# Patient Record
Sex: FEMALE | Race: WHITE | Employment: PART TIME | URBAN - METROPOLITAN AREA
[De-identification: names, ages, dates, MRNs, and addresses within clinical notes are randomized per-mention and may not be internally consistent; named-entity substitution may affect disease eponyms.]

---

## 2017-03-14 ENCOUNTER — OFFICE VISIT (OUTPATIENT)
Dept: ORTHOPEDIC SURGERY | Age: 52
End: 2017-03-14
Payer: COMMERCIAL

## 2017-03-14 VITALS — BODY MASS INDEX: 39.15 KG/M2 | WEIGHT: 235 LBS | HEIGHT: 65 IN

## 2017-03-14 DIAGNOSIS — M54.50 CHRONIC MIDLINE LOW BACK PAIN WITHOUT SCIATICA: Primary | ICD-10-CM

## 2017-03-14 DIAGNOSIS — M51.36 DDD (DEGENERATIVE DISC DISEASE), LUMBAR: ICD-10-CM

## 2017-03-14 DIAGNOSIS — G89.29 CHRONIC MIDLINE LOW BACK PAIN WITHOUT SCIATICA: Primary | ICD-10-CM

## 2017-03-14 PROBLEM — M51.369 DDD (DEGENERATIVE DISC DISEASE), LUMBAR: Status: ACTIVE | Noted: 2017-03-14

## 2017-03-14 PROCEDURE — 99203 OFFICE O/P NEW LOW 30 MIN: CPT | Performed by: ORTHOPAEDIC SURGERY

## 2017-03-14 ASSESSMENT — ENCOUNTER SYMPTOMS: BACK PAIN: 1

## 2017-03-15 RX ORDER — GUAIFENESIN AND PSEUDOEPHEDRINE HCL 1200; 120 MG/1; MG/1
1200 TABLET, EXTENDED RELEASE ORAL 2 TIMES DAILY
COMMUNITY
End: 2019-08-01

## 2017-03-15 RX ORDER — CETIRIZINE HYDROCHLORIDE 10 MG/1
10 TABLET ORAL DAILY
COMMUNITY
End: 2017-12-01 | Stop reason: ALTCHOICE

## 2017-03-15 RX ORDER — ESTRADIOL 0.1 MG/G
2 CREAM VAGINAL
COMMUNITY
End: 2019-08-01

## 2017-03-15 RX ORDER — RIZATRIPTAN BENZOATE 10 MG/1
10 TABLET ORAL
COMMUNITY
End: 2019-08-01

## 2017-03-15 RX ORDER — TOLTERODINE 4 MG/1
4 CAPSULE, EXTENDED RELEASE ORAL DAILY
COMMUNITY
End: 2019-08-01

## 2017-03-15 RX ORDER — BUDESONIDE AND FORMOTEROL FUMARATE DIHYDRATE 160; 4.5 UG/1; UG/1
2 AEROSOL RESPIRATORY (INHALATION) 2 TIMES DAILY
COMMUNITY
End: 2019-10-14

## 2017-03-15 RX ORDER — ALBUTEROL SULFATE 90 UG/1
2 AEROSOL, METERED RESPIRATORY (INHALATION) EVERY 6 HOURS PRN
COMMUNITY

## 2017-03-15 RX ORDER — MONTELUKAST SODIUM 10 MG/1
10 TABLET ORAL NIGHTLY
COMMUNITY
End: 2017-12-01 | Stop reason: ALTCHOICE

## 2017-03-21 ENCOUNTER — OFFICE VISIT (OUTPATIENT)
Dept: ORTHOPEDIC SURGERY | Age: 52
End: 2017-03-21
Payer: COMMERCIAL

## 2017-03-21 DIAGNOSIS — M51.36 DDD (DEGENERATIVE DISC DISEASE), LUMBAR: Primary | ICD-10-CM

## 2017-03-21 DIAGNOSIS — G89.29 CHRONIC MIDLINE LOW BACK PAIN WITHOUT SCIATICA: ICD-10-CM

## 2017-03-21 DIAGNOSIS — M54.50 CHRONIC MIDLINE LOW BACK PAIN WITHOUT SCIATICA: ICD-10-CM

## 2017-03-21 PROCEDURE — 99213 OFFICE O/P EST LOW 20 MIN: CPT | Performed by: ORTHOPAEDIC SURGERY

## 2017-03-23 ENCOUNTER — TELEPHONE (OUTPATIENT)
Dept: ORTHOPEDIC SURGERY | Age: 52
End: 2017-03-23

## 2017-03-29 ENCOUNTER — TELEPHONE (OUTPATIENT)
Dept: ORTHOPEDIC SURGERY | Age: 52
End: 2017-03-29

## 2017-12-01 ENCOUNTER — INITIAL CONSULT (OUTPATIENT)
Dept: ONCOLOGY | Age: 52
End: 2017-12-01
Payer: COMMERCIAL

## 2017-12-01 VITALS
DIASTOLIC BLOOD PRESSURE: 82 MMHG | SYSTOLIC BLOOD PRESSURE: 124 MMHG | TEMPERATURE: 97.8 F | BODY MASS INDEX: 43.16 KG/M2 | HEART RATE: 74 BPM | WEIGHT: 252.8 LBS | RESPIRATION RATE: 16 BRPM | HEIGHT: 64 IN

## 2017-12-01 DIAGNOSIS — D69.1 THROMBOCYTOPATHIA (HCC): Primary | ICD-10-CM

## 2017-12-01 PROCEDURE — 1036F TOBACCO NON-USER: CPT | Performed by: INTERNAL MEDICINE

## 2017-12-01 PROCEDURE — G8417 CALC BMI ABV UP PARAM F/U: HCPCS | Performed by: INTERNAL MEDICINE

## 2017-12-01 PROCEDURE — 3014F SCREEN MAMMO DOC REV: CPT | Performed by: INTERNAL MEDICINE

## 2017-12-01 PROCEDURE — G8484 FLU IMMUNIZE NO ADMIN: HCPCS | Performed by: INTERNAL MEDICINE

## 2017-12-01 PROCEDURE — 3017F COLORECTAL CA SCREEN DOC REV: CPT | Performed by: INTERNAL MEDICINE

## 2017-12-01 PROCEDURE — G8427 DOCREV CUR MEDS BY ELIG CLIN: HCPCS | Performed by: INTERNAL MEDICINE

## 2017-12-01 PROCEDURE — 99201 HC NEW PT, E/M LEVEL 1: CPT

## 2017-12-01 PROCEDURE — 99203 OFFICE O/P NEW LOW 30 MIN: CPT | Performed by: INTERNAL MEDICINE

## 2017-12-01 RX ORDER — ROPINIROLE 0.5 MG/1
0.5 TABLET, FILM COATED ORAL 2 TIMES DAILY
COMMUNITY
Start: 2017-10-26

## 2017-12-01 RX ORDER — METHOCARBAMOL 500 MG/1
500 TABLET, FILM COATED ORAL EVERY 6 HOURS PRN
COMMUNITY
End: 2019-08-01

## 2017-12-01 NOTE — PROGRESS NOTES
CONSULT  NOTE    PCP: Ron Soto DO  Referring Provider: No ref. provider found    IMPRESSION:   1. Reactive thrombocytopenia  2. Common variable immunodeficiency receiving replacement IV gammaglobulin  3. Recurrent urinary tract infection  4. Nonalcoholic steatohepatitis biopsy done at [place] [address] 2-3 years ago  5. Obstructive sleep apnea on home CPAP  6. Obesity  7. Back pain/DJD with spinal stimulator  8. Hypothyroidism  9. Mild asthma      INTERVAL HISTORY:   Recent hospital stay in the [facility] with mild thrombocytopenia just above 100,000. No bleeding or bruising with institution of antibiotic therapy platelet count returned to normal.    In addition she has a diagnosis of nonalcoholic steatohepatitis and biopsy at [place] [address] 2-3 years ago. I do not have report of that biopsy. This condition can also lead to cytopenias however most of her counts have been relatively normal.    She is multiple bony issues with bilateral knee surgery bilateral carpal tunnel bilateral shoulder surgery well as the C5-C6 fusion she also has a spinal cord stimulator. She denies hypertension diabetes heart disease strokes kidney problems. She is a nonsmoker has never smoked she uses alcohol rarely. 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 She has allergies to penicillin Ativan and sulfa drugs as well as clindamycin. She also does not use NSAIDS because of nausea and stomach irritation. Review of laboratory values in the prone letter, electronic health record shows that her count is up and down for the most part it is normal.  The remainder of her CBC including white cell differential hemoglobin are all satisfactory. The likelihood that is that this is a reactive thrombocytopenia and the most recent case due to her urinary tract infection. PLAN:     1.  Since her platelet count recovers I don't think any hematologic workup right now is required  2. Remainder of her CBC is also satisfactory  3. No need for bone marrow exam  4. We will be available but will not schedule any follow-up visits at this point  5. Discussed with patient and answered her questions    Return if symptoms worsen or fail to improve. VISIT DIAGNOSIS:  [facility]     PAST MEDICAL HISTORY:      Diagnosis Date    Acid reflux     Anxiety disorder     Arthritis     Bronchitis     With sinus infections    Depression     Hypothyroidism     Migraine headache     Sleep apnea        PAST SURGICAL HISTORY:      Procedure Laterality Date    APPENDECTOMY  11/99    CARPAL TUNNEL RELEASE Right 12/96    Again with trigger finger 12/98    CHOLECYSTECTOMY  6/95    JOINT REPLACEMENT Right 10/09    Knee    JOINT REPLACEMENT Left 3/13    Knee    KNEE ARTHROSCOPY Right     LIVER BIOPSY  11/13/2015    NECK SURGERY      Repair herniated C5-6    NOSE SURGERY  8/2000    Septoplasty, sinuosotomies, hypertrophy turbinates    SHOULDER SURGERY      Manipulation and arthroscopy 12/10    SPINE SURGERY      TENDON RELEASE Right     thumb    TOTAL KNEE ARTHROPLASTY Right     partial        CURRENT MEDICATIONS:   Current Outpatient Prescriptions   Medication Sig Dispense Refill    rOPINIRole (REQUIP) 0.5 MG tablet Take 0.5 mg by mouth      methocarbamol (ROBAXIN) 500 MG tablet Take 500 mg by mouth every 6 hours as needed      tolterodine (DETROL LA) 4 MG extended release capsule Take 4 mg by mouth daily      budesonide-formoterol (SYMBICORT) 160-4.5 MCG/ACT AERO Inhale 2 puffs into the lungs 2 times daily      albuterol sulfate HFA (PROAIR HFA) 108 (90 BASE) MCG/ACT inhaler Inhale 2 puffs into the lungs every 6 hours as needed for Wheezing      Pseudoephedrine-guaiFENesin (MUCINEX D) 120-1200 MG TB12 Take 1,200 mg by mouth 2 times daily      estradiol (ESTRACE) 0.1 MG/GM vaginal cream Place 2 g vaginally Twice a Week      Immune Globulin,

## 2018-03-27 ENCOUNTER — OFFICE VISIT (OUTPATIENT)
Dept: PODIATRY | Age: 53
End: 2018-03-27
Payer: COMMERCIAL

## 2018-03-27 VITALS — HEIGHT: 65 IN | BODY MASS INDEX: 39.15 KG/M2 | WEIGHT: 235 LBS

## 2018-03-27 DIAGNOSIS — L60.0 INGROWN NAIL OF GREAT TOE OF LEFT FOOT: ICD-10-CM

## 2018-03-27 DIAGNOSIS — M79.605 PAIN OF LEFT LOWER EXTREMITY: ICD-10-CM

## 2018-03-27 DIAGNOSIS — R26.2 DIFFICULTY WALKING: ICD-10-CM

## 2018-03-27 DIAGNOSIS — M20.5X2 HALLUX LIMITUS, LEFT: Primary | ICD-10-CM

## 2018-03-27 DIAGNOSIS — R60.0 EDEMA OF LOWER EXTREMITY: ICD-10-CM

## 2018-03-27 PROBLEM — E03.9 HYPOTHYROIDISM: Status: ACTIVE | Noted: 2017-01-27

## 2018-03-27 PROBLEM — M17.9 OSTEOARTHRITIS OF KNEE: Status: ACTIVE | Noted: 2017-01-27

## 2018-03-27 PROBLEM — M17.11 PRIMARY OSTEOARTHRITIS OF RIGHT KNEE: Status: ACTIVE | Noted: 2017-07-19

## 2018-03-27 PROBLEM — Z98.890 STATUS POST ARTHROSCOPY OF SHOULDER: Status: ACTIVE | Noted: 2018-01-02

## 2018-03-27 PROBLEM — T84.84XA PAIN DUE TO TOTAL RIGHT KNEE REPLACEMENT (HCC): Status: ACTIVE | Noted: 2017-04-13

## 2018-03-27 PROBLEM — E66.01 MORBID OBESITY WITH BMI OF 40.0-44.9, ADULT (HCC): Status: ACTIVE | Noted: 2017-12-04

## 2018-03-27 PROBLEM — Z96.652 HISTORY OF KNEE REPLACEMENT PROCEDURE OF LEFT KNEE: Status: ACTIVE | Noted: 2017-01-10

## 2018-03-27 PROBLEM — T84.84XA PAIN DUE TO KNEE JOINT PROSTHESIS (HCC): Status: ACTIVE | Noted: 2017-04-13

## 2018-03-27 PROBLEM — Z96.659 PAIN DUE TO TOTAL KNEE REPLACEMENT (HCC): Status: ACTIVE | Noted: 2017-05-16

## 2018-03-27 PROBLEM — Z96.659 PAIN DUE TO KNEE JOINT PROSTHESIS (HCC): Status: ACTIVE | Noted: 2017-04-13

## 2018-03-27 PROBLEM — N95.1 MENOPAUSAL SYMPTOM: Status: ACTIVE | Noted: 2017-01-27

## 2018-03-27 PROBLEM — R11.2 PONV (POSTOPERATIVE NAUSEA AND VOMITING): Status: ACTIVE | Noted: 2018-03-27

## 2018-03-27 PROBLEM — G56.00 CARPAL TUNNEL SYNDROME: Status: ACTIVE | Noted: 2017-01-27

## 2018-03-27 PROBLEM — R10.9 FLANK PAIN: Status: ACTIVE | Noted: 2018-02-25

## 2018-03-27 PROBLEM — N92.0 MENORRHAGIA: Status: ACTIVE | Noted: 2017-01-27

## 2018-03-27 PROBLEM — D84.9 IMMUNOLOGIC DEFICIENCY SYNDROME (HCC): Status: ACTIVE | Noted: 2017-01-27

## 2018-03-27 PROBLEM — K76.0 NONALCOHOLIC FATTY LIVER DISEASE: Status: ACTIVE | Noted: 2017-01-27

## 2018-03-27 PROBLEM — J30.9 ATOPIC RHINITIS: Status: ACTIVE | Noted: 2017-01-27

## 2018-03-27 PROBLEM — Z96.651 HISTORY OF KNEE REPLACEMENT PROCEDURE OF RIGHT KNEE: Status: ACTIVE | Noted: 2017-01-10

## 2018-03-27 PROBLEM — E66.01 MORBID OBESITY (HCC): Status: ACTIVE | Noted: 2017-01-27

## 2018-03-27 PROBLEM — E66.9 OBESITY (BMI 30-39.9): Status: ACTIVE | Noted: 2017-01-10

## 2018-03-27 PROBLEM — S80.10XA HEMATOMA OF LEG: Status: ACTIVE | Noted: 2017-08-24

## 2018-03-27 PROBLEM — T88.59XA PROLONGED EMERGENCE FROM GENERAL ANESTHESIA: Status: ACTIVE | Noted: 2018-03-27

## 2018-03-27 PROBLEM — G47.419 NARCOLEPSY: Status: ACTIVE | Noted: 2017-01-27

## 2018-03-27 PROBLEM — K21.9 GASTROESOPHAGEAL REFLUX DISEASE: Status: ACTIVE | Noted: 2017-01-27

## 2018-03-27 PROBLEM — G47.30 SLEEP APNEA: Status: ACTIVE | Noted: 2017-01-27

## 2018-03-27 PROBLEM — F32.A DEPRESSION: Status: ACTIVE | Noted: 2017-05-16

## 2018-03-27 PROBLEM — J32.9 CHRONIC SINUSITIS: Status: ACTIVE | Noted: 2017-01-27

## 2018-03-27 PROBLEM — H81.10 BENIGN PAROXYSMAL POSITIONAL VERTIGO: Status: ACTIVE | Noted: 2017-01-27

## 2018-03-27 PROBLEM — Z16.12 UTI DUE TO EXTENDED-SPECTRUM BETA LACTAMASE (ESBL) PRODUCING ESCHERICHIA COLI: Status: ACTIVE | Noted: 2017-12-18

## 2018-03-27 PROBLEM — B96.29 UTI DUE TO EXTENDED-SPECTRUM BETA LACTAMASE (ESBL) PRODUCING ESCHERICHIA COLI: Status: ACTIVE | Noted: 2017-12-18

## 2018-03-27 PROBLEM — Z87.09 HISTORY OF ASTHMA: Status: ACTIVE | Noted: 2017-01-27

## 2018-03-27 PROBLEM — M94.20 CHONDROMALACIA: Status: ACTIVE | Noted: 2017-01-27

## 2018-03-27 PROBLEM — F41.8 MIXED ANXIETY DEPRESSIVE DISORDER: Status: ACTIVE | Noted: 2017-01-27

## 2018-03-27 PROBLEM — T84.84XA PAIN DUE TO TOTAL KNEE REPLACEMENT (HCC): Status: ACTIVE | Noted: 2017-05-16

## 2018-03-27 PROBLEM — M25.569 KNEE PAIN: Status: ACTIVE | Noted: 2017-04-13

## 2018-03-27 PROBLEM — Z96.651 PAIN DUE TO TOTAL RIGHT KNEE REPLACEMENT (HCC): Status: ACTIVE | Noted: 2017-04-13

## 2018-03-27 PROBLEM — N39.0 UTI DUE TO EXTENDED-SPECTRUM BETA LACTAMASE (ESBL) PRODUCING ESCHERICHIA COLI: Status: ACTIVE | Noted: 2017-12-18

## 2018-03-27 PROBLEM — N39.3 STRESS INCONTINENCE: Status: ACTIVE | Noted: 2017-01-27

## 2018-03-27 PROBLEM — M22.41 CHONDROMALACIA OF RIGHT PATELLOFEMORAL JOINT: Status: ACTIVE | Noted: 2017-02-21

## 2018-03-27 PROBLEM — G25.81 RESTLESS LEG: Status: ACTIVE | Noted: 2017-08-24

## 2018-03-27 PROBLEM — S43.439A SUPERIOR GLENOID LABRUM LESION OF SHOULDER: Status: ACTIVE | Noted: 2017-01-27

## 2018-03-27 PROBLEM — M25.561 RIGHT KNEE PAIN: Status: ACTIVE | Noted: 2017-01-10

## 2018-03-27 PROBLEM — J45.40 MODERATE PERSISTENT ASTHMA: Status: ACTIVE | Noted: 2017-01-27

## 2018-03-27 PROBLEM — D50.9 IRON DEFICIENCY ANEMIA: Status: ACTIVE | Noted: 2017-01-27

## 2018-03-27 PROBLEM — M75.21 BICEPS TENDINITIS ON RIGHT: Status: ACTIVE | Noted: 2018-01-02

## 2018-03-27 PROBLEM — Z98.890 PONV (POSTOPERATIVE NAUSEA AND VOMITING): Status: ACTIVE | Noted: 2018-03-27

## 2018-03-27 PROBLEM — G43.909 MIGRAINE HEADACHE: Status: ACTIVE | Noted: 2017-01-27

## 2018-03-27 PROBLEM — M21.619 BUNION: Status: ACTIVE | Noted: 2017-01-27

## 2018-03-27 PROBLEM — Z98.890 HISTORY OF SUBURETHRAL SLING PROCEDURE: Status: ACTIVE | Noted: 2017-12-18

## 2018-03-27 PROBLEM — M20.40 HAMMER TOE: Status: ACTIVE | Noted: 2017-01-27

## 2018-03-27 PROCEDURE — G8417 CALC BMI ABV UP PARAM F/U: HCPCS | Performed by: PODIATRIST

## 2018-03-27 PROCEDURE — 99203 OFFICE O/P NEW LOW 30 MIN: CPT | Performed by: PODIATRIST

## 2018-03-27 PROCEDURE — 3014F SCREEN MAMMO DOC REV: CPT | Performed by: PODIATRIST

## 2018-03-27 PROCEDURE — 3017F COLORECTAL CA SCREEN DOC REV: CPT | Performed by: PODIATRIST

## 2018-03-27 PROCEDURE — 11750 EXCISION NAIL&NAIL MATRIX: CPT | Performed by: PODIATRIST

## 2018-03-27 PROCEDURE — 73630 X-RAY EXAM OF FOOT: CPT | Performed by: PODIATRIST

## 2018-03-27 PROCEDURE — G8484 FLU IMMUNIZE NO ADMIN: HCPCS | Performed by: PODIATRIST

## 2018-03-27 PROCEDURE — G8427 DOCREV CUR MEDS BY ELIG CLIN: HCPCS | Performed by: PODIATRIST

## 2018-03-27 PROCEDURE — 1036F TOBACCO NON-USER: CPT | Performed by: PODIATRIST

## 2018-03-27 NOTE — PROGRESS NOTES
Maribel Whaley  New Patient History and Physical    Chief Complaint   Patient presents with    Callouses    Ingrown Toenail     bl great toenails    Foot Pain     left foot previous bunion surgery april 2015         HPI: Sahil Arevalo is a 46 y.o. female who presents to the office today complaining of bilateral ingrown toenail of the great toes and excess skin around toes with left great toe being the worst. She admits to left bunion surgery 3 years ago and relates to continued pain ever since. She wears custom orthotics without much relief. Symptoms began 1 year(s) ago. Patient has noticed bump getting bigger and having trouble finding shoes that fit Patient relates pain is Present. Pain is rated 3 out of 10 and is described as intermittent. Treatments prior to today's visit include: none. Currently denies F/C/N/V. Allergies   Allergen Reactions    Synvisc [Hylan G-F 20] Swelling    Amoxicillin     Ativan [Lorazepam]      Makes hyper    Augmentin [Amoxicillin-Pot Clavulanate]     Bactrim [Sulfamethoxazole-Trimethoprim]      Intolerance    Cleocin [Clindamycin Hcl]     Nsaids        Past Medical History:   Diagnosis Date    Acid reflux     Anxiety disorder     Arthritis     Bronchitis     With sinus infections    Depression     Hypothyroidism     Migraine headache     Sleep apnea        Prior to Admission medications    Medication Sig Start Date End Date Taking?  Authorizing Provider   rOPINIRole (REQUIP) 0.5 MG tablet Take 0.5 mg by mouth 10/26/17  Yes Historical Provider, MD   methocarbamol (ROBAXIN) 500 MG tablet Take 500 mg by mouth every 6 hours as needed   Yes Historical Provider, MD   tolterodine (DETROL LA) 4 MG extended release capsule Take 4 mg by mouth daily   Yes Historical Provider, MD   budesonide-formoterol (SYMBICORT) 160-4.5 MCG/ACT AERO Inhale 2 puffs into the lungs 2 times daily   Yes Historical Provider, MD   albuterol sulfate HFA (PROAIR HFA) 108 (90 BASE) SURGERY  8/2000    Septoplasty, sinuosotomies, hypertrophy turbinates    SHOULDER SURGERY      Manipulation and arthroscopy 12/10    SPINE SURGERY      TENDON RELEASE Right     thumb    TOTAL KNEE ARTHROPLASTY Right     partial        History reviewed. No pertinent family history. Social History   Substance Use Topics    Smoking status: Never Smoker    Smokeless tobacco: Never Used    Alcohol use Yes      Comment: Rarely 1-2 a year       Review of Systems    Lower Extremity Physical Examination:     Vitals: There were no vitals filed for this visit. General: AAO x 3 in NAD. Dermatologic Exam:  Skin lesion/ulceration Absent . Skin No rashes or nodules noted. .       Musculoskeletal:     1st MPJ ROM decreased, left. Pain with 1st MPJ ROM, left. Muscle strength 5/5, Bilateral.  Pain present upon palpation of left 1st MPJ. Medial longitudinal arch, Bilateral WNL. Ankle ROM WNL,Bilateral.    Dorsally contracted digits absent digits 1-5 Bilateral.     Vascular: DP and PT pulses palpable 2/4, Bilateral.  CFT <3 seconds, Bilateral.  Hair growth present to the level of the digits, Bilateral.  Edema absent, Bilateral.  Varicosities absent, Bilateral. Erythema absent, Bilateral    Neurological: Sensation intact to light touch to level of digits, Bilateral.  Protective sensation intact 10/10 sites via 5.07/10g [place]-Darryl Monofilament, Bilateral.  negative Tinel's, Bilateral.  negative Valleix sign, Bilateral.      Integument: Warm, dry, supple, Bilateral.  Open lesion absent, Bilateral.  Interdigital maceration absent to web spaces 1-4, Bilateral.  Nails are incurvated with hyperkeratotic tissue along nail borders. Left hallux nail borders are incurvated with evidence of previous nail procedure.   Fissures absent, Bilateral.     Radiographs:  3 views left foot:  Significant joint destruction at left 1st MPJ with signs of minimal cartilage lining along both head of 1st metatarsal and base of proximal regarding date for surgery. Patient will call to schedule surgery.     Electronically signed by Yobani Karimi DPM on 3/27/2018 at 1:16 PM  3/27/2018

## 2018-04-03 ENCOUNTER — OFFICE VISIT (OUTPATIENT)
Dept: PODIATRY | Age: 53
End: 2018-04-03

## 2018-04-03 VITALS — RESPIRATION RATE: 16 BRPM | WEIGHT: 235 LBS | BODY MASS INDEX: 39.15 KG/M2 | HEIGHT: 65 IN

## 2018-04-03 DIAGNOSIS — Z98.890 POST-OPERATIVE STATE: Primary | ICD-10-CM

## 2018-04-03 PROCEDURE — 99024 POSTOP FOLLOW-UP VISIT: CPT | Performed by: PODIATRIST

## 2018-04-24 ENCOUNTER — OFFICE VISIT (OUTPATIENT)
Dept: PODIATRY | Age: 53
End: 2018-04-24

## 2018-04-24 VITALS — BODY MASS INDEX: 39.15 KG/M2 | HEIGHT: 65 IN | WEIGHT: 235 LBS

## 2018-04-24 DIAGNOSIS — Z98.890 POST-OPERATIVE STATE: Primary | ICD-10-CM

## 2018-04-24 PROCEDURE — 99024 POSTOP FOLLOW-UP VISIT: CPT | Performed by: PODIATRIST

## 2018-06-21 ENCOUNTER — OFFICE VISIT (OUTPATIENT)
Dept: PODIATRY | Age: 53
End: 2018-06-21
Payer: COMMERCIAL

## 2018-06-21 VITALS — BODY MASS INDEX: 39.15 KG/M2 | HEIGHT: 65 IN | WEIGHT: 235 LBS

## 2018-06-21 DIAGNOSIS — R26.2 DIFFICULTY WALKING: ICD-10-CM

## 2018-06-21 DIAGNOSIS — M79.604 PAIN OF RIGHT LOWER EXTREMITY: Primary | ICD-10-CM

## 2018-06-21 DIAGNOSIS — G57.91 NEURITIS OF RIGHT FOOT: ICD-10-CM

## 2018-06-21 DIAGNOSIS — M77.41 METATARSALGIA OF RIGHT FOOT: ICD-10-CM

## 2018-06-21 PROCEDURE — G8427 DOCREV CUR MEDS BY ELIG CLIN: HCPCS | Performed by: PODIATRIST

## 2018-06-21 PROCEDURE — 1036F TOBACCO NON-USER: CPT | Performed by: PODIATRIST

## 2018-06-21 PROCEDURE — 3017F COLORECTAL CA SCREEN DOC REV: CPT | Performed by: PODIATRIST

## 2018-06-21 PROCEDURE — G8417 CALC BMI ABV UP PARAM F/U: HCPCS | Performed by: PODIATRIST

## 2018-06-21 PROCEDURE — 99213 OFFICE O/P EST LOW 20 MIN: CPT | Performed by: PODIATRIST

## 2018-06-21 RX ORDER — AZELASTINE HYDROCHLORIDE AND FLUTICASONE PROPIONATE 137; 50 UG/1; UG/1
SPRAY, METERED NASAL
Refills: 3 | COMMUNITY
Start: 2018-04-28 | End: 2019-08-01

## 2018-06-21 RX ORDER — BUSPIRONE HYDROCHLORIDE 15 MG/1
TABLET ORAL
Refills: 2 | COMMUNITY
Start: 2018-06-04

## 2018-06-21 RX ORDER — VORTIOXETINE 20 MG/1
TABLET, FILM COATED ORAL
Refills: 2 | COMMUNITY
Start: 2018-06-05

## 2018-06-21 RX ORDER — SCOPOLAMINE 1 MG/3 DAY
PATCH,TRANSDERMAL 3 DAY TRANSDERMAL
COMMUNITY
Start: 2018-06-09 | End: 2019-08-01

## 2018-06-21 RX ORDER — MIRABEGRON 50 MG/1
TABLET, FILM COATED, EXTENDED RELEASE ORAL
Refills: 1 | COMMUNITY
Start: 2018-03-29 | End: 2018-06-21

## 2018-06-21 RX ORDER — TOLTERODINE 2 MG/1
CAPSULE, EXTENDED RELEASE ORAL
Refills: 0 | COMMUNITY
Start: 2018-04-30 | End: 2018-06-21

## 2018-07-11 ENCOUNTER — OFFICE VISIT (OUTPATIENT)
Dept: PODIATRY | Age: 53
End: 2018-07-11
Payer: COMMERCIAL

## 2018-07-11 VITALS — WEIGHT: 235 LBS | BODY MASS INDEX: 39.15 KG/M2 | HEIGHT: 65 IN

## 2018-07-11 DIAGNOSIS — M72.2 PLANTAR FASCIAL FIBROMATOSIS: Primary | ICD-10-CM

## 2018-07-11 DIAGNOSIS — M79.671 PAIN IN RIGHT FOOT: ICD-10-CM

## 2018-07-11 PROCEDURE — 1036F TOBACCO NON-USER: CPT | Performed by: PODIATRIST

## 2018-07-11 PROCEDURE — G8417 CALC BMI ABV UP PARAM F/U: HCPCS | Performed by: PODIATRIST

## 2018-07-11 PROCEDURE — 3017F COLORECTAL CA SCREEN DOC REV: CPT | Performed by: PODIATRIST

## 2018-07-11 PROCEDURE — 99213 OFFICE O/P EST LOW 20 MIN: CPT | Performed by: PODIATRIST

## 2018-07-11 PROCEDURE — G8427 DOCREV CUR MEDS BY ELIG CLIN: HCPCS | Performed by: PODIATRIST

## 2018-07-11 NOTE — PROGRESS NOTES
LA) 4 MG extended release capsule Take 4 mg by mouth daily   Yes Historical Provider, MD   budesonide-formoterol (SYMBICORT) 160-4.5 MCG/ACT AERO Inhale 2 puffs into the lungs 2 times daily   Yes Historical Provider, MD   albuterol sulfate HFA (PROAIR HFA) 108 (90 BASE) MCG/ACT inhaler Inhale 2 puffs into the lungs every 6 hours as needed for Wheezing   Yes Historical Provider, MD   Pseudoephedrine-guaiFENesin (MUCINEX D) 120-1200 MG TB12 Take 1,200 mg by mouth 2 times daily   Yes Historical Provider, MD   estradiol (ESTRACE) 0.1 MG/GM vaginal cream Place 2 g vaginally Twice a Week   Yes Historical Provider, MD   Immune Globulin, Human, 10 GM/50ML SOLN Inject into the skin   Yes Historical Provider, MD   rizatriptan (MAXALT) 10 MG tablet Take 10 mg by mouth once as needed for Migraine May repeat in 2 hours if needed   Yes Historical Provider, MD   Magnesium 400 MG CAPS Take by mouth   Yes Historical Provider, MD   buPROPion (WELLBUTRIN) 75 MG tablet Take 75 mg by mouth daily 11/16/15  Yes Historical Provider, MD   furosemide (LASIX) 20 MG tablet Take 20 mg by mouth daily as needed 10/8/15  Yes Historical Provider, MD   butalbital-acetaminophen-caffeine (FIORICET, ESGIC) -40 MG per tablet Take 1 tablet by mouth as needed for Headaches  1/6/15  Yes Historical Provider, MD   escitalopram (LEXAPRO) 20 MG tablet Take 20 mg by mouth daily  2/9/15  Yes Historical Provider, MD   lansoprazole (PREVACID) 30 MG capsule Take 30 mg by mouth daily  1/16/15  Yes Historical Provider, MD   levothyroxine (SYNTHROID) 125 MCG tablet Take 125 mcg by mouth daily  2/9/15  Yes Historical Provider, MD   ondansetron (ZOFRAN) 4 MG tablet Take 4 mg by mouth every 8 hours as needed for Nausea  1/28/15  Yes Historical Provider, MD       Review of Systems  Review of Systems - History obtained from chart review and the patient  General ROS: negative for - chills, fatigue, fever, night sweats or weight gain  Constitutional: Negative for chills, diaphoresis, fatigue, fever and unexpected weight change. Musculoskeletal: Positive for arthralgias, gait problem and joint swelling. Neurological ROS: negative for - behavioral changes, confusion, headaches or seizures. Negative for weakness and numbness. Dermatological ROS: negative for - mole changes, rash  Cardiovascular: Negative for leg swelling. Gastrointestinal: Negative for constipation, diarrhea, nausea and vomiting. Lower Extremity Physical Examination:     Vitals: There were no vitals filed for this visit. General: AAO x 3 in NAD. Dermatologic Exam:  Skin lesion/ulceration Absent . Skin No rashes or nodules noted. .       Musculoskeletal:     1st MPJ ROM decreased, Bilateral.  Muscle strength 5/5, Bilateral.  POP of the right and left plantar medial calcaneal tuberosity. Pain increased with Dorsiflexion of the right and left lesser toes. Negative pain on compression of the calcaneus bilaterally  . Medial longitudinal arch, Bilateral WNL. Ankle ROM WNL,Bilateral.    Dorsally contracted digits absent digits 1-5 Bilateral.     Vascular: DP and PT pulses palpable 2/4, Bilateral.  CFT <3 seconds, Bilateral.  Hair growth present to the level of the digits, Bilateral.  Edema absent, Bilateral.  Varicosities absent, Bilateral. Erythema absent, Bilateral    Neurological: Sensation intact to light touch to level of digits, Bilateral.  Protective sensation intact 10/10 sites via 5.07/10g [place]-Darryl Monofilament, Bilateral.  negative Tinel's, Bilateral.  negative Valleix sign, Bilateral.      Integument: Warm, dry, supple, Bilateral.  Open lesion absent, Bilateral.  Interdigital maceration absent to web spaces 1-4, Bilateral.  Nails are normal in length, thickness and color 1-5 bilateral.  Fissures absent, Bilateral.       Asessment: Patient is a 48 y.o. female with:    Diagnosis Orders   1. Plantar fascial fibromatosis     2.  Pain in right foot         Plan: Patient examined and evaluated. Current condition and treatment options discussed in detail. Advised pt to her conditon. Pt to use OTC orthotics. She is ok with trying these as she has not yet. Patient Instructions: Plantar Fasciitis    Plantar Fasciitis is inflammation of the long fibrous band on the bottom of the foot (plantar fascia), especially in the area of its attachment to the heel bone (calcaneus). The plantar fascia is also intimately related to the Achilles tendon and therefore stretching of the calf muscles is also important for treatment. 1. Stretching: Perform 3-4 times daily, 2-3 minutes per side      -Before getting out of bed, place a towel around the ball of your foot and       pull back, holding for 30 seconds. Repeat with other foot.      -Place a tennis ball on the floor. Roll the tennis ball under your foot for      10-15 minutes. Repeat with other foot. -Perform calf stretches: stand facing a wall with your hands on the wall,      place one leg a step behind the other. Keeping your back heel on the      floor, bend your front knee (lean into the wall), holding for 30 seconds. Repeat with other leg. 2. Icing: Perform 2-3 times daily      -Place a 12 oz plastic water bottle in the freezer. Once frozen, remove     and roll the bottle under your foot for 10-15 min. 3. Anti-inflammatory Medication      -Begin daily regimen of anti-inflammatory medication (Ibuprofen,       Naproxen, Naprosyn) as discussed during your visit. Verbal and written instructions given to patient. Contact office with any questions/problems/concerns. No orders of the defined types were placed in this encounter. No orders of the defined types were placed in this encounter. RTC in 4week(s).     7/11/2018      Electronically signed by Abran Kayser, DPM on 7/11/2018 at 2:10 PM  7/11/2018

## 2018-09-05 ENCOUNTER — OFFICE VISIT (OUTPATIENT)
Dept: PODIATRY | Age: 53
End: 2018-09-05
Payer: COMMERCIAL

## 2018-09-05 VITALS — BODY MASS INDEX: 39.15 KG/M2 | HEIGHT: 65 IN | WEIGHT: 235 LBS

## 2018-09-05 DIAGNOSIS — S92.502A CLOSED FRACTURE OF PHALANX OF LEFT SECOND TOE, INITIAL ENCOUNTER: Primary | ICD-10-CM

## 2018-09-05 DIAGNOSIS — M79.672 PAIN IN LEFT FOOT: ICD-10-CM

## 2018-09-05 PROBLEM — M54.17 LUMBOSACRAL RADICULITIS: Status: ACTIVE | Noted: 2017-04-13

## 2018-09-05 PROBLEM — D64.9 ANEMIA: Status: ACTIVE | Noted: 2017-04-13

## 2018-09-05 PROBLEM — F41.9 ANXIETY: Status: ACTIVE | Noted: 2017-04-13

## 2018-09-05 PROBLEM — R10.2 PAIN IN PELVIS: Status: ACTIVE | Noted: 2017-09-20

## 2018-09-05 PROBLEM — M46.1 INFLAMMATION OF SACROILIAC JOINT (HCC): Status: ACTIVE | Noted: 2017-04-13

## 2018-09-05 PROBLEM — D69.6 THROMBOCYTOPENIC DISORDER (HCC): Status: ACTIVE | Noted: 2017-04-13

## 2018-09-05 PROBLEM — D80.1 COMMON VARIABLE AGAMMAGLOBULINEMIA (HCC): Status: ACTIVE | Noted: 2017-09-20

## 2018-09-05 PROBLEM — N39.0 RECURRENT URINARY TRACT INFECTION: Status: ACTIVE | Noted: 2017-08-17

## 2018-09-05 PROBLEM — J40 BRONCHITIS: Status: ACTIVE | Noted: 2017-09-20

## 2018-09-05 PROBLEM — G89.4 CHRONIC PAIN DISORDER: Status: ACTIVE | Noted: 2017-04-13

## 2018-09-05 PROCEDURE — 99213 OFFICE O/P EST LOW 20 MIN: CPT | Performed by: PODIATRIST

## 2018-09-05 PROCEDURE — G8427 DOCREV CUR MEDS BY ELIG CLIN: HCPCS | Performed by: PODIATRIST

## 2018-09-05 PROCEDURE — 1036F TOBACCO NON-USER: CPT | Performed by: PODIATRIST

## 2018-09-05 PROCEDURE — 3017F COLORECTAL CA SCREEN DOC REV: CPT | Performed by: PODIATRIST

## 2018-09-05 PROCEDURE — G8417 CALC BMI ABV UP PARAM F/U: HCPCS | Performed by: PODIATRIST

## 2018-09-05 NOTE — PROGRESS NOTES
(REQUIP) 0.5 MG tablet Take 0.5 mg by mouth 10/26/17  Yes Historical Provider, MD   methocarbamol (ROBAXIN) 500 MG tablet Take 500 mg by mouth every 6 hours as needed   Yes Historical Provider, MD   tolterodine (DETROL LA) 4 MG extended release capsule Take 4 mg by mouth daily   Yes Historical Provider, MD   budesonide-formoterol (SYMBICORT) 160-4.5 MCG/ACT AERO Inhale 2 puffs into the lungs 2 times daily   Yes Historical Provider, MD   albuterol sulfate HFA (PROAIR HFA) 108 (90 BASE) MCG/ACT inhaler Inhale 2 puffs into the lungs every 6 hours as needed for Wheezing   Yes Historical Provider, MD   Pseudoephedrine-guaiFENesin (MUCINEX D) 120-1200 MG TB12 Take 1,200 mg by mouth 2 times daily   Yes Historical Provider, MD   estradiol (ESTRACE) 0.1 MG/GM vaginal cream Place 2 g vaginally Twice a Week   Yes Historical Provider, MD   Immune Globulin, Human, 10 GM/50ML SOLN Inject into the skin   Yes Historical Provider, MD   rizatriptan (MAXALT) 10 MG tablet Take 10 mg by mouth once as needed for Migraine May repeat in 2 hours if needed   Yes Historical Provider, MD   Magnesium 400 MG CAPS Take by mouth   Yes Historical Provider, MD   buPROPion (WELLBUTRIN) 75 MG tablet Take 75 mg by mouth daily 11/16/15  Yes Historical Provider, MD   furosemide (LASIX) 20 MG tablet Take 20 mg by mouth daily as needed 10/8/15  Yes Historical Provider, MD   butalbital-acetaminophen-caffeine (FIORICET, ESGIC) -40 MG per tablet Take 1 tablet by mouth as needed for Headaches  1/6/15  Yes Historical Provider, MD   escitalopram (LEXAPRO) 20 MG tablet Take 20 mg by mouth daily  2/9/15  Yes Historical Provider, MD   lansoprazole (PREVACID) 30 MG capsule Take 30 mg by mouth daily  1/16/15  Yes Historical Provider, MD   levothyroxine (SYNTHROID) 125 MCG tablet Take 125 mcg by mouth daily  2/9/15  Yes Historical Provider, MD   ondansetron (ZOFRAN) 4 MG tablet Take 4 mg by mouth every 8 hours as needed for Nausea  1/28/15  Yes Historical

## 2018-10-03 ENCOUNTER — OFFICE VISIT (OUTPATIENT)
Dept: PODIATRY | Age: 53
End: 2018-10-03
Payer: COMMERCIAL

## 2018-10-03 VITALS — HEIGHT: 65 IN | WEIGHT: 235 LBS | BODY MASS INDEX: 39.15 KG/M2

## 2018-10-03 DIAGNOSIS — M21.619 BUNION: ICD-10-CM

## 2018-10-03 DIAGNOSIS — M79.672 PAIN IN LEFT FOOT: Primary | ICD-10-CM

## 2018-10-03 PROCEDURE — G8484 FLU IMMUNIZE NO ADMIN: HCPCS | Performed by: PODIATRIST

## 2018-10-03 PROCEDURE — G8417 CALC BMI ABV UP PARAM F/U: HCPCS | Performed by: PODIATRIST

## 2018-10-03 PROCEDURE — 3017F COLORECTAL CA SCREEN DOC REV: CPT | Performed by: PODIATRIST

## 2018-10-03 PROCEDURE — 1036F TOBACCO NON-USER: CPT | Performed by: PODIATRIST

## 2018-10-03 PROCEDURE — 99213 OFFICE O/P EST LOW 20 MIN: CPT | Performed by: PODIATRIST

## 2018-10-03 PROCEDURE — G8427 DOCREV CUR MEDS BY ELIG CLIN: HCPCS | Performed by: PODIATRIST

## 2018-10-03 NOTE — PROGRESS NOTES
[address] [facility] [address] [place] [address] Drive  [address] [place] [address]  Dept: [phone]    RETURN PATIENT PROGRESS NOTE  Date of patient's visit: 10/3/2018  Patient's Name:  Jen Canchola YOB: 1965            Patient Care Team:  Sasha Alvarez DO as PCP - General (Family Medicine)  Issa Ziegler DPM as Consulting Physician (Podiatry)       Jen Canchola 48 y.o. female that presents for follow-up of   Chief Complaint   Patient presents with    Toe Pain     2nd left    Toe Injury     2nd left       Symptoms began 5 week(s) ago and are increased . Patient relates pain is Present. Pain is rated 0 out of 10 and is described as none. Treatments prior to today's visit include: none. Currently denies F/C/N/V. Stats she has been using her surgcial shoe and has no pain  Allergies   Allergen Reactions    Nsaids Other (See Comments)     Ulcers    Synvisc [Hylan G-F 20] Swelling and Other (See Comments)     Edema    Alprazolam Other (See Comments)     hyperactivity    Amoxicillin      Other reaction(s): GI Disturbance    Ativan [Lorazepam] Other (See Comments)     \"jittery\"  Makes hyper    Augmentin [Amoxicillin-Pot Clavulanate] Nausea Only    Bactrim [Sulfamethoxazole-Trimethoprim]      Intolerance    Cleocin [Clindamycin Hcl] Photosensitivity    Erythromycin Nausea Only       Past Medical History:   Diagnosis Date    Acid reflux     Anxiety disorder     Arthritis     Bronchitis     With sinus infections    Depression     Hypothyroidism     Migraine headache     Sleep apnea        Prior to Admission medications    Medication Sig Start Date End Date Taking?  Authorizing Provider   TRINTELLIX 20 MG TABS tablet TAKE 1 TABLET BY MOUTH ONE TIME A DAY 6/5/18  Yes Historical Provider, MD   TRANSDERM-SCOP, 1.5 MG, TRANSDERMAL PATCH  6/9/18  Yes Historical Provider, MD   busPIRone (BUSPAR) 15 MG tablet TAKE 1 TABLET BY MOUTH TWO TIMES A DAY 6/4/18  Yes Historical Provider, MD   DYMISTA 137-50 MCG/ACT SUSP INSTILL 1 SPRAY IN EACH NOSTRIL TWICE DAILY 4/28/18  Yes Historical Provider, MD   rOPINIRole (REQUIP) 0.5 MG tablet Take 0.5 mg by mouth 10/26/17  Yes Historical Provider, MD   methocarbamol (ROBAXIN) 500 MG tablet Take 500 mg by mouth every 6 hours as needed   Yes Historical Provider, MD   tolterodine (DETROL LA) 4 MG extended release capsule Take 4 mg by mouth daily   Yes Historical Provider, MD   budesonide-formoterol (SYMBICORT) 160-4.5 MCG/ACT AERO Inhale 2 puffs into the lungs 2 times daily   Yes Historical Provider, MD   albuterol sulfate HFA (PROAIR HFA) 108 (90 BASE) MCG/ACT inhaler Inhale 2 puffs into the lungs every 6 hours as needed for Wheezing   Yes Historical Provider, MD   Pseudoephedrine-guaiFENesin (MUCINEX D) 120-1200 MG TB12 Take 1,200 mg by mouth 2 times daily   Yes Historical Provider, MD   estradiol (ESTRACE) 0.1 MG/GM vaginal cream Place 2 g vaginally Twice a Week   Yes Historical Provider, MD   Immune Globulin, Human, 10 GM/50ML SOLN Inject into the skin   Yes Historical Provider, MD   rizatriptan (MAXALT) 10 MG tablet Take 10 mg by mouth once as needed for Migraine May repeat in 2 hours if needed   Yes Historical Provider, MD   Magnesium 400 MG CAPS Take by mouth   Yes Historical Provider, MD   buPROPion (WELLBUTRIN) 75 MG tablet Take 75 mg by mouth daily 11/16/15  Yes Historical Provider, MD   furosemide (LASIX) 20 MG tablet Take 20 mg by mouth daily as needed 10/8/15  Yes Historical Provider, MD   butalbital-acetaminophen-caffeine (FIORICET, ESGIC) -40 MG per tablet Take 1 tablet by mouth as needed for Headaches  1/6/15  Yes Historical Provider, MD   escitalopram (LEXAPRO) 20 MG tablet Take 20 mg by mouth daily  2/9/15  Yes Historical Provider, MD   lansoprazole (PREVACID) 30 MG capsule Take 30 mg by mouth daily  1/16/15  Yes Historical Provider, MD   levothyroxine (SYNTHROID) 125 MCG tablet Take 125 mcg by mouth daily  2/9/15 spaces 1-4, Bilateral.  Nails are normal in length, thickness and color 1-5 bilateral.  Fissures absent, Bilateral.     Left foot x rays:  Fracture extraarticular to the left 2nd proximal phalanx with minimal displacment. Soft tissue edema noted. Asessment: Patient is a 48 y.o. female with:    Diagnosis Orders   1. Pain in left foot  XR FOOT LEFT (MIN 3 VIEWS)   2. Bunion  XR FOOT LEFT (MIN 3 VIEWS)       Plan: Patient examined and evaluated. Current condition and treatment options discussed in detail. Advised pt to her condtion. Verbal and written instructions given to patient. Contact office with any questions/problems/concerns. X-rays 3 views left foot    No orders of the defined types were placed in this encounter. No orders of the defined types were placed in this encounter.        RTC PRN  10/3/2018      Electronically signed by Kriss Paris DPM on 10/3/2018 at 10:18 AM  10/3/2018

## 2018-12-03 ENCOUNTER — OFFICE VISIT (OUTPATIENT)
Dept: PODIATRY | Age: 53
End: 2018-12-03
Payer: COMMERCIAL

## 2018-12-03 VITALS — HEIGHT: 65 IN | BODY MASS INDEX: 39.99 KG/M2 | WEIGHT: 240 LBS

## 2018-12-03 DIAGNOSIS — M79.672 LEFT FOOT PAIN: Primary | ICD-10-CM

## 2018-12-03 PROCEDURE — 3017F COLORECTAL CA SCREEN DOC REV: CPT | Performed by: PODIATRIST

## 2018-12-03 PROCEDURE — G8427 DOCREV CUR MEDS BY ELIG CLIN: HCPCS | Performed by: PODIATRIST

## 2018-12-03 PROCEDURE — G8417 CALC BMI ABV UP PARAM F/U: HCPCS | Performed by: PODIATRIST

## 2018-12-03 PROCEDURE — 99213 OFFICE O/P EST LOW 20 MIN: CPT | Performed by: PODIATRIST

## 2018-12-03 PROCEDURE — G8484 FLU IMMUNIZE NO ADMIN: HCPCS | Performed by: PODIATRIST

## 2018-12-03 PROCEDURE — 1036F TOBACCO NON-USER: CPT | Performed by: PODIATRIST

## 2018-12-03 NOTE — PROGRESS NOTES
Yes Historical Provider, MD   ondansetron (ZOFRAN) 4 MG tablet Take 4 mg by mouth every 8 hours as needed for Nausea  1/28/15  Yes Historical Provider, MD       Review of Systems    Review of Systems:  History obtained from chart review and the patient  General ROS: negative for - chills, fatigue, fever, night sweats or weight gain  Constitutional: Negative for chills, diaphoresis, fatigue, fever and unexpected weight change. Musculoskeletal: Positive for arthralgias, gait problem and joint swelling. Neurological ROS: negative for - behavioral changes, confusion, headaches or seizures. Negative for weakness and numbness. Dermatological ROS: negative for - mole changes, rash  Cardiovascular: Negative for leg swelling. Gastrointestinal: Negative for constipation, diarrhea, nausea and vomiting. Lower Extremity Physical Examination:     Vitals: There were no vitals filed for this visit. General: AAO x 3 in NAD. Dermatologic Exam:  Skin lesion/ulceration Absent . Skin No rashes or nodules noted. .       Musculoskeletal:     1st MPJ ROM decreased, Bilateral.  Muscle strength 5/5, Bilateral.  Pain present upon palpation of left 2nd toe at the MTP and the PIPJ with edema and ecchymosis RESOLVED. .  Medial longitudinal arch, Bilateral WNL.   Ankle ROM WNL,Bilateral.    Dorsally contracted digits absent digits 1-5 Bilateral.     Vascular: DP and PT pulses palpable 2/4, Bilateral.  CFT <3 seconds, Bilateral.  Hair growth present to the level of the digits, Bilateral.  Edema absent, Bilateral.  Varicosities absent, Bilateral. Erythema absent, Bilateral    Neurological: Sensation intact to light touch to level of digits, Bilateral.  Protective sensation intact 10/10 sites via 5.07/10g [place]-Darryl Monofilament, Bilateral.  negative Tinel's, Bilateral.  negative Valleix sign, Bilateral.      Integument: Warm, dry, supple, Bilateral.  Open lesion absent, Bilateral.  Interdigital maceration absent to web be purchased with cash, check or charge at the . 8. Physical Therapy- will be prescribed as needed. .  Verbal and written instructions given to patient. Contact office with any questions/problems/concerns. No orders of the defined types were placed in this encounter. No orders of the defined types were placed in this encounter. RTC in 6week(s).     12/3/2018      Electronically signed by Mary Jonse DPM on 12/3/2018 at 3:14 PM  12/3/2018

## 2018-12-19 ENCOUNTER — OFFICE VISIT (OUTPATIENT)
Dept: PODIATRY | Age: 53
End: 2018-12-19
Payer: COMMERCIAL

## 2018-12-19 VITALS — WEIGHT: 240 LBS | HEIGHT: 65 IN | BODY MASS INDEX: 39.99 KG/M2

## 2018-12-19 DIAGNOSIS — M79.672 LEFT FOOT PAIN: Primary | ICD-10-CM

## 2018-12-19 DIAGNOSIS — M76.821 POSTERIOR TIBIAL TENDONITIS OF RIGHT LEG: ICD-10-CM

## 2018-12-19 PROBLEM — Z12.31 SCREENING MAMMOGRAM, ENCOUNTER FOR: Status: ACTIVE | Noted: 2018-12-10

## 2018-12-19 PROBLEM — Z78.0 MENOPAUSE PRESENT: Status: ACTIVE | Noted: 2018-12-10

## 2018-12-19 PROCEDURE — 20550 NJX 1 TENDON SHEATH/LIGAMENT: CPT | Performed by: PODIATRIST

## 2018-12-19 PROCEDURE — G8417 CALC BMI ABV UP PARAM F/U: HCPCS | Performed by: PODIATRIST

## 2018-12-19 PROCEDURE — 99213 OFFICE O/P EST LOW 20 MIN: CPT | Performed by: PODIATRIST

## 2018-12-19 PROCEDURE — 3017F COLORECTAL CA SCREEN DOC REV: CPT | Performed by: PODIATRIST

## 2018-12-19 PROCEDURE — G8427 DOCREV CUR MEDS BY ELIG CLIN: HCPCS | Performed by: PODIATRIST

## 2018-12-19 PROCEDURE — G8484 FLU IMMUNIZE NO ADMIN: HCPCS | Performed by: PODIATRIST

## 2018-12-19 PROCEDURE — 1036F TOBACCO NON-USER: CPT | Performed by: PODIATRIST

## 2018-12-19 RX ORDER — BETAMETHASONE SODIUM PHOSPHATE AND BETAMETHASONE ACETATE 3; 3 MG/ML; MG/ML
3 INJECTION, SUSPENSION INTRA-ARTICULAR; INTRALESIONAL; INTRAMUSCULAR; SOFT TISSUE ONCE
Status: SHIPPED | OUTPATIENT
Start: 2018-12-19

## 2018-12-19 NOTE — PROGRESS NOTES
Provider, MD   busPIRone (BUSPAR) 15 MG tablet TAKE 1 TABLET BY MOUTH TWO TIMES A DAY 6/4/18  Yes Historical Provider, MD   DYMISTA 137-50 MCG/ACT SUSP INSTILL 1 SPRAY IN EACH NOSTRIL TWICE DAILY 4/28/18  Yes Historical Provider, MD   rOPINIRole (REQUIP) 0.5 MG tablet Take 0.5 mg by mouth 10/26/17  Yes Historical Provider, MD   methocarbamol (ROBAXIN) 500 MG tablet Take 500 mg by mouth every 6 hours as needed   Yes Historical Provider, MD   tolterodine (DETROL LA) 4 MG extended release capsule Take 4 mg by mouth daily   Yes Historical Provider, MD   budesonide-formoterol (SYMBICORT) 160-4.5 MCG/ACT AERO Inhale 2 puffs into the lungs 2 times daily   Yes Historical Provider, MD   albuterol sulfate HFA (PROAIR HFA) 108 (90 BASE) MCG/ACT inhaler Inhale 2 puffs into the lungs every 6 hours as needed for Wheezing   Yes Historical Provider, MD   Pseudoephedrine-guaiFENesin (MUCINEX D) 120-1200 MG TB12 Take 1,200 mg by mouth 2 times daily   Yes Historical Provider, MD   estradiol (ESTRACE) 0.1 MG/GM vaginal cream Place 2 g vaginally Twice a Week   Yes Historical Provider, MD   Immune Globulin, Human, 10 GM/50ML SOLN Inject into the skin   Yes Historical Provider, MD   rizatriptan (MAXALT) 10 MG tablet Take 10 mg by mouth once as needed for Migraine May repeat in 2 hours if needed   Yes Historical Provider, MD   Magnesium 400 MG CAPS Take by mouth   Yes Historical Provider, MD   buPROPion (WELLBUTRIN) 75 MG tablet Take 75 mg by mouth daily 11/16/15  Yes Historical Provider, MD   furosemide (LASIX) 20 MG tablet Take 20 mg by mouth daily as needed 10/8/15  Yes Historical Provider, MD   butalbital-acetaminophen-caffeine (FIORICET, ESGIC) -40 MG per tablet Take 1 tablet by mouth as needed for Headaches  1/6/15  Yes Historical Provider, MD   escitalopram (LEXAPRO) 20 MG tablet Take 20 mg by mouth daily  2/9/15  Yes Historical Provider, MD   lansoprazole (PREVACID) 30 MG capsule Take 30 mg by mouth daily  1/16/15  Yes

## 2019-01-18 PROBLEM — Z12.31 SCREENING MAMMOGRAM, ENCOUNTER FOR: Status: RESOLVED | Noted: 2018-12-10 | Resolved: 2019-01-18

## 2019-03-25 ENCOUNTER — OFFICE VISIT (OUTPATIENT)
Dept: PODIATRY | Age: 54
End: 2019-03-25
Payer: COMMERCIAL

## 2019-03-25 VITALS — BODY MASS INDEX: 41.48 KG/M2 | HEIGHT: 65 IN | WEIGHT: 249 LBS

## 2019-03-25 DIAGNOSIS — S92.515A CLOSED NONDISPLACED FRACTURE OF PROXIMAL PHALANX OF LESSER TOE OF LEFT FOOT, INITIAL ENCOUNTER: ICD-10-CM

## 2019-03-25 DIAGNOSIS — M79.672 LEFT FOOT PAIN: Primary | ICD-10-CM

## 2019-03-25 PROBLEM — R50.9 FEVER: Status: ACTIVE | Noted: 2019-01-17

## 2019-03-25 PROBLEM — R05.9 COUGH: Status: ACTIVE | Noted: 2019-01-17

## 2019-03-25 PROCEDURE — G8484 FLU IMMUNIZE NO ADMIN: HCPCS | Performed by: PODIATRIST

## 2019-03-25 PROCEDURE — 1036F TOBACCO NON-USER: CPT | Performed by: PODIATRIST

## 2019-03-25 PROCEDURE — G8417 CALC BMI ABV UP PARAM F/U: HCPCS | Performed by: PODIATRIST

## 2019-03-25 PROCEDURE — G8427 DOCREV CUR MEDS BY ELIG CLIN: HCPCS | Performed by: PODIATRIST

## 2019-03-25 PROCEDURE — 3017F COLORECTAL CA SCREEN DOC REV: CPT | Performed by: PODIATRIST

## 2019-03-25 PROCEDURE — 28510 TREATMENT OF TOE FRACTURE: CPT | Performed by: PODIATRIST

## 2019-03-25 PROCEDURE — 99213 OFFICE O/P EST LOW 20 MIN: CPT | Performed by: PODIATRIST

## 2019-03-25 RX ORDER — PANTOPRAZOLE SODIUM 40 MG/1
40 TABLET, DELAYED RELEASE ORAL
COMMUNITY
Start: 2019-02-14

## 2019-03-25 RX ORDER — SUCRALFATE 1 G/1
1 TABLET ORAL
COMMUNITY
Start: 2019-03-06 | End: 2019-08-01

## 2019-03-25 RX ORDER — LORATADINE 10 MG/1
10 TABLET ORAL
COMMUNITY
Start: 2018-10-05 | End: 2019-08-01

## 2019-03-25 RX ORDER — LAMOTRIGINE 100 MG/1
100 TABLET ORAL NIGHTLY
COMMUNITY
End: 2019-10-23 | Stop reason: SDUPTHER

## 2019-03-25 RX ORDER — CETIRIZINE HYDROCHLORIDE 10 MG/1
10 TABLET ORAL
COMMUNITY
Start: 2017-01-31 | End: 2019-08-01

## 2019-03-25 RX ORDER — RANITIDINE 150 MG/1
150 TABLET ORAL
COMMUNITY
Start: 2019-03-21 | End: 2019-08-01

## 2019-03-25 RX ORDER — LANSOPRAZOLE 30 MG/1
30 CAPSULE, DELAYED RELEASE ORAL
COMMUNITY
Start: 2017-01-31 | End: 2019-08-01

## 2019-03-25 RX ORDER — GLUCOSAMINE/CHONDR SU A SOD 750-600 MG
TABLET ORAL
COMMUNITY

## 2019-03-25 RX ORDER — GRANULES FOR ORAL 3 G/1
POWDER ORAL
COMMUNITY
End: 2019-08-01

## 2019-03-25 RX ORDER — LORAZEPAM 0.5 MG/1
TABLET ORAL
COMMUNITY
Start: 2019-02-06

## 2019-03-25 RX ORDER — IBUPROFEN 800 MG/1
TABLET ORAL
COMMUNITY
Start: 2019-01-18 | End: 2019-10-14

## 2019-04-22 ENCOUNTER — OFFICE VISIT (OUTPATIENT)
Dept: PODIATRY | Age: 54
End: 2019-04-22

## 2019-04-22 VITALS — BODY MASS INDEX: 39.99 KG/M2 | HEIGHT: 65 IN | WEIGHT: 240 LBS

## 2019-04-22 DIAGNOSIS — S92.515A CLOSED NONDISPLACED FRACTURE OF PROXIMAL PHALANX OF LESSER TOE OF LEFT FOOT, INITIAL ENCOUNTER: ICD-10-CM

## 2019-04-22 DIAGNOSIS — M79.672 LEFT FOOT PAIN: Primary | ICD-10-CM

## 2019-04-22 PROCEDURE — 99024 POSTOP FOLLOW-UP VISIT: CPT | Performed by: PODIATRIST

## 2019-04-22 NOTE — PROGRESS NOTES
[address] [facility] [address] [facility]  [place] [address]  Dept: [phone]    RETURN PATIENT PROGRESS NOTE  Date of patient's visit: 4/22/2019  Patient's Name:  Dhaval Nguyen YOB: 1965            Patient Care Team:  Eric Serrano DO as PCP - General (Family Medicine)  Guerita Mena DPM as Consulting Physician (Podiatry)       Dhaval Nguyen 48 y.o. female that presents for follow-up of   Chief Complaint   Patient presents with    Toe Pain     2nd toe left foot    Foot Pain     Pt's primary care physician is Eric Serrano DO last seen march 27 2019  Symptoms began 4 week(s) ago and are decreased . Patient relates pain is Present. Pain is rated 3 out of 10 and is described as constant. Treatments prior to today's visit include: previous podiatry treatment/catherine splint. Currently denies F/C/N/V. Allergies   Allergen Reactions    Hylan G-F 20 Swelling and Other (See Comments)     Edema  Simvisc    Nsaids Other (See Comments)     Ulcers  Other reaction(s): GI Upset  Gastritis and ulcers  Ulcers      Alprazolam Other (See Comments)     hyperactivity  Other reaction(s): Intolerance  hyperactivity    Amoxicillin      Other reaction(s): GI Disturbance    Ativan [Lorazepam] Other (See Comments)     \"jittery\"  Makes hyper    Augmentin [Amoxicillin-Pot Clavulanate] Nausea Only    Bactrim [Sulfamethoxazole-Trimethoprim]      Intolerance    Cleocin [Clindamycin Hcl] Photosensitivity    Erythromycin Nausea Only       Past Medical History:   Diagnosis Date    Acid reflux     Anxiety disorder     Arthritis     Bronchitis     With sinus infections    Depression     Hypothyroidism     Migraine headache     Sleep apnea        Prior to Admission medications    Medication Sig Start Date End Date Taking?  Authorizing Provider   lansoprazole (PREVACID) 30 MG delayed release capsule Take 30 mg by mouth 1/31/17  Yes Historical Provider, MD sucralfate (CARAFATE) 1 GM tablet Take 1 g by mouth 3/6/19  Yes Historical Provider, MD   ranitidine (ZANTAC) 150 MG tablet Take 150 mg by mouth 3/21/19  Yes Historical Provider, MD   lamoTRIgine (LAMICTAL) 100 MG tablet Take 100 mg by mouth   Yes Historical Provider, MD   pantoprazole (PROTONIX) 40 MG tablet Take 40 mg by mouth 2/14/19  Yes Historical Provider, MD   Lutein-Zeaxanthin 25-5 MG CAPS lutein-zeaxanthin 25 mg-5 mg capsule   Take 1 capsule every day by oral route. Yes Historical Provider, MD   LORazepam (ATIVAN) 0.5 MG tablet lorazepam 0.5 mg tablet   Take 1 tablet twice a day by oral route as needed for 30 days.  2/6/19  Yes Historical Provider, MD   loratadine (CLARITIN) 10 MG tablet Take 10 mg by mouth 10/5/18  Yes Historical Provider, MD   ibuprofen (ADVIL;MOTRIN) 800 MG tablet ibuprofen 800 mg tablet 1/18/19  Yes Historical Provider, MD   fosfomycin tromethamine (MONUROL) 3 g PACK Monurol 3 gram oral packet   Yes Historical Provider, MD   fluticasone (VERAMYST) 27.5 MCG/SPRAY nasal spray 2 sprays by NOT APPLICABLE route 3/22/27  Yes Historical Provider, MD   cetirizine (ZYRTEC ALLERGY) 10 MG tablet Take 10 mg by mouth 1/31/17  Yes Historical Provider, MD   TRINTELLIX 20 MG TABS tablet TAKE 1 TABLET BY MOUTH ONE TIME A DAY 6/5/18  Yes Historical Provider, MD   busPIRone (BUSPAR) 15 MG tablet TAKE 1 TABLET BY MOUTH TWO TIMES A DAY 6/4/18  Yes Historical Provider, MD   DYMISTA 137-50 MCG/ACT SUSP INSTILL 1 SPRAY IN EACH NOSTRIL TWICE DAILY 4/28/18  Yes Historical Provider, MD   rOPINIRole (REQUIP) 0.5 MG tablet Take 0.5 mg by mouth 10/26/17  Yes Historical Provider, MD   methocarbamol (ROBAXIN) 500 MG tablet Take 500 mg by mouth every 6 hours as needed   Yes Historical Provider, MD   budesonide-formoterol (SYMBICORT) 160-4.5 MCG/ACT AERO Inhale 2 puffs into the lungs 2 times daily   Yes Historical Provider, MD   albuterol sulfate HFA (PROAIR HFA) 108 (90 BASE) MCG/ACT inhaler Inhale 2 puffs into the lungs every 6 hours as needed for Wheezing   Yes Historical Provider, MD   Pseudoephedrine-guaiFENesin (MUCINEX D) 120-1200 MG TB12 Take 1,200 mg by mouth 2 times daily   Yes Historical Provider, MD   estradiol (ESTRACE) 0.1 MG/GM vaginal cream Place 2 g vaginally Twice a Week   Yes Historical Provider, MD   Immune Globulin, Human, 10 GM/50ML SOLN Inject into the skin   Yes Historical Provider, MD   rizatriptan (MAXALT) 10 MG tablet Take 10 mg by mouth once as needed for Migraine May repeat in 2 hours if needed   Yes Historical Provider, MD   Magnesium 400 MG CAPS Take by mouth   Yes Historical Provider, MD   buPROPion (WELLBUTRIN) 75 MG tablet Take 75 mg by mouth daily 11/16/15  Yes Historical Provider, MD   furosemide (LASIX) 20 MG tablet Take 20 mg by mouth daily as needed 10/8/15  Yes Historical Provider, MD   butalbital-acetaminophen-caffeine (FIORICET, ESGIC) -40 MG per tablet Take 1 tablet by mouth as needed for Headaches  1/6/15  Yes Historical Provider, MD   escitalopram (LEXAPRO) 20 MG tablet Take 20 mg by mouth daily  2/9/15  Yes Historical Provider, MD   levothyroxine (SYNTHROID) 125 MCG tablet Take 125 mcg by mouth daily  2/9/15  Yes Historical Provider, MD   ondansetron (ZOFRAN) 4 MG tablet Take 4 mg by mouth every 8 hours as needed for Nausea  1/28/15  Yes Historical Provider, MD   TRANSDERM-SCOP, 1.5 MG, TRANSDERMAL PATCH  6/9/18   Historical Provider, MD   tolterodine (DETROL LA) 4 MG extended release capsule Take 4 mg by mouth daily    Historical Provider, MD       Review of Systems    Review of Systems:  History obtained from chart review and the patient  General ROS: negative for - chills, fatigue, fever, night sweats or weight gain  Constitutional: Negative for chills, diaphoresis, fatigue, fever and unexpected weight change. Musculoskeletal: Positive for arthralgias, gait problem and joint swelling. Neurological ROS: negative for - behavioral changes, confusion, headaches or seizures. Negative for weakness and numbness. Dermatological ROS: negative for - mole changes, rash  Cardiovascular: Negative for leg swelling. Gastrointestinal: Negative for constipation, diarrhea, nausea and vomiting. Lower Extremity Physical Examination:     Vitals: There were no vitals filed for this visit. General: AAO x 3 in NAD. Dermatologic Exam:  Skin lesion/ulceration Absent . Skin No rashes or nodules noted. .       Musculoskeletal:     1st MPJ ROM decreased, Bilateral.  Muscle strength 5/5, Bilateral.  Pain present upon palpation of left 2nd PIPJ with erythema. NO EDEMA AND NO ecchymosis. Medial longitudinal arch, Bilateral WNL. Ankle ROM WNL,Bilateral.    Dorsally contracted digits absent digits 1-5 Bilateral.     Vascular: DP and PT pulses palpable 2/4, Bilateral.  CFT <3 seconds, Bilateral.  Hair growth present to the level of the digits, Bilateral.  Edema absent, Bilateral.  Varicosities absent, Bilateral. Erythema absent, Bilateral    Neurological: Sensation intact to light touch to level of digits, Bilateral.  Protective sensation intact 10/10 sites via 5.07/10g Butler-Darryl Monofilament, Bilateral.  negative Tinel's, Bilateral.  negative Valleix sign, Bilateral.      Integument: Warm, dry, supple, Bilateral.  Open lesion absent, Bilateral.  Interdigital maceration absent to web spaces 1-4, Bilateral.  Nails are normal in length, thickness and color 1-5 bilateral.  Fissures absent, Bilateral.       X rays left 3 views show:  Fracture of the proxomal phalanx of the 2nd toe healed. Hammer toe contracture of the 2nd and 3rd DIPJ       Asessment: Patient is a 48 y.o. female with:    Diagnosis Orders   1. Left foot pain     2. Closed nondisplaced fracture of proximal phalanx of lesser toe of left foot, initial encounter         Plan: Patient examined and evaluated. Current condition and treatment options discussed in detail. Advised pt to her x ray 3 views left foot findings.   She has minimal pain and can stop catherine taping the toe. Verbal and written instructions given to patient. Contact office with any questions/problems/concerns. Pt has no restrictions. No orders of the defined types were placed in this encounter. No orders of the defined types were placed in this encounter.        [facility]  4/22/2019      Electronically signed by Viji Sadler DPM on 4/22/2019 at 10:16 AM  4/22/2019

## 2019-04-24 PROBLEM — R05.9 COUGH: Status: RESOLVED | Noted: 2019-01-17 | Resolved: 2019-04-24

## 2019-06-05 ENCOUNTER — OFFICE VISIT (OUTPATIENT)
Dept: PODIATRY | Age: 54
End: 2019-06-05
Payer: COMMERCIAL

## 2019-06-05 VITALS — WEIGHT: 246 LBS | BODY MASS INDEX: 40.98 KG/M2 | HEIGHT: 65 IN

## 2019-06-05 DIAGNOSIS — M25.572 ACUTE LEFT ANKLE PAIN: ICD-10-CM

## 2019-06-05 DIAGNOSIS — M76.72 PERONEAL TENDONITIS, LEFT: ICD-10-CM

## 2019-06-05 PROBLEM — M19.011 ARTHRITIS OF RIGHT ACROMIOCLAVICULAR JOINT: Status: ACTIVE | Noted: 2019-05-20

## 2019-06-05 PROCEDURE — G8427 DOCREV CUR MEDS BY ELIG CLIN: HCPCS | Performed by: PODIATRIST

## 2019-06-05 PROCEDURE — 3017F COLORECTAL CA SCREEN DOC REV: CPT | Performed by: PODIATRIST

## 2019-06-05 PROCEDURE — 1036F TOBACCO NON-USER: CPT | Performed by: PODIATRIST

## 2019-06-05 PROCEDURE — G8417 CALC BMI ABV UP PARAM F/U: HCPCS | Performed by: PODIATRIST

## 2019-06-05 PROCEDURE — 99213 OFFICE O/P EST LOW 20 MIN: CPT | Performed by: PODIATRIST

## 2019-06-05 NOTE — PROGRESS NOTES
[address] [facility] [address] [facility]  [place] [address]  Dept: [phone]    RETURN PATIENT PROGRESS NOTE  Date of patient's visit: 6/5/2019  Patient's Name:  Erwin Ross YOB: 1965            Patient Care Team:  Analilia Lopez DO as PCP - General (Family Medicine)  Viji Galvezchcarrillo, DPM as Consulting Physician (Podiatry)       Erwin Ross 48 y.o. female that presents for follow-up of   Chief Complaint   Patient presents with    Ankle Pain     left ankle     Pt's primary care physician is Analilia Lopez DO last seen 5/17/2019  Symptoms began 2 month(s) ago and are increased . Patient relates pain is Present. Pain is rated 5 out of 10 and is described as intermittent. Treatments prior to today's visit include: ankle brace. Currently denies F/C/N/V. Patient states 2 months ago she slipped on her kitchen floor. She states 2 days later her ankle began to hurt. She started wearing the ankle brace. Allergies   Allergen Reactions    Hylan G-F 20 Swelling and Other (See Comments)     Edema  Simvisc    Nsaids Other (See Comments)     Ulcers  Other reaction(s): GI Upset  Gastritis and ulcers  Ulcers      Alprazolam Other (See Comments)     hyperactivity  Other reaction(s): Intolerance  hyperactivity    Amoxicillin      Other reaction(s): GI Disturbance    Ativan [Lorazepam] Other (See Comments)     \"jittery\"  Makes hyper    Augmentin [Amoxicillin-Pot Clavulanate] Nausea Only    Bactrim [Sulfamethoxazole-Trimethoprim]      Intolerance    Cleocin [Clindamycin Hcl] Photosensitivity    Erythromycin Nausea Only       Past Medical History:   Diagnosis Date    Acid reflux     Anxiety disorder     Arthritis     Bronchitis     With sinus infections    Depression     Hypothyroidism     Migraine headache     Sleep apnea        Prior to Admission medications    Medication Sig Start Date End Date Taking?  Authorizing Provider   lansoprazole (PREVACID) 30 MG delayed release capsule Take 30 mg by mouth 1/31/17  Yes Historical Provider, MD   sucralfate (CARAFATE) 1 GM tablet Take 1 g by mouth 3/6/19  Yes Historical Provider, MD   ranitidine (ZANTAC) 150 MG tablet Take 150 mg by mouth 3/21/19  Yes Historical Provider, MD   lamoTRIgine (LAMICTAL) 100 MG tablet Take 100 mg by mouth   Yes Historical Provider, MD   pantoprazole (PROTONIX) 40 MG tablet Take 40 mg by mouth 2/14/19  Yes Historical Provider, MD   Lutein-Zeaxanthin 25-5 MG CAPS lutein-zeaxanthin 25 mg-5 mg capsule   Take 1 capsule every day by oral route. Yes Historical Provider, MD   LORazepam (ATIVAN) 0.5 MG tablet lorazepam 0.5 mg tablet   Take 1 tablet twice a day by oral route as needed for 30 days.  2/6/19  Yes Historical Provider, MD   loratadine (CLARITIN) 10 MG tablet Take 10 mg by mouth 10/5/18  Yes Historical Provider, MD   ibuprofen (ADVIL;MOTRIN) 800 MG tablet ibuprofen 800 mg tablet 1/18/19  Yes Historical Provider, MD   fosfomycin tromethamine (MONUROL) 3 g PACK Monurol 3 gram oral packet   Yes Historical Provider, MD   fluticasone (VERAMYST) 27.5 MCG/SPRAY nasal spray 2 sprays by NOT APPLICABLE route 6/39/28  Yes Historical Provider, MD   cetirizine (ZYRTEC ALLERGY) 10 MG tablet Take 10 mg by mouth 1/31/17  Yes Historical Provider, MD   TRINTELLIX 20 MG TABS tablet TAKE 1 TABLET BY MOUTH ONE TIME A DAY 6/5/18  Yes Historical Provider, MD   TRANSDERM-SCOP, 1.5 MG, TRANSDERMAL PATCH  6/9/18  Yes Historical Provider, MD   busPIRone (BUSPAR) 15 MG tablet TAKE 1 TABLET BY MOUTH TWO TIMES A DAY 6/4/18  Yes Historical Provider, MD   DYMISTA 137-50 MCG/ACT SUSP INSTILL 1 SPRAY IN EACH NOSTRIL TWICE DAILY 4/28/18  Yes Historical Provider, MD   rOPINIRole (REQUIP) 0.5 MG tablet Take 0.5 mg by mouth 10/26/17  Yes Historical Provider, MD   methocarbamol (ROBAXIN) 500 MG tablet Take 500 mg by mouth every 6 hours as needed   Yes Historical Provider, MD   tolterodine (DETROL LA) 4 MG extended release capsule Take 4 mg by mouth daily   Yes Historical Provider, MD   budesonide-formoterol (SYMBICORT) 160-4.5 MCG/ACT AERO Inhale 2 puffs into the lungs 2 times daily   Yes Historical Provider, MD   albuterol sulfate HFA (PROAIR HFA) 108 (90 BASE) MCG/ACT inhaler Inhale 2 puffs into the lungs every 6 hours as needed for Wheezing   Yes Historical Provider, MD   Pseudoephedrine-guaiFENesin (MUCINEX D) 120-1200 MG TB12 Take 1,200 mg by mouth 2 times daily   Yes Historical Provider, MD   estradiol (ESTRACE) 0.1 MG/GM vaginal cream Place 2 g vaginally Twice a Week   Yes Historical Provider, MD   Immune Globulin, Human, 10 GM/50ML SOLN Inject into the skin   Yes Historical Provider, MD   rizatriptan (MAXALT) 10 MG tablet Take 10 mg by mouth once as needed for Migraine May repeat in 2 hours if needed   Yes Historical Provider, MD   Magnesium 400 MG CAPS Take by mouth   Yes Historical Provider, MD   buPROPion (WELLBUTRIN) 75 MG tablet Take 75 mg by mouth daily 11/16/15  Yes Historical Provider, MD   furosemide (LASIX) 20 MG tablet Take 20 mg by mouth daily as needed 10/8/15  Yes Historical Provider, MD   butalbital-acetaminophen-caffeine (FIORICET, ESGIC) -40 MG per tablet Take 1 tablet by mouth as needed for Headaches  1/6/15  Yes Historical Provider, MD   escitalopram (LEXAPRO) 20 MG tablet Take 20 mg by mouth daily  2/9/15  Yes Historical Provider, MD   levothyroxine (SYNTHROID) 125 MCG tablet Take 125 mcg by mouth daily  2/9/15  Yes Historical Provider, MD   ondansetron (ZOFRAN) 4 MG tablet Take 4 mg by mouth every 8 hours as needed for Nausea  1/28/15  Yes Historical Provider, MD       Review of Systems    Review of Systems:  History obtained from chart review and the patient  General ROS: negative for - chills, fatigue, fever, night sweats or weight gain  Constitutional: Negative for chills, diaphoresis, fatigue, fever and unexpected weight change.   Musculoskeletal: Positive for arthralgias, gait problem and joint swelling. Neurological ROS: negative for - behavioral changes, confusion, headaches or seizures. Negative for weakness and numbness. Dermatological ROS: negative for - mole changes, rash  Cardiovascular: Negative for leg swelling. Gastrointestinal: Negative for constipation, diarrhea, nausea and vomiting. Lower Extremity Physical Examination:     Vitals: There were no vitals filed for this visit. General: AAO x 3 in NAD. Dermatologic Exam:  Skin lesion/ulceration Absent . Skin No rashes or nodules noted. .       Musculoskeletal:     1st MPJ ROM decreased, Bilateral.  Muscle strength 5/5, Bilateral.  Pain present upon palpation of left ankle at the ATFL and CFL. Negative anterior drawer sign. Pain to the peroneal tendons and pain with eversion. Minimal ecchymosis and edema present. .  Medial longitudinal arch, Bilateral WNL. Ankle ROM WNL,Bilateral.    Dorsally contracted digits absent digits 1-5 Bilateral.     Vascular: DP and PT pulses palpable 2/4, Bilateral.  CFT <3 seconds, Bilateral.  Hair growth present to the level of the digits, Bilateral.  Edema absent, Bilateral.  Varicosities absent, Bilateral. Erythema absent, Bilateral    Neurological: Sensation intact to light touch to level of digits, Bilateral.  Protective sensation intact 10/10 sites via 5.07/10g Agar-Darryl Monofilament, Bilateral.  negative Tinel's, Bilateral.  negative Valleix sign, Bilateral.      Integument: Warm, dry, supple, Bilateral.  Open lesion absent, Bilateral.  Interdigital maceration absent to web spaces 1-4, Bilateral.  Nails are normal in length, thickness and color 1-5 bilateral.  Fissures absent, Bilateral.       Asessment: Patient is a 48 y.o. female with:    Diagnosis Orders   1. Grade 3 ankle sprain, left, initial encounter     2. Peroneal tendonitis, left     3. Acute left ankle pain           Plan: Patient examined and evaluated.   Current condition and treatment options discussed in detail. Advised pt to go to her CAM boot that she has. She just had shoulder surgery and is recovering from that. Sleep in the CAM boot to rest the ankle and keep it in anatomical positioning. .  Verbal and written instructions given to patient. Contact office with any questions/problems/concerns. No orders of the defined types were placed in this encounter. No orders of the defined types were placed in this encounter. RTC in 4week(s).     6/5/2019      Electronically signed by Maite Sr DPM on 6/5/2019 at 2:42 PM  6/5/2019

## 2019-06-10 ENCOUNTER — HOSPITAL ENCOUNTER (OUTPATIENT)
Dept: ULTRASOUND IMAGING | Age: 54
Discharge: HOME | End: 2019-06-12
Payer: COMMERCIAL

## 2019-06-10 PROCEDURE — 76882 US LMTD JT/FCL EVL NVASC XTR: CPT

## 2019-06-12 DIAGNOSIS — M72.2 PLANTAR FASCIAL FIBROMATOSIS: Primary | ICD-10-CM

## 2019-06-12 PROCEDURE — L3020 FOOT LONGITUD/METATARSAL SUP: HCPCS | Performed by: PODIATRIST

## 2019-06-12 NOTE — PROGRESS NOTES
Patient is here to get casted for orthotics. We will call her when her orthotics are received in the office.  CLARKE

## 2019-07-08 ENCOUNTER — HOSPITAL ENCOUNTER (OUTPATIENT)
Dept: MRI IMAGING | Age: 54
Discharge: HOME | End: 2019-07-10
Payer: COMMERCIAL

## 2019-07-08 DIAGNOSIS — R22.42 LUMP OF LEFT THIGH: ICD-10-CM

## 2019-07-08 PROCEDURE — 2580000003 HC RX 258: Performed by: SURGERY

## 2019-07-08 PROCEDURE — 73720 MRI LWR EXTREMITY W/O&W/DYE: CPT

## 2019-07-08 PROCEDURE — A9579 GAD-BASE MR CONTRAST NOS,1ML: HCPCS | Performed by: SURGERY

## 2019-07-08 PROCEDURE — 6360000004 HC RX CONTRAST MEDICATION: Performed by: SURGERY

## 2019-07-08 RX ORDER — SODIUM CHLORIDE 0.9 % (FLUSH) 0.9 %
10 SYRINGE (ML) INJECTION PRN
Status: DISCONTINUED | OUTPATIENT
Start: 2019-07-08 | End: 2019-07-11 | Stop reason: HOSPADM

## 2019-07-08 RX ORDER — 0.9 % SODIUM CHLORIDE 0.9 %
50 INTRAVENOUS SOLUTION INTRAVENOUS ONCE
Status: COMPLETED | OUTPATIENT
Start: 2019-07-08 | End: 2019-07-08

## 2019-07-08 RX ADMIN — GADOTERIDOL 20 ML: 279.3 INJECTION, SOLUTION INTRAVENOUS at 21:28

## 2019-07-08 RX ADMIN — Medication 10 ML: at 21:28

## 2019-07-08 RX ADMIN — SODIUM CHLORIDE 50 ML: 9 INJECTION, SOLUTION INTRAVENOUS at 21:28

## 2019-07-15 ENCOUNTER — HOSPITAL ENCOUNTER (OUTPATIENT)
Age: 54
Discharge: HOME | End: 2019-07-15
Payer: COMMERCIAL

## 2019-07-15 LAB
ALBUMIN SERPL-MCNC: 4 G/DL (ref 3.5–5.2)
ALBUMIN/GLOBULIN RATIO: 1.3 (ref 1–2.5)
ALP BLD-CCNC: 95 U/L (ref 35–104)
ALT SERPL-CCNC: 46 U/L (ref 5–33)
AST SERPL-CCNC: 37 U/L
BILIRUB SERPL-MCNC: 0.31 MG/DL (ref 0.3–1.2)
BILIRUBIN DIRECT: 0.11 MG/DL
BILIRUBIN, INDIRECT: 0.2 MG/DL (ref 0–1)
GLOBULIN: ABNORMAL G/DL (ref 1.5–3.8)
TOTAL PROTEIN: 7.2 G/DL (ref 6.4–8.3)

## 2019-07-15 PROCEDURE — 36415 COLL VENOUS BLD VENIPUNCTURE: CPT

## 2019-07-15 PROCEDURE — 80076 HEPATIC FUNCTION PANEL: CPT

## 2019-08-01 ENCOUNTER — OFFICE VISIT (OUTPATIENT)
Dept: INFECTIOUS DISEASES | Age: 54
End: 2019-08-01
Payer: COMMERCIAL

## 2019-08-01 VITALS
DIASTOLIC BLOOD PRESSURE: 85 MMHG | HEART RATE: 71 BPM | HEIGHT: 65 IN | TEMPERATURE: 97.6 F | RESPIRATION RATE: 12 BRPM | WEIGHT: 246.8 LBS | SYSTOLIC BLOOD PRESSURE: 130 MMHG | BODY MASS INDEX: 41.12 KG/M2

## 2019-08-01 DIAGNOSIS — N39.0 RECURRENT URINARY TRACT INFECTION: Primary | ICD-10-CM

## 2019-08-01 DIAGNOSIS — Z86.19 HISTORY OF EXTENDED-SPECTRUM BETA-LACTAMASE PRODUCING ESCHERICHIA COLI INFECTION: ICD-10-CM

## 2019-08-01 PROBLEM — R06.09 DOE (DYSPNEA ON EXERTION): Status: ACTIVE | Noted: 2019-06-07

## 2019-08-01 PROBLEM — R07.9 CHEST PAIN: Status: ACTIVE | Noted: 2019-07-15

## 2019-08-01 PROBLEM — I44.7 LBBB (LEFT BUNDLE BRANCH BLOCK): Status: ACTIVE | Noted: 2019-07-16

## 2019-08-01 PROCEDURE — 99203 OFFICE O/P NEW LOW 30 MIN: CPT | Performed by: INTERNAL MEDICINE

## 2019-08-01 PROCEDURE — 3017F COLORECTAL CA SCREEN DOC REV: CPT | Performed by: INTERNAL MEDICINE

## 2019-08-01 PROCEDURE — G8417 CALC BMI ABV UP PARAM F/U: HCPCS | Performed by: INTERNAL MEDICINE

## 2019-08-01 PROCEDURE — 1036F TOBACCO NON-USER: CPT | Performed by: INTERNAL MEDICINE

## 2019-08-01 PROCEDURE — G8427 DOCREV CUR MEDS BY ELIG CLIN: HCPCS | Performed by: INTERNAL MEDICINE

## 2019-08-01 ASSESSMENT — ENCOUNTER SYMPTOMS
NAUSEA: 1
VOMITING: 1
RESPIRATORY NEGATIVE: 1

## 2019-08-01 NOTE — PROGRESS NOTES
[facility]   Office Consult Note  Today's Date and Time: 8/1/2019, 10:27 AM    Impression:     1. Recurrent urinary tract infection    2. History of extended-spectrum beta-lactamase producing Escherichia coli infection         Recommendations   · The patient is currently assymptomatic and does not need any work-up. · I did discuss with taking d-mannose which is a supplement and at times will help people who are colonized with the E. Coli. · Given that fosfomycin typically works for her urinary tract infections we we will plan on using this with future infections. · I did tell her that typically I will order a UA urine culture though we can empirically start her on the antimicrobial therapy. I have ordered the following medications/ labs:  No orders of the defined types were placed in this encounter. No orders of the defined types were placed in this encounter. Chief complaint/reason for consultation:     Chief Complaint   Patient presents with    New Patient     Recurrent UTI         History of Present Illness:   Henrietta Kocher is a 47y.o.-year-old female who is seen at there request of Mark Noyola DO . Sana Hollins is seen in the office for the first time and was previously seen by Dr. Sami Keenan but she tells me that she was frustrated with the office as she was not getting the communication that she liked. Sana Hollins has a history of C VID and has recurrent sinusitis/pneumonia and she has been on weekly IVIG therapy, hypothyroidism, chronic thrombocytopenia, migraine headaches, asthma, fatty liver, carpal tunnel syndrome, osteoarthritis of the knee, positional vertigo among other medical problems. The patient reports having TBT of the urethra for stress incontinence. She also reports a history of an overactive bladder for which she has had Botox therapy  She tells me that she has a 2-year history of ESBL E. coli urinary tract infection for which she was followed by Dr. Sami Keenan. 1/27/2017    Morbid obesity ([address] 1/27/2017    Morbid obesity with BMI of 40.0-44.9, adult ([address] 12/4/2017    Narcolepsy 8/72/1764    Nonalcoholic fatty liver disease 1/27/2017    Obesity (BMI 30-39.9) 1/10/2017    Pain due to knee joint prosthesis ([address] 4/13/2017    Pain due to total knee replacement ([address] 5/16/2017    Pain due to total right knee replacement ([address] 4/13/2017    Pain in pelvis 9/20/2017    PONV (postoperative nausea and vomiting) 3/27/2018    Primary osteoarthritis of right knee 7/19/2017    Prolonged emergence from general anesthesia 3/27/2018    Recurrent urinary tract infection 8/17/2017    Restless leg 8/24/2017    Right knee pain 1/10/2017    Sleep apnea     Status post arthroscopy of shoulder 1/2/2018    Stress incontinence 1/27/2017    Superior glenoid labrum lesion of shoulder 1/27/2017    Thrombocytopenic disorder ([address] 4/13/2017    Urinary urgency 2/20/2015    UTI due to extended-spectrum beta lactamase (ESBL) producing Escherichia coli 12/18/2017     Past Surgical  History:     Past Surgical History:   Procedure Laterality Date    APPENDECTOMY  11/99    CARPAL TUNNEL RELEASE Right 12/96    Again with trigger finger 12/98    CHOLECYSTECTOMY  6/95    JOINT REPLACEMENT Right 10/09    Knee    JOINT REPLACEMENT Left 3/13    Knee    KNEE ARTHROSCOPY Right     LIVER BIOPSY  11/13/2015    NECK SURGERY      Repair herniated C5-6    NOSE SURGERY  8/2000    Septoplasty, sinuosotomies, hypertrophy turbinates    SHOULDER SURGERY      Manipulation and arthroscopy 12/10    SPINE SURGERY      TENDON RELEASE Right     thumb    TOTAL KNEE ARTHROPLASTY Right     partial      Medications:     Current Outpatient Medications   Medication Sig Dispense Refill    lamoTRIgine (LAMICTAL) 100 MG tablet Take 100 mg by mouth      pantoprazole (PROTONIX) 40 MG tablet Take 40 mg by mouth      Lutein-Zeaxanthin 25-5 MG CAPS lutein-zeaxanthin 25 mg-5 mg capsule   Take 1 capsule every activity:     Days per week: Not on file     Minutes per session: Not on file    Stress: Not on file   Relationships    Social connections:     Talks on phone: Not on file     Gets together: Not on file     Attends [employer] service: Not on file     Active member of club or organization: Not on file     Attends meetings of clubs or organizations: Not on file     Relationship status: Not on file    Intimate partner violence:     Fear of current or ex partner: Not on file     Emotionally abused: Not on file     Physically abused: Not on file     Forced sexual activity: Not on file   Other Topics Concern    Not on file   Social History Narrative    Not on file     Family History:   No family history on file. Allergies:   Hylan g-f 20; Nsaids; Alprazolam; Amoxicillin; Ativan [lorazepam]; Augmentin [amoxicillin-pot clavulanate]; Bactrim [sulfamethoxazole-trimethoprim]; Cleocin [clindamycin hcl]; and Erythromycin     Review of Systems:   Review of Systems   HENT: Negative. Respiratory: Negative. Cardiovascular: Negative. Gastrointestinal: Positive for nausea and vomiting. Genitourinary: Negative. Musculoskeletal: Negative. Neurological: Negative. Physical Examination :   /85 (Site: Left Upper Arm, Position: Sitting, Cuff Size: Small Adult)   Pulse 71   Temp 97.6 °F (36.4 °C) (Oral)   Resp 12   Ht 5' 5\" (1.651 m)   Wt 246 lb 12.8 oz (111.9 kg)   Breastfeeding? No   BMI 41.07 kg/m²     Physical Exam   Constitutional: She is oriented to person, place, and time. HENT:   Head: Normocephalic and atraumatic. Mouth/Throat: Oropharynx is clear and moist.   Eyes: Pupils are equal, round, and reactive to light. No scleral icterus. Neck: Normal range of motion. Neck supple. Cardiovascular: Normal rate and normal heart sounds. No murmur heard. Pulmonary/Chest: Effort normal and breath sounds normal. She has no wheezes. She has no rales. Abdominal: Soft.  Bowel sounds are normal. >59 ml/min/1.73sq.m [address]     GFR MDRD Af Amer >60 >59 ml/min/1.73sq.m [address]    Comprehensive metabolic panel (86/04/3360 3:13 PM EDT)  Only the most recent of 2 results within the time period is included.    Comprehensive metabolic panel (19/81/3908 3:13 PM EDT)   Component Value Ref Range Performed At Pathologist [facility]   Sodium 138 134 - 146 mmol/L [address]     Potassium, Bld 4.0 3.5 - 5.0 mmol/L [facility] 48     Chloride 103 98 - 109 mmol/L [address]     CO2 25 22 - 32 mmol/L [address]     Anion gap 10 4 - 12 mmol/L [address]     BUN 13 5 - 23 mg/dL [address]     Creatinine 1.05 (H)Comment: METHOD TRACEABLE TO IDMS STANDARD 0.40 - 1.00 mg/dL [address]     Glucose 92 65 - 99 mg/dL [address]     Calcium 9.1 8.5 - 10.5 mg/dL [address]     Total Protein 7.4 6.0 - 8.0 g/dL [place] [id number]     Albumin 4.2 3.2 - 5.3 g/dL [facility] 48     Alkaline Phosphatase 93 39 - 130 U/L [address]     AST 42 (H) 0 - 41 U/L [address]     ALT 53 (H) 0 - 31 U/L [address]     Total bilirubin 0.3 0.3 - 1.2 mg/dL [address]     GFR MDRD Non Af Amer 55 (L) >59 ml/min/1.73sq.m [address]     GFR MDRD Af Amer >60 >59 ml/min/1.73sq.m [address]       Urinalysis with reflex culture (07/13/2019 10:03 AM EDT)  Only the most recent of 2 results within the time period is included.    Urinalysis with reflex culture (07/13/2019 10:03 AM EDT)   Component Value Ref Range Performed At Pathologist [id number] [facility]     Turbidity CLEAR CLEAR^CLEAR [facility]     Specific gravity 1.021 1.003 - 1.035 [facility]     Nitrite Negative Nitrofurantoin EDDIE METHOD 128: Resistant     Escherichia Coli PIPERACIL/TAZOBACTAM EDDIE METHOD <=4: Susceptible     Escherichia Coli Tobramycin EDDIE METHOD >=16: Resistant     Escherichia Coli TRIMETH/SULFAMETHOXAZOLE EDDIE METHOD >=16/304: Resistant     Escherichia Coli ESBL Test EDDIE METHOD POSITIVE           Thank you for allowing us to participate in the care of this patient. Pleasecall with questions. Padmini Michele MD  [employer] messaging: [phone]    This note is created with the assistance of a speech recognition program.  While intending to generate a document that actually reflects the content ofthe visit, the document can still have some errors including those of syntax and sound a like substitutions which may escape proof reading. It such instances, actual meaning can be extrapolated by contextual diversion.

## 2019-08-14 ENCOUNTER — HOSPITAL ENCOUNTER (OUTPATIENT)
Age: 54
Discharge: HOME | End: 2019-08-14
Payer: COMMERCIAL

## 2019-08-14 LAB
ALBUMIN SERPL-MCNC: 4 G/DL (ref 3.5–5.2)
ALBUMIN/GLOBULIN RATIO: 1.4 (ref 1–2.5)
ALP BLD-CCNC: 82 U/L (ref 35–104)
ALT SERPL-CCNC: 29 U/L (ref 5–33)
AST SERPL-CCNC: 28 U/L
BILIRUB SERPL-MCNC: 0.28 MG/DL (ref 0.3–1.2)
BILIRUBIN DIRECT: 0.1 MG/DL
BILIRUBIN, INDIRECT: 0.18 MG/DL (ref 0–1)
GLOBULIN: ABNORMAL G/DL (ref 1.5–3.8)
TOTAL PROTEIN: 6.9 G/DL (ref 6.4–8.3)

## 2019-08-14 PROCEDURE — 36415 COLL VENOUS BLD VENIPUNCTURE: CPT

## 2019-08-14 PROCEDURE — 80076 HEPATIC FUNCTION PANEL: CPT

## 2019-09-07 ENCOUNTER — HOSPITAL ENCOUNTER (OUTPATIENT)
Age: 54
Discharge: HOME | End: 2019-09-07
Payer: COMMERCIAL

## 2019-09-07 LAB
ALBUMIN SERPL-MCNC: 4.1 G/DL (ref 3.5–5.2)
ALBUMIN/GLOBULIN RATIO: 1.5 (ref 1–2.5)
ALP BLD-CCNC: 86 U/L (ref 35–104)
ALT SERPL-CCNC: 32 U/L (ref 5–33)
AST SERPL-CCNC: 27 U/L
BILIRUB SERPL-MCNC: 0.27 MG/DL (ref 0.3–1.2)
BILIRUBIN DIRECT: 0.09 MG/DL
BILIRUBIN, INDIRECT: 0.18 MG/DL (ref 0–1)
GLOBULIN: ABNORMAL G/DL (ref 1.5–3.8)
TOTAL PROTEIN: 6.9 G/DL (ref 6.4–8.3)

## 2019-09-07 PROCEDURE — 80076 HEPATIC FUNCTION PANEL: CPT

## 2019-09-07 PROCEDURE — 36415 COLL VENOUS BLD VENIPUNCTURE: CPT

## 2019-09-16 ENCOUNTER — TELEPHONE (OUTPATIENT)
Dept: INFECTIOUS DISEASES | Age: 54
End: 2019-09-16

## 2019-09-16 ENCOUNTER — HOSPITAL ENCOUNTER (OUTPATIENT)
Age: 54
Discharge: HOME | End: 2019-09-16
Payer: COMMERCIAL

## 2019-09-16 DIAGNOSIS — N39.0 RECURRENT URINARY TRACT INFECTION: Primary | ICD-10-CM

## 2019-09-16 DIAGNOSIS — N39.0 RECURRENT URINARY TRACT INFECTION: ICD-10-CM

## 2019-09-16 LAB
-: ABNORMAL
AMORPHOUS: ABNORMAL
BACTERIA: ABNORMAL
BILIRUBIN URINE: NEGATIVE
CASTS UA: ABNORMAL /LPF (ref 0–8)
COLOR: YELLOW
COMMENT UA: ABNORMAL
CRYSTALS, UA: ABNORMAL /HPF
EPITHELIAL CELLS UA: ABNORMAL /HPF (ref 0–5)
GLUCOSE URINE: NEGATIVE
KETONES, URINE: NEGATIVE
LEUKOCYTE ESTERASE, URINE: ABNORMAL
MUCUS: ABNORMAL
NITRITE, URINE: NEGATIVE
OTHER OBSERVATIONS UA: ABNORMAL
PH UA: 6 (ref 5–8)
PROTEIN UA: NEGATIVE
RBC UA: ABNORMAL /HPF (ref 0–4)
RENAL EPITHELIAL, UA: ABNORMAL /HPF
SPECIFIC GRAVITY UA: 1.01 (ref 1–1.03)
TRICHOMONAS: ABNORMAL
TURBIDITY: ABNORMAL
URINE HGB: ABNORMAL
UROBILINOGEN, URINE: NORMAL
WBC UA: ABNORMAL /HPF (ref 0–5)
YEAST: ABNORMAL

## 2019-09-16 PROCEDURE — 87186 SC STD MICRODIL/AGAR DIL: CPT

## 2019-09-16 PROCEDURE — 87086 URINE CULTURE/COLONY COUNT: CPT

## 2019-09-16 PROCEDURE — 87088 URINE BACTERIA CULTURE: CPT

## 2019-09-16 PROCEDURE — 81001 URINALYSIS AUTO W/SCOPE: CPT

## 2019-09-17 DIAGNOSIS — N39.0 RECURRENT URINARY TRACT INFECTION: Primary | ICD-10-CM

## 2019-09-17 DIAGNOSIS — Z86.19 HISTORY OF EXTENDED-SPECTRUM BETA-LACTAMASE PRODUCING ESCHERICHIA COLI INFECTION: ICD-10-CM

## 2019-09-17 RX ORDER — GRANULES FOR ORAL 3 G/1
3 POWDER ORAL ONCE
Qty: 1 EACH | Refills: 0 | Status: SHIPPED | OUTPATIENT
Start: 2019-09-17 | End: 2019-09-17

## 2019-09-18 ENCOUNTER — TELEPHONE (OUTPATIENT)
Dept: INFECTIOUS DISEASES | Age: 54
End: 2019-09-18

## 2019-09-18 LAB
CULTURE: ABNORMAL
Lab: ABNORMAL
SPECIMEN DESCRIPTION: ABNORMAL

## 2019-09-18 NOTE — TELEPHONE ENCOUNTER
Patient called stating she is throwing up and shaking really bad. She had shoulder surgery and the shaking is causing her great pain. She has not started the Fosfomycin because the pharmacy doesn't have it in yet. I advised her to start the medication as we want to monitor her response to it, however if she is feeling really bad she can go to the ER but she is hesitant to do that because she has a $150 deductible. How do you wish to proceed?

## 2019-09-20 ENCOUNTER — TELEPHONE (OUTPATIENT)
Dept: INFECTIOUS DISEASES | Age: 54
End: 2019-09-20

## 2019-09-20 ENCOUNTER — HOSPITAL ENCOUNTER (OUTPATIENT)
Age: 54
Discharge: HOME | End: 2019-09-20
Payer: COMMERCIAL

## 2019-09-20 ENCOUNTER — HOSPITAL ENCOUNTER (OUTPATIENT)
Dept: ONCOLOGY | Age: 54
Discharge: HOME | End: 2019-09-20
Attending: INTERNAL MEDICINE | Admitting: INTERNAL MEDICINE
Payer: COMMERCIAL

## 2019-09-20 VITALS
DIASTOLIC BLOOD PRESSURE: 91 MMHG | SYSTOLIC BLOOD PRESSURE: 140 MMHG | TEMPERATURE: 98.4 F | RESPIRATION RATE: 16 BRPM | HEART RATE: 65 BPM | OXYGEN SATURATION: 97 %

## 2019-09-20 DIAGNOSIS — Z16.12 UTI DUE TO EXTENDED-SPECTRUM BETA LACTAMASE (ESBL) PRODUCING ESCHERICHIA COLI: ICD-10-CM

## 2019-09-20 DIAGNOSIS — Z16.12 UTI DUE TO EXTENDED-SPECTRUM BETA LACTAMASE (ESBL) PRODUCING ESCHERICHIA COLI: Primary | ICD-10-CM

## 2019-09-20 DIAGNOSIS — N39.0 UTI DUE TO EXTENDED-SPECTRUM BETA LACTAMASE (ESBL) PRODUCING ESCHERICHIA COLI: Primary | ICD-10-CM

## 2019-09-20 DIAGNOSIS — B96.29 UTI DUE TO EXTENDED-SPECTRUM BETA LACTAMASE (ESBL) PRODUCING ESCHERICHIA COLI: Primary | ICD-10-CM

## 2019-09-20 DIAGNOSIS — B96.29 UTI DUE TO EXTENDED-SPECTRUM BETA LACTAMASE (ESBL) PRODUCING ESCHERICHIA COLI: ICD-10-CM

## 2019-09-20 DIAGNOSIS — N39.0 UTI DUE TO EXTENDED-SPECTRUM BETA LACTAMASE (ESBL) PRODUCING ESCHERICHIA COLI: ICD-10-CM

## 2019-09-20 LAB
ANION GAP SERPL CALCULATED.3IONS-SCNC: 12 MMOL/L (ref 9–17)
BUN BLDV-MCNC: 10 MG/DL (ref 6–20)
BUN/CREAT BLD: NORMAL (ref 9–20)
C-REACTIVE PROTEIN: 95.6 MG/L (ref 0–5)
CALCIUM SERPL-MCNC: 9.2 MG/DL (ref 8.6–10.4)
CHLORIDE BLD-SCNC: 107 MMOL/L (ref 98–107)
CO2: 25 MMOL/L (ref 20–31)
CREAT SERPL-MCNC: 0.8 MG/DL (ref 0.5–0.9)
GFR AFRICAN AMERICAN: >60 ML/MIN
GFR NON-AFRICAN AMERICAN: >60 ML/MIN
GFR SERPL CREATININE-BSD FRML MDRD: NORMAL ML/MIN/{1.73_M2}
GFR SERPL CREATININE-BSD FRML MDRD: NORMAL ML/MIN/{1.73_M2}
GLUCOSE BLD-MCNC: 97 MG/DL (ref 70–99)
HCT VFR BLD CALC: 39.7 % (ref 36.3–47.1)
HEMOGLOBIN: 12 G/DL (ref 11.9–15.1)
MCH RBC QN AUTO: 28.7 PG (ref 25.2–33.5)
MCHC RBC AUTO-ENTMCNC: 30.2 G/DL (ref 28.4–34.8)
MCV RBC AUTO: 95 FL (ref 82.6–102.9)
NRBC AUTOMATED: 0 PER 100 WBC
PDW BLD-RTO: 14 % (ref 11.8–14.4)
PLATELET # BLD: 129 K/UL (ref 138–453)
PMV BLD AUTO: 11.8 FL (ref 8.1–13.5)
POTASSIUM SERPL-SCNC: 4.6 MMOL/L (ref 3.7–5.3)
RBC # BLD: 4.18 M/UL (ref 3.95–5.11)
SODIUM BLD-SCNC: 144 MMOL/L (ref 135–144)
WBC # BLD: 6.6 K/UL (ref 3.5–11.3)

## 2019-09-20 PROCEDURE — 36563 INSERT TUNNELED CV CATH: CPT

## 2019-09-20 PROCEDURE — 85027 COMPLETE CBC AUTOMATED: CPT

## 2019-09-20 PROCEDURE — C1751 CATH, INF, PER/CENT/MIDLINE: HCPCS

## 2019-09-20 PROCEDURE — 76937 US GUIDE VASCULAR ACCESS: CPT

## 2019-09-20 PROCEDURE — 87040 BLOOD CULTURE FOR BACTERIA: CPT

## 2019-09-20 PROCEDURE — 36415 COLL VENOUS BLD VENIPUNCTURE: CPT

## 2019-09-20 PROCEDURE — 86140 C-REACTIVE PROTEIN: CPT

## 2019-09-20 PROCEDURE — 80048 BASIC METABOLIC PNL TOTAL CA: CPT

## 2019-09-20 NOTE — TELEPHONE ENCOUNTER
Latoya Vanessa MD 2019 1:20 PM Good Afternoon Dr. Vignesh Moscoso, Please call [facility] about orders for Soni Hugo (:1965). her number is [phone].  Gisell Villareal Read 2019 1:22 PM

## 2019-09-20 NOTE — TELEPHONE ENCOUNTER
Patient called and has UTI. Dr. Rafy Gaxiola called her then myself and asked I arrange to get midline placed at [facility]. He will arrange with [facility] for RX.  Called [facility] team and they can do today at 11:30AM.

## 2019-09-23 ENCOUNTER — TELEPHONE (OUTPATIENT)
Dept: INFECTIOUS DISEASES | Facility: CLINIC | Age: 54
End: 2019-09-23

## 2019-09-23 ENCOUNTER — TELEPHONE (OUTPATIENT)
Dept: INFECTIOUS DISEASES | Age: 54
End: 2019-09-23

## 2019-09-23 ENCOUNTER — HOSPITAL ENCOUNTER (OUTPATIENT)
Dept: INTERVENTIONAL RADIOLOGY/VASCULAR | Age: 54
Discharge: HOME | End: 2019-09-25
Payer: COMMERCIAL

## 2019-09-23 DIAGNOSIS — B96.29 UTI DUE TO EXTENDED-SPECTRUM BETA LACTAMASE (ESBL) PRODUCING ESCHERICHIA COLI: ICD-10-CM

## 2019-09-23 DIAGNOSIS — B96.29 UTI DUE TO EXTENDED-SPECTRUM BETA LACTAMASE (ESBL) PRODUCING ESCHERICHIA COLI: Primary | ICD-10-CM

## 2019-09-23 DIAGNOSIS — Z16.12 UTI DUE TO EXTENDED-SPECTRUM BETA LACTAMASE (ESBL) PRODUCING ESCHERICHIA COLI: Primary | ICD-10-CM

## 2019-09-23 DIAGNOSIS — N39.0 UTI DUE TO EXTENDED-SPECTRUM BETA LACTAMASE (ESBL) PRODUCING ESCHERICHIA COLI: ICD-10-CM

## 2019-09-23 DIAGNOSIS — Z16.12 UTI DUE TO EXTENDED-SPECTRUM BETA LACTAMASE (ESBL) PRODUCING ESCHERICHIA COLI: ICD-10-CM

## 2019-09-23 DIAGNOSIS — N39.0 UTI DUE TO EXTENDED-SPECTRUM BETA LACTAMASE (ESBL) PRODUCING ESCHERICHIA COLI: Primary | ICD-10-CM

## 2019-09-23 PROCEDURE — C1769 GUIDE WIRE: HCPCS

## 2019-09-23 PROCEDURE — 36573 INSJ PICC RS&I 5 YR+: CPT | Performed by: RADIOLOGY

## 2019-09-23 NOTE — TELEPHONE ENCOUNTER
Maximino Etienne [phone] called and the nurse called this weekend and got Dr. Limmie Lesch she could not get the midline unclogged. So Dr. Limmie Lesch said pull it can call us Monday. Patient wants to know if she can have a [facility] line put in if OK I will do order. She needs 8 more doses.  Thanks, Alis Read 9/23/2019 9:11 AM 9/23/2019 9:12 AM Thanks ok with me 9/23/2019 9:48 AM Thanks, Read 9/23/2019 9:48

## 2019-09-26 LAB
CULTURE: NORMAL
CULTURE: NORMAL
Lab: NORMAL
Lab: NORMAL
SPECIMEN DESCRIPTION: NORMAL
SPECIMEN DESCRIPTION: NORMAL

## 2019-09-30 ENCOUNTER — OFFICE VISIT (OUTPATIENT)
Dept: INFECTIOUS DISEASES | Age: 54
End: 2019-09-30
Payer: COMMERCIAL

## 2019-09-30 VITALS
SYSTOLIC BLOOD PRESSURE: 137 MMHG | HEART RATE: 64 BPM | HEIGHT: 65 IN | TEMPERATURE: 98.1 F | DIASTOLIC BLOOD PRESSURE: 78 MMHG | RESPIRATION RATE: 16 BRPM | BODY MASS INDEX: 40.48 KG/M2 | WEIGHT: 243 LBS

## 2019-09-30 DIAGNOSIS — Z16.12 UTI DUE TO EXTENDED-SPECTRUM BETA LACTAMASE (ESBL) PRODUCING ESCHERICHIA COLI: Primary | ICD-10-CM

## 2019-09-30 DIAGNOSIS — N39.0 UTI DUE TO EXTENDED-SPECTRUM BETA LACTAMASE (ESBL) PRODUCING ESCHERICHIA COLI: Primary | ICD-10-CM

## 2019-09-30 DIAGNOSIS — B96.29 UTI DUE TO EXTENDED-SPECTRUM BETA LACTAMASE (ESBL) PRODUCING ESCHERICHIA COLI: Primary | ICD-10-CM

## 2019-09-30 PROCEDURE — G8428 CUR MEDS NOT DOCUMENT: HCPCS | Performed by: INTERNAL MEDICINE

## 2019-09-30 PROCEDURE — 99214 OFFICE O/P EST MOD 30 MIN: CPT | Performed by: INTERNAL MEDICINE

## 2019-09-30 PROCEDURE — 1036F TOBACCO NON-USER: CPT | Performed by: INTERNAL MEDICINE

## 2019-09-30 PROCEDURE — G8417 CALC BMI ABV UP PARAM F/U: HCPCS | Performed by: INTERNAL MEDICINE

## 2019-09-30 PROCEDURE — 3017F COLORECTAL CA SCREEN DOC REV: CPT | Performed by: INTERNAL MEDICINE

## 2019-09-30 RX ORDER — GRANULES FOR ORAL 3 G/1
3 POWDER ORAL ONCE
Qty: 1 EACH | Refills: 0 | Status: SHIPPED | OUTPATIENT
Start: 2019-09-30 | End: 2019-11-14 | Stop reason: SDUPTHER

## 2019-09-30 ASSESSMENT — ENCOUNTER SYMPTOMS
GASTROINTESTINAL NEGATIVE: 1
RESPIRATORY NEGATIVE: 1

## 2019-10-04 ENCOUNTER — TELEPHONE (OUTPATIENT)
Dept: INFECTIOUS DISEASES | Age: 54
End: 2019-10-04

## 2019-10-08 ENCOUNTER — HOSPITAL ENCOUNTER (OUTPATIENT)
Age: 54
Discharge: HOME | End: 2019-10-08
Payer: COMMERCIAL

## 2019-10-08 LAB — LIPASE: 77 U/L (ref 13–60)

## 2019-10-08 PROCEDURE — 36415 COLL VENOUS BLD VENIPUNCTURE: CPT

## 2019-10-08 PROCEDURE — 83690 ASSAY OF LIPASE: CPT

## 2019-10-14 ENCOUNTER — HOSPITAL ENCOUNTER (OUTPATIENT)
Dept: PREADMISSION TESTING | Age: 54
Discharge: HOME | End: 2019-10-18
Payer: COMMERCIAL

## 2019-10-14 VITALS
SYSTOLIC BLOOD PRESSURE: 124 MMHG | DIASTOLIC BLOOD PRESSURE: 67 MMHG | HEIGHT: 65 IN | RESPIRATION RATE: 16 BRPM | HEART RATE: 66 BPM | OXYGEN SATURATION: 98 % | BODY MASS INDEX: 41.25 KG/M2 | WEIGHT: 247.58 LBS

## 2019-10-14 LAB
ABSOLUTE EOS #: 0.13 K/UL (ref 0–0.44)
ABSOLUTE IMMATURE GRANULOCYTE: 0.02 K/UL (ref 0–0.3)
ABSOLUTE LYMPH #: 1.21 K/UL (ref 1.1–3.7)
ABSOLUTE MONO #: 0.37 K/UL (ref 0.1–1.2)
ALBUMIN SERPL-MCNC: 4 G/DL (ref 3.5–5.2)
ALBUMIN/GLOBULIN RATIO: ABNORMAL (ref 1–2.5)
ALP BLD-CCNC: 92 U/L (ref 35–104)
ALT SERPL-CCNC: 33 U/L (ref 5–33)
ANION GAP SERPL CALCULATED.3IONS-SCNC: 13 MMOL/L (ref 9–17)
AST SERPL-CCNC: 27 U/L
BASOPHILS # BLD: 1 % (ref 0–2)
BASOPHILS ABSOLUTE: 0.04 K/UL (ref 0–0.2)
BILIRUB SERPL-MCNC: 0.25 MG/DL (ref 0.3–1.2)
BILIRUBIN DIRECT: 0.08 MG/DL
BILIRUBIN, INDIRECT: 0.17 MG/DL (ref 0–1)
BUN BLDV-MCNC: 18 MG/DL (ref 6–20)
CHLORIDE BLD-SCNC: 105 MMOL/L (ref 98–107)
CO2: 25 MMOL/L (ref 20–31)
CREAT SERPL-MCNC: 0.91 MG/DL (ref 0.5–0.9)
DIFFERENTIAL TYPE: ABNORMAL
EOSINOPHILS RELATIVE PERCENT: 3 % (ref 1–4)
GFR AFRICAN AMERICAN: >60 ML/MIN
GFR NON-AFRICAN AMERICAN: >60 ML/MIN
GFR SERPL CREATININE-BSD FRML MDRD: ABNORMAL ML/MIN/{1.73_M2}
GFR SERPL CREATININE-BSD FRML MDRD: ABNORMAL ML/MIN/{1.73_M2}
GLOBULIN: ABNORMAL G/DL (ref 1.5–3.8)
HCT VFR BLD CALC: 38.6 % (ref 36.3–47.1)
HEMOGLOBIN: 12.3 G/DL (ref 11.9–15.1)
IMMATURE GRANULOCYTES: 1 %
LYMPHOCYTES # BLD: 30 % (ref 24–43)
MCH RBC QN AUTO: 29.6 PG (ref 25.2–33.5)
MCHC RBC AUTO-ENTMCNC: 31.9 G/DL (ref 28.4–34.8)
MCV RBC AUTO: 92.8 FL (ref 82.6–102.9)
MONOCYTES # BLD: 9 % (ref 3–12)
NRBC AUTOMATED: 0 PER 100 WBC
PDW BLD-RTO: 13.6 % (ref 11.8–14.4)
PLATELET # BLD: 115 K/UL (ref 138–453)
PLATELET ESTIMATE: ABNORMAL
PMV BLD AUTO: 11.6 FL (ref 8.1–13.5)
POTASSIUM SERPL-SCNC: 4.5 MMOL/L (ref 3.7–5.3)
RBC # BLD: 4.16 M/UL (ref 3.95–5.11)
RBC # BLD: ABNORMAL 10*6/UL
SEG NEUTROPHILS: 56 % (ref 36–65)
SEGMENTED NEUTROPHILS ABSOLUTE COUNT: 2.32 K/UL (ref 1.5–8.1)
SODIUM BLD-SCNC: 143 MMOL/L (ref 135–144)
TOTAL PROTEIN: 6.7 G/DL (ref 6.4–8.3)
WBC # BLD: 4.1 K/UL (ref 3.5–11.3)
WBC # BLD: ABNORMAL 10*3/UL

## 2019-10-14 PROCEDURE — 36415 COLL VENOUS BLD VENIPUNCTURE: CPT

## 2019-10-14 PROCEDURE — 85025 COMPLETE CBC W/AUTO DIFF WBC: CPT

## 2019-10-14 PROCEDURE — 84520 ASSAY OF UREA NITROGEN: CPT

## 2019-10-14 PROCEDURE — 82565 ASSAY OF CREATININE: CPT

## 2019-10-14 PROCEDURE — 80076 HEPATIC FUNCTION PANEL: CPT

## 2019-10-14 PROCEDURE — 80051 ELECTROLYTE PANEL: CPT

## 2019-10-14 RX ORDER — OXYCODONE HYDROCHLORIDE AND ACETAMINOPHEN 5; 325 MG/1; MG/1
1 TABLET ORAL DAILY PRN
Status: ON HOLD | COMMUNITY
End: 2019-10-18 | Stop reason: HOSPADM

## 2019-10-14 RX ORDER — SUCRALFATE 1 G/1
1 TABLET ORAL 3 TIMES DAILY
COMMUNITY

## 2019-10-15 ENCOUNTER — ANESTHESIA EVENT (OUTPATIENT)
Dept: OPERATING ROOM | Age: 54
End: 2019-10-15
Payer: COMMERCIAL

## 2019-10-18 ENCOUNTER — ANESTHESIA (OUTPATIENT)
Dept: OPERATING ROOM | Age: 54
End: 2019-10-18
Payer: COMMERCIAL

## 2019-10-18 ENCOUNTER — HOSPITAL ENCOUNTER (OUTPATIENT)
Age: 54
Setting detail: OUTPATIENT SURGERY
Discharge: HOME | End: 2019-10-18
Attending: PODIATRIST | Admitting: PODIATRIST
Payer: COMMERCIAL

## 2019-10-18 VITALS
DIASTOLIC BLOOD PRESSURE: 55 MMHG | SYSTOLIC BLOOD PRESSURE: 109 MMHG | OXYGEN SATURATION: 100 % | RESPIRATION RATE: 20 BRPM

## 2019-10-18 VITALS
DIASTOLIC BLOOD PRESSURE: 82 MMHG | HEIGHT: 65 IN | BODY MASS INDEX: 41.25 KG/M2 | OXYGEN SATURATION: 97 % | TEMPERATURE: 97.3 F | SYSTOLIC BLOOD PRESSURE: 130 MMHG | RESPIRATION RATE: 22 BRPM | HEART RATE: 64 BPM | WEIGHT: 247.58 LBS

## 2019-10-18 DIAGNOSIS — G89.18 POST-OP PAIN: Primary | ICD-10-CM

## 2019-10-18 PROBLEM — M20.42 HAMMERTOE OF SECOND TOE OF LEFT FOOT: Status: ACTIVE | Noted: 2017-01-27

## 2019-10-18 PROCEDURE — 7100000010 HC PHASE II RECOVERY - FIRST 15 MIN: Performed by: PODIATRIST

## 2019-10-18 PROCEDURE — 28285 REPAIR OF HAMMERTOE: CPT | Performed by: PODIATRIST

## 2019-10-18 PROCEDURE — 6360000002 HC RX W HCPCS: Performed by: NURSE ANESTHETIST, CERTIFIED REGISTERED

## 2019-10-18 PROCEDURE — 7100000011 HC PHASE II RECOVERY - ADDTL 15 MIN: Performed by: PODIATRIST

## 2019-10-18 PROCEDURE — 3700000000 HC ANESTHESIA ATTENDED CARE: Performed by: PODIATRIST

## 2019-10-18 PROCEDURE — 2709999900 HC NON-CHARGEABLE SUPPLY: Performed by: PODIATRIST

## 2019-10-18 PROCEDURE — 6360000002 HC RX W HCPCS: Performed by: ANESTHESIOLOGY

## 2019-10-18 PROCEDURE — 2500000003 HC RX 250 WO HCPCS: Performed by: NURSE ANESTHETIST, CERTIFIED REGISTERED

## 2019-10-18 PROCEDURE — 3600000012 HC SURGERY LEVEL 2 ADDTL 15MIN: Performed by: PODIATRIST

## 2019-10-18 PROCEDURE — 3700000001 HC ADD 15 MINUTES (ANESTHESIA): Performed by: PODIATRIST

## 2019-10-18 PROCEDURE — C1713 ANCHOR/SCREW BN/BN,TIS/BN: HCPCS | Performed by: PODIATRIST

## 2019-10-18 PROCEDURE — 2500000003 HC RX 250 WO HCPCS: Performed by: STUDENT IN AN ORGANIZED HEALTH CARE EDUCATION/TRAINING PROGRAM

## 2019-10-18 PROCEDURE — 2580000003 HC RX 258: Performed by: ANESTHESIOLOGY

## 2019-10-18 PROCEDURE — 3600000002 HC SURGERY LEVEL 2 BASE: Performed by: PODIATRIST

## 2019-10-18 PROCEDURE — 2500000003 HC RX 250 WO HCPCS: Performed by: PODIATRIST

## 2019-10-18 DEVICE — WIRE FIX L152MM DIA16MM S STL 2 DMND PNT K: Type: IMPLANTABLE DEVICE | Site: SECOND TOE | Status: FUNCTIONAL

## 2019-10-18 RX ORDER — HYDRALAZINE HYDROCHLORIDE 20 MG/ML
5 INJECTION INTRAMUSCULAR; INTRAVENOUS EVERY 10 MIN PRN
Status: DISCONTINUED | OUTPATIENT
Start: 2019-10-18 | End: 2019-10-18 | Stop reason: HOSPADM

## 2019-10-18 RX ORDER — PROPOFOL 10 MG/ML
INJECTION, EMULSION INTRAVENOUS PRN
Status: DISCONTINUED | OUTPATIENT
Start: 2019-10-18 | End: 2019-10-18 | Stop reason: SDUPTHER

## 2019-10-18 RX ORDER — HYDROMORPHONE HCL 110MG/55ML
0.5 PATIENT CONTROLLED ANALGESIA SYRINGE INTRAVENOUS EVERY 5 MIN PRN
Status: DISCONTINUED | OUTPATIENT
Start: 2019-10-18 | End: 2019-10-18 | Stop reason: HOSPADM

## 2019-10-18 RX ORDER — MIDAZOLAM HYDROCHLORIDE 1 MG/ML
INJECTION INTRAMUSCULAR; INTRAVENOUS PRN
Status: DISCONTINUED | OUTPATIENT
Start: 2019-10-18 | End: 2019-10-18 | Stop reason: SDUPTHER

## 2019-10-18 RX ORDER — HYDROCODONE BITARTRATE AND ACETAMINOPHEN 5; 325 MG/1; MG/1
1 TABLET ORAL EVERY 6 HOURS PRN
Qty: 28 TABLET | Refills: 0 | Status: SHIPPED | OUTPATIENT
Start: 2019-10-18 | End: 2019-10-25

## 2019-10-18 RX ORDER — SODIUM CHLORIDE, SODIUM LACTATE, POTASSIUM CHLORIDE, CALCIUM CHLORIDE 600; 310; 30; 20 MG/100ML; MG/100ML; MG/100ML; MG/100ML
INJECTION, SOLUTION INTRAVENOUS CONTINUOUS
Status: DISCONTINUED | OUTPATIENT
Start: 2019-10-18 | End: 2019-10-18 | Stop reason: HOSPADM

## 2019-10-18 RX ORDER — PROPOFOL 10 MG/ML
INJECTION, EMULSION INTRAVENOUS CONTINUOUS PRN
Status: DISCONTINUED | OUTPATIENT
Start: 2019-10-18 | End: 2019-10-18 | Stop reason: SDUPTHER

## 2019-10-18 RX ORDER — OXYCODONE HYDROCHLORIDE AND ACETAMINOPHEN 5; 325 MG/1; MG/1
1 TABLET ORAL PRN
Status: DISCONTINUED | OUTPATIENT
Start: 2019-10-18 | End: 2019-10-18 | Stop reason: HOSPADM

## 2019-10-18 RX ORDER — FENTANYL CITRATE 50 UG/ML
INJECTION, SOLUTION INTRAMUSCULAR; INTRAVENOUS PRN
Status: DISCONTINUED | OUTPATIENT
Start: 2019-10-18 | End: 2019-10-18 | Stop reason: SDUPTHER

## 2019-10-18 RX ORDER — OXYCODONE HYDROCHLORIDE AND ACETAMINOPHEN 5; 325 MG/1; MG/1
2 TABLET ORAL PRN
Status: DISCONTINUED | OUTPATIENT
Start: 2019-10-18 | End: 2019-10-18 | Stop reason: HOSPADM

## 2019-10-18 RX ORDER — ONDANSETRON 2 MG/ML
4 INJECTION INTRAMUSCULAR; INTRAVENOUS
Status: DISCONTINUED | OUTPATIENT
Start: 2019-10-18 | End: 2019-10-18 | Stop reason: HOSPADM

## 2019-10-18 RX ORDER — KETAMINE HCL IN NACL, ISO-OSM 100MG/10ML
SYRINGE (ML) INJECTION PRN
Status: DISCONTINUED | OUTPATIENT
Start: 2019-10-18 | End: 2019-10-18 | Stop reason: SDUPTHER

## 2019-10-18 RX ORDER — FENTANYL CITRATE 50 UG/ML
25 INJECTION, SOLUTION INTRAMUSCULAR; INTRAVENOUS EVERY 5 MIN PRN
Status: DISCONTINUED | OUTPATIENT
Start: 2019-10-18 | End: 2019-10-18 | Stop reason: HOSPADM

## 2019-10-18 RX ORDER — CLINDAMYCIN PHOSPHATE 900 MG/50ML
900 INJECTION INTRAVENOUS ONCE
Status: COMPLETED | OUTPATIENT
Start: 2019-10-18 | End: 2019-10-18

## 2019-10-18 RX ORDER — PHENYLEPHRINE HCL IN 0.9% NACL 1 MG/10 ML
SYRINGE (ML) INTRAVENOUS PRN
Status: DISCONTINUED | OUTPATIENT
Start: 2019-10-18 | End: 2019-10-18 | Stop reason: SDUPTHER

## 2019-10-18 RX ORDER — LABETALOL HYDROCHLORIDE 5 MG/ML
5 INJECTION, SOLUTION INTRAVENOUS EVERY 10 MIN PRN
Status: DISCONTINUED | OUTPATIENT
Start: 2019-10-18 | End: 2019-10-18 | Stop reason: HOSPADM

## 2019-10-18 RX ORDER — LIDOCAINE HYDROCHLORIDE 20 MG/ML
INJECTION, SOLUTION EPIDURAL; INFILTRATION; INTRACAUDAL; PERINEURAL PRN
Status: DISCONTINUED | OUTPATIENT
Start: 2019-10-18 | End: 2019-10-18 | Stop reason: SDUPTHER

## 2019-10-18 RX ORDER — PROMETHAZINE HYDROCHLORIDE 25 MG/ML
6.25 INJECTION, SOLUTION INTRAMUSCULAR; INTRAVENOUS
Status: DISCONTINUED | OUTPATIENT
Start: 2019-10-18 | End: 2019-10-18 | Stop reason: HOSPADM

## 2019-10-18 RX ADMIN — FENTANYL CITRATE 25 MCG: 50 INJECTION, SOLUTION INTRAMUSCULAR; INTRAVENOUS at 09:02

## 2019-10-18 RX ADMIN — SODIUM CHLORIDE, POTASSIUM CHLORIDE, SODIUM LACTATE AND CALCIUM CHLORIDE: 600; 310; 30; 20 INJECTION, SOLUTION INTRAVENOUS at 06:49

## 2019-10-18 RX ADMIN — SODIUM CHLORIDE, POTASSIUM CHLORIDE, SODIUM LACTATE AND CALCIUM CHLORIDE: 600; 310; 30; 20 INJECTION, SOLUTION INTRAVENOUS at 07:27

## 2019-10-18 RX ADMIN — Medication 10 MG: at 07:36

## 2019-10-18 RX ADMIN — LIDOCAINE HYDROCHLORIDE 100 MG: 20 INJECTION, SOLUTION EPIDURAL; INFILTRATION; INTRACAUDAL; PERINEURAL at 07:36

## 2019-10-18 RX ADMIN — PROPOFOL 20 MG: 10 INJECTION, EMULSION INTRAVENOUS at 07:52

## 2019-10-18 RX ADMIN — PROPOFOL 30 MG: 10 INJECTION, EMULSION INTRAVENOUS at 07:37

## 2019-10-18 RX ADMIN — Medication 10 MG: at 07:52

## 2019-10-18 RX ADMIN — Medication 100 MCG: at 07:56

## 2019-10-18 RX ADMIN — CLINDAMYCIN PHOSPHATE 900 MG: 900 INJECTION, SOLUTION INTRAVENOUS at 07:48

## 2019-10-18 RX ADMIN — MIDAZOLAM 2 MG: 1 INJECTION INTRAMUSCULAR; INTRAVENOUS at 07:27

## 2019-10-18 RX ADMIN — Medication 25 MCG: at 07:36

## 2019-10-18 RX ADMIN — PROPOFOL 100 MCG/KG/MIN: 10 INJECTION, EMULSION INTRAVENOUS at 07:35

## 2019-10-18 RX ADMIN — Medication 25 MCG: at 07:52

## 2019-10-18 ASSESSMENT — PULMONARY FUNCTION TESTS
PIF_VALUE: 1
PIF_VALUE: 0
PIF_VALUE: 1
PIF_VALUE: 0
PIF_VALUE: 1
PIF_VALUE: 0
PIF_VALUE: 1
PIF_VALUE: 0
PIF_VALUE: 1
PIF_VALUE: 0
PIF_VALUE: 1
PIF_VALUE: 0
PIF_VALUE: 1
PIF_VALUE: 0
PIF_VALUE: 1
PIF_VALUE: 1
PIF_VALUE: 0
PIF_VALUE: 1
PIF_VALUE: 0
PIF_VALUE: 0
PIF_VALUE: 1
PIF_VALUE: 1
PIF_VALUE: 0
PIF_VALUE: 0
PIF_VALUE: 1
PIF_VALUE: 0
PIF_VALUE: 1
PIF_VALUE: 0
PIF_VALUE: 1
PIF_VALUE: 0
PIF_VALUE: 0
PIF_VALUE: 1
PIF_VALUE: 0
PIF_VALUE: 1
PIF_VALUE: 0
PIF_VALUE: 0
PIF_VALUE: 1
PIF_VALUE: 0
PIF_VALUE: 1
PIF_VALUE: 0
PIF_VALUE: 0
PIF_VALUE: 1
PIF_VALUE: 1

## 2019-10-18 ASSESSMENT — PAIN SCALES - GENERAL
PAINLEVEL_OUTOF10: 5
PAINLEVEL_OUTOF10: 0
PAINLEVEL_OUTOF10: 0
PAINLEVEL_OUTOF10: 9

## 2019-10-18 ASSESSMENT — PAIN - FUNCTIONAL ASSESSMENT: PAIN_FUNCTIONAL_ASSESSMENT: 0-10

## 2019-10-18 ASSESSMENT — PAIN DESCRIPTION - PAIN TYPE
TYPE: CHRONIC PAIN
TYPE: CHRONIC PAIN

## 2019-10-18 ASSESSMENT — PAIN DESCRIPTION - LOCATION: LOCATION: SHOULDER

## 2019-10-18 ASSESSMENT — PAIN DESCRIPTION - DESCRIPTORS: DESCRIPTORS: SORE

## 2019-10-23 ENCOUNTER — OFFICE VISIT (OUTPATIENT)
Dept: PODIATRY | Age: 54
End: 2019-10-23

## 2019-10-23 VITALS — WEIGHT: 243 LBS | BODY MASS INDEX: 40.48 KG/M2 | HEIGHT: 65 IN

## 2019-10-23 DIAGNOSIS — Z98.890 POST-OPERATIVE STATE: Primary | ICD-10-CM

## 2019-10-23 PROCEDURE — 99024 POSTOP FOLLOW-UP VISIT: CPT | Performed by: PODIATRIST

## 2019-10-23 RX ORDER — OXYCODONE HYDROCHLORIDE AND ACETAMINOPHEN 5; 325 MG/1; MG/1
1 TABLET ORAL EVERY 6 HOURS PRN
Qty: 28 TABLET | Refills: 0 | Status: SHIPPED | OUTPATIENT
Start: 2019-10-23 | End: 2019-10-30

## 2019-10-23 RX ORDER — OXYCODONE HYDROCHLORIDE AND ACETAMINOPHEN 5; 325 MG/1; MG/1
1 TABLET ORAL
COMMUNITY
Start: 2019-10-14

## 2019-10-23 RX ORDER — PIMECROLIMUS 10 MG/G
CREAM TOPICAL
COMMUNITY

## 2019-10-23 RX ORDER — FLUTICASONE PROPIONATE 50 MCG
SPRAY, SUSPENSION (ML) NASAL
COMMUNITY
Start: 2019-10-16

## 2019-10-23 RX ORDER — BUSPIRONE HYDROCHLORIDE 30 MG/1
TABLET ORAL
COMMUNITY
Start: 2019-09-20 | End: 2019-10-23 | Stop reason: SDUPTHER

## 2019-10-23 RX ORDER — BUDESONIDE AND FORMOTEROL FUMARATE DIHYDRATE 160; 4.5 UG/1; UG/1
2 AEROSOL RESPIRATORY (INHALATION)
COMMUNITY

## 2019-10-23 RX ORDER — NITROGLYCERIN 0.4 MG/1
TABLET SUBLINGUAL
COMMUNITY

## 2019-10-23 RX ORDER — ONDANSETRON 4 MG/1
TABLET, ORALLY DISINTEGRATING ORAL
COMMUNITY
Start: 2019-10-13

## 2019-10-23 RX ORDER — LAMOTRIGINE 150 MG/1
TABLET ORAL
Refills: 2 | COMMUNITY
Start: 2019-09-25 | End: 2021-11-24

## 2019-11-01 ENCOUNTER — TELEPHONE (OUTPATIENT)
Dept: INFECTIOUS DISEASES | Age: 54
End: 2019-11-01

## 2019-11-04 ENCOUNTER — OFFICE VISIT (OUTPATIENT)
Dept: PODIATRY | Age: 54
End: 2019-11-04

## 2019-11-04 VITALS — BODY MASS INDEX: 40.48 KG/M2 | WEIGHT: 243 LBS | HEIGHT: 65 IN

## 2019-11-04 DIAGNOSIS — Z98.890 POST-OPERATIVE STATE: Primary | ICD-10-CM

## 2019-11-04 PROCEDURE — 99024 POSTOP FOLLOW-UP VISIT: CPT | Performed by: PODIATRIST

## 2019-11-04 RX ORDER — AZELASTINE 1 MG/ML
SPRAY, METERED NASAL
Refills: 5 | COMMUNITY
Start: 2019-10-16

## 2019-11-12 ENCOUNTER — TELEPHONE (OUTPATIENT)
Dept: INFECTIOUS DISEASES | Age: 54
End: 2019-11-12

## 2019-11-12 DIAGNOSIS — B96.29 UTI DUE TO EXTENDED-SPECTRUM BETA LACTAMASE (ESBL) PRODUCING ESCHERICHIA COLI: ICD-10-CM

## 2019-11-12 DIAGNOSIS — Z16.12 UTI DUE TO EXTENDED-SPECTRUM BETA LACTAMASE (ESBL) PRODUCING ESCHERICHIA COLI: ICD-10-CM

## 2019-11-12 DIAGNOSIS — N39.0 UTI DUE TO EXTENDED-SPECTRUM BETA LACTAMASE (ESBL) PRODUCING ESCHERICHIA COLI: ICD-10-CM

## 2019-11-14 RX ORDER — GRANULES FOR ORAL 3 G/1
3 POWDER ORAL ONCE
Qty: 1 EACH | Refills: 1 | Status: SHIPPED | OUTPATIENT
Start: 2019-11-14 | End: 2019-11-14

## 2019-11-18 ENCOUNTER — OFFICE VISIT (OUTPATIENT)
Dept: PODIATRY | Age: 54
End: 2019-11-18

## 2019-11-18 VITALS — BODY MASS INDEX: 40.48 KG/M2 | HEIGHT: 65 IN | WEIGHT: 243 LBS

## 2019-11-18 DIAGNOSIS — Z98.890 POST-OPERATIVE STATE: Primary | ICD-10-CM

## 2019-11-18 PROCEDURE — 99024 POSTOP FOLLOW-UP VISIT: CPT | Performed by: PODIATRIST

## 2019-12-16 ENCOUNTER — OFFICE VISIT (OUTPATIENT)
Dept: PODIATRY | Age: 54
End: 2019-12-16

## 2019-12-16 VITALS — WEIGHT: 243 LBS | HEIGHT: 65 IN | BODY MASS INDEX: 40.48 KG/M2

## 2019-12-16 DIAGNOSIS — Z98.890 POST-OPERATIVE STATE: Primary | ICD-10-CM

## 2019-12-16 PROCEDURE — 99024 POSTOP FOLLOW-UP VISIT: CPT | Performed by: PODIATRIST

## 2020-11-25 ENCOUNTER — TELEPHONE (OUTPATIENT)
Dept: PODIATRY | Age: 55
End: 2020-11-25

## 2021-08-23 ENCOUNTER — OFFICE VISIT (OUTPATIENT)
Dept: PODIATRY | Age: 56
End: 2021-08-23
Payer: COMMERCIAL

## 2021-08-23 VITALS — WEIGHT: 243 LBS | BODY MASS INDEX: 40.48 KG/M2 | HEIGHT: 65 IN

## 2021-08-23 DIAGNOSIS — B35.1 DERMATOPHYTOSIS OF NAIL: ICD-10-CM

## 2021-08-23 DIAGNOSIS — M79.672 PAIN IN BOTH FEET: ICD-10-CM

## 2021-08-23 DIAGNOSIS — I73.9 PVD (PERIPHERAL VASCULAR DISEASE) (HCC): ICD-10-CM

## 2021-08-23 DIAGNOSIS — M21.612 BUNION, LEFT: Primary | ICD-10-CM

## 2021-08-23 DIAGNOSIS — M72.2 PLANTAR FASCIAL FIBROMATOSIS: ICD-10-CM

## 2021-08-23 DIAGNOSIS — M79.671 PAIN IN BOTH FEET: ICD-10-CM

## 2021-08-23 PROCEDURE — G8417 CALC BMI ABV UP PARAM F/U: HCPCS | Performed by: PODIATRIST

## 2021-08-23 PROCEDURE — L3020 FOOT LONGITUD/METATARSAL SUP: HCPCS | Performed by: PODIATRIST

## 2021-08-23 PROCEDURE — 3017F COLORECTAL CA SCREEN DOC REV: CPT | Performed by: PODIATRIST

## 2021-08-23 PROCEDURE — G8427 DOCREV CUR MEDS BY ELIG CLIN: HCPCS | Performed by: PODIATRIST

## 2021-08-23 PROCEDURE — 1036F TOBACCO NON-USER: CPT | Performed by: PODIATRIST

## 2021-08-23 PROCEDURE — 99214 OFFICE O/P EST MOD 30 MIN: CPT | Performed by: PODIATRIST

## 2021-08-23 NOTE — PROGRESS NOTES
[address] [facility] [address] [id number] [address]  Dept: [phone]    RETURN PATIENT PROGRESS NOTE  Date of patient's visit: 8/23/2021  Patient's Name:  Rodriguez Charles YOB: 1965            Patient Care Team:  Lindy Aguiar DO as PCP - General (Family Medicine)  Cory Leung DPM as Consulting Physician (Podiatry)  Danny Garcia MD as Consulting Physician (Infectious Diseases)       Rodriguez Charles 64 y.o. female that presents for follow-up of   Chief Complaint   Patient presents with    Foot Pain    Toe Pain     Pt's primary care physician is Lindy Aguiar DO last seen 8/2/2021  Symptoms began 1 year(s) ago and are unchanged . Patient relates pain is Present. Pain is rated 6 out of 10 and is described as moderate. Treatments prior to today's visit include: prevous bunion surgery from 10 years ago. States the bunion is causing the other toes to turn outward . Currently denies F/C/N/V. States she has pain during the first few steps in the morning. Pt states she also is out of fungal nail ointment    Allergies   Allergen Reactions    Adhesive Tape Rash     Irritates skin, tears skin.  Hylan G-F 20 Swelling and Other (See Comments)     Edema  Simvisc    Nsaids Other (See Comments)     Ulcers  Other reaction(s): GI Upset  Gastritis and ulcers  Ulcers      Xanax [Alprazolam] Other (See Comments)     Hyperactivity, jittery Other reaction(s):  Intolerance  hyperactivity    Amoxicillin      Other reaction(s): GI Disturbance    Augmentin [Amoxicillin-Pot Clavulanate] Nausea Only    Bactrim [Sulfamethoxazole-Trimethoprim]      Intolerance    Cleocin [Clindamycin Hcl] Photosensitivity    Erythromycin Nausea Only       Past Medical History:   Diagnosis Date    Acid reflux     Adenomatous polyp of stomach 10/01/2019    Anemia 4/13/2017    Anxiety 4/13/2017    Anxiety disorder     Arthritis     Arthritis of right  Pain in pelvis 9/20/2017    PONV (postoperative nausea and vomiting) 3/27/2018    Primary osteoarthritis of right knee 7/19/2017    Prolonged emergence from general anesthesia 3/27/2018    Recurrent urinary tract infection 8/17/2017    Restless leg 8/24/2017    Right knee pain 1/10/2017    Sleep apnea     Sleep apnea with use of continuous positive airway pressure (CPAP)     Status post arthroscopy of shoulder 1/2/2018    Stress incontinence 01/27/2017    In past no longer    Superior glenoid labrum lesion of shoulder 1/27/2017    Thrombocytopenic disorder ([facility] 4/13/2017    Urinary urgency 02/20/2015    Only with UTI per pt.  UTI due to extended-spectrum beta lactamase (ESBL) producing Escherichia coli 12/18/2017       Prior to Admission medications    Medication Sig Start Date End Date Taking? Authorizing Provider   ciclopirox (PENLAC) 8 % solution Apply topically nightly. Remove once weekly with alcohol or nail polish remover.  8/23/21  Yes Juaquin Bedoya DPM   azelastine (ASTELIN) 0.1 % nasal spray USE 1 SPRAY into each nostril TWO TIMES A DAY 10/16/19  Yes Historical Provider, MD   fluticasone (FLONASE) 50 MCG/ACT nasal spray  10/16/19  Yes Historical Provider, MD   lamoTRIgine (LAMICTAL) 150 MG tablet TAKE 1 TABLET BY MOUTH AT BEDTIME 9/25/19  Yes Historical Provider, MD   nitroGLYCERIN (NITROSTAT) 0.4 MG SL tablet nitroglycerin 0.4 mg sublingual tablet   Yes Historical Provider, MD   ondansetron (ZOFRAN-ODT) 4 MG disintegrating tablet  10/13/19  Yes Historical Provider, MD   oxyCODONE-acetaminophen (PERCOCET) 5-325 MG per tablet Take 1 tablet by mouth. 10/14/19  Yes Historical Provider, MD   pimecrolimus (ELIDEL) 1 % cream pimecrolimus 1 % topical cream   Yes Historical Provider, MD   budesonide-formoterol (SYMBICORT) 160-4.5 MCG/ACT AERO Inhale 2 puffs into the lungs   Yes Historical Provider, MD   XANTHAN GUM PO Take by mouth nightly   Yes Historical Provider, MD   sucralfate (CARAFATE) 1 GM tablet Take 1 g by mouth three times daily   Yes Historical Provider, MD   Immune Globulin, Human, (HIZENTRA) 10 GM/50ML SOLN Inject into the skin once a week   Yes Historical Provider, MD   pantoprazole (PROTONIX) 40 MG tablet Take 40 mg by mouth 2/14/19  Yes Historical Provider, MD   Lutein-Zeaxanthin 25-5 MG CAPS lutein-zeaxanthin 25 mg-5 mg capsule   Take 1 capsule every day by oral route. Yes Historical Provider, MD   LORazepam (ATIVAN) 0.5 MG tablet lorazepam 0.5 mg tablet   Take 1 tablet twice a day by oral route as needed for 30 days. 2/6/19  Yes Historical Provider, MD   TRINTELLIX 20 MG TABS tablet TAKE 1 TABLET BY MOUTH ONE TIME A DAY 6/5/18  Yes Historical Provider, MD   busPIRone (BUSPAR) 15 MG tablet TAKE 1 TABLET BY MOUTH TWO TIMES A DAY 6/4/18  Yes Historical Provider, MD   rOPINIRole (REQUIP) 0.5 MG tablet Take 0.5 mg by mouth 2 times daily  10/26/17  Yes Historical Provider, MD   albuterol sulfate HFA (PROAIR HFA) 108 (90 BASE) MCG/ACT inhaler Inhale 2 puffs into the lungs every 6 hours as needed for Wheezing   Yes Historical Provider, MD   butalbital-acetaminophen-caffeine (FIORICET, ESGIC) -40 MG per tablet Take 1 tablet by mouth as needed for Headaches  1/6/15  Yes Historical Provider, MD   levothyroxine (SYNTHROID) 100 MCG tablet Take 125 mcg by mouth daily  2/9/15  Yes Historical Provider, MD   ondansetron (ZOFRAN) 4 MG tablet Take 4 mg by mouth every 8 hours as needed for Nausea  1/28/15  Yes Historical Provider, MD       Review of Systems    Review of Systems:  History obtained from chart review and the patient  General ROS: negative for - chills, fatigue, fever, night sweats or weight gain  Constitutional: Negative for chills, diaphoresis, fatigue, fever and unexpected weight change. Musculoskeletal: Positive for arthralgias, gait problem and joint swelling. Neurological ROS: negative for - behavioral changes, confusion, headaches or seizures.  Negative for weakness and numbness. Dermatological ROS: negative for - mole changes, rash  Cardiovascular: Negative for leg swelling. Gastrointestinal: Negative for constipation, diarrhea, nausea and vomiting. Lower Extremity Physical Examination:     Vitals: There were no vitals filed for this visit. General: AAO x 3 in NAD. Dermatologic Exam:  Skin lesion/ulceration Absent . Skin No rashes or nodules noted. .       Musculoskeletal:     1st MPJ ROM decreased, Bilateral.  Muscle strength 5/5, Bilateral.         POP to the left bunion to the  Medial eminence and to the dorsal prominence POP of the right and left plantar medial calcaneal tuberosity. Pain increased with Dorsiflexion of the right and left lesser toes. Negative pain on compression of the calcaneus bilaterally Medial longitudinal arch, Bilateral WNL. Ankle ROM WNL,Bilateral.    Dorsally contracted digits absent digits 1-5 Bilateral.     Vascular: DP and PT pulses palpable 2/4, Bilateral.  CFT <3 seconds, Bilateral.  Hair growth present to the level of the digits, Bilateral.  Edema absent, Bilateral.  Varicosities absent, Bilateral. Erythema absent, Bilateral    Neurological: Sensation intact to light touch to level of digits, Bilateral.  Protective sensation intact 10/10 sites via 5.07/10g Lawai-Darryl Monofilament, Bilateral.  negative Tinel's, Bilateral.  negative Valleix sign, Bilateral.      Integument: Warm, dry, supple, Bilateral.  Open lesion absent, Bilateral.  Interdigital maceration absent to web spaces 1-4, Bilateral.  Nails 1-5 left and 1-5 right thickened > 3.0 mm, dystrophic and crumbly, discolored with yellow subungual debris    Fissures absent, Bilateral.       Xrays left foot 3 views:  Slight DJD of the 1st MTPJ with dorsal medial prominence. There is lateral deviation of the hallux but not an increase in IM angle        Asessment: Patient is a 64 y.o. female with:    Diagnosis Orders   1. Bunion, left  XR FOOT LEFT (MIN 3 VIEWS)   2. Dermatophytosis of nail  ciclopirox (PENLAC) 8 % solution   3. PVD (peripheral vascular disease) ([address]     4. Plantar fascial fibromatosis  DE FOOT LONGITUD/METATARSAL SUP   5. Pain in both feet  DE FOOT LONGITUD/METATARSAL SUP       Plan: Patient examined and evaluated. Current condition and treatment options discussed in detail. Advised pt to her conditon and the x ray findings of the left foot. Patient was casted for custom molded orthotics today. I feel that these appliances are medically necessary for my patients well being and in my opinion are both reasonable and necessary to the accepted medical practice in the treatment of the above conditions. Custom molded orthotics prevent the over pronation which is leading to excessive tension of the plantar fascia. Abnormal foot/leg functions demands mechanical, functional protections and control. Without custom molded orthotics, the patient may develop chronic pain in her feet. Later on in life, the patient may develop ankle, shin, knee and hip pain as well. In trial usage of felt pads with Lo-Dye ankle strapping to mimic the effects of the orthotics, the patient responded with reduced symptoms and better foot function. Description of the devices: These devices are Root biomechanical orthotic devices made of a plastic polymer with a leather or Spenco-type top cover. These appliances control biomechanical aberrations of the patients feet and accommodate painful areas. Orthotics are not intended to be worn in lieu of shoes, but are removable devices formed from casts of the patients feet and then sent to an independent laboratory for fabrication. Due to the nature of my patients condition, I feel that this therapy is necessary indefinitely, as this is a form of continuous therapy. This is NOT a convenience item. It is my opinion that these devices will prevent the need for costly hospital surgery, further pain and discomfort.  The total fee has been itemized to include biomechanical examination, casting of the feet and actual fabrication by the outside laboratory. All labs were reviewed and all imagining including the above findings were reviewed prior to the patients arrival and with the patient today      Time was spent educating the patient and their families/caregivers on proper care of the feet and ankles. All the above diagnosis were addressed at todays visit and all questions were answered. I had a long discussion today with the patient about the likely diagnosis and its natural history, physical exam and imaging findings, as well as treatment options. We discussed both surgical and nonsurgical treatments, including risks and benefits. From a nonoperative standpoint, we discussed rest/activity modification, NSAIDs/Acetaminophen/topical anesthetics, orthotics, bracing/immobilization, and physical therapy. Surgically, we discussed modifed forbes bunionectomy with akin osteotomy with staple fixation       I discussed in detail the procedure. He/She was in full agreement and understood risks. The reason for surgery is due to unsuccessful non-operative treatment and/or conservative treatment is not a viable option. It was discussed with the patient that compliance postoperatively is of utmost importance. Any deviation on behalf of the patient will decrease the chances of a successful outcome. Patient acknowledged, understands, and would like to move forward with surgery as discussed. The patient was given a consent outlining the general risk of surgery as well as the specifics to the surgical plan. This was carefully discussed giving all options, indications and contraindications regarding the procedure outlined in the consent. All questions were answered to the patient's satisfaction. The patient signed the consent form confirming complete understanding and acceptance of the risks of stated.  I specifically stated and inquired AM  8/23/2021

## 2021-08-23 NOTE — PROGRESS NOTES
Having issues with toes and foot pain for a year now. Patient is having an issue with fungus as well and toe placement.

## 2021-09-15 ENCOUNTER — TELEPHONE (OUTPATIENT)
Dept: PODIATRY | Age: 56
End: 2021-09-15

## 2021-09-15 ENCOUNTER — NURSE TRIAGE (OUTPATIENT)
Dept: OTHER | Facility: CLINIC | Age: 56
End: 2021-09-15

## 2021-09-15 NOTE — TELEPHONE ENCOUNTER
Received call from Shaun Xavier at [facility] with [employer] Complaint. Brief description of triage: left toe Injury    Triage indicates for patient to see today    Care advice provided, patient verbalizes understanding; denies any other questions or concerns; instructed to call back for any new or worsening symptoms. Writer provided warm transfer to Charisma Rosa at [facility] for appointment scheduling. Attention Provider: Thank you for allowing me to participate in the care of your patient. The patient was connected to triage in response to information provided to the [facility]. Please do not respond through this encounter as the response is not directed to a shared pool. Reason for Disposition   SEVERE pain (e.g., excruciating)    Answer Assessment - Initial Assessment Questions  1. MECHANISM: \"How did the injury happen? \"       Jonny Jacques a computer table accidentally    2. ONSET: \"When did the injury happen? \" (Minutes or hours ago)       9/12/21    3. LOCATION: \"What part of the toe is injured? \" \"Is the nail damaged? \"       Toe, side of foot, fifth toe left foot    4. APPEARANCE of TOE INJURY: \"What does the injury look like? \"       Swollen, the more she's on it the more is swells. 5. SEVERITY: \"Can you use the foot normally? \" \"Can you walk? \"       Walking causes pain, can't walk normally. 6. SIZE: For cuts, bruises, or swelling, ask: \"How large is it? \" (e.g., inches or centimeters;  entire toe)       No bruising, base of toe to side of foot swollen    7. PAIN: \"Is there pain? \" If so, ask: \"How bad is the pain? \"   (e.g., Scale 1-10; or mild, moderate, severe)      8/10 all the time    8. TETANUS: For any breaks in the skin, ask: \"When was the last tetanus booster? \"      N/a    9. DIABETES: \"Do you have a history of diabetes or poor circulation in the feet? \"      Denies    10. OTHER SYMPTOMS: \"Do you have any other symptoms? \"         Denies, (had some numbness last night but she attributes that to

## 2021-09-15 NOTE — TELEPHONE ENCOUNTER
Called patient regarding toe injury.  Appointment scheduled for 9/16/21 at [facility] office with Dr. Tj Santos

## 2021-10-08 ENCOUNTER — TELEPHONE (OUTPATIENT)
Dept: ADMINISTRATIVE | Age: 56
End: 2021-10-08

## 2021-11-24 ENCOUNTER — PROCEDURE VISIT (OUTPATIENT)
Dept: PODIATRY | Age: 56
End: 2021-11-24
Payer: COMMERCIAL

## 2021-11-24 VITALS — HEIGHT: 65 IN | RESPIRATION RATE: 18 BRPM | BODY MASS INDEX: 40.48 KG/M2 | WEIGHT: 243 LBS

## 2021-11-24 DIAGNOSIS — M79.672 LEFT FOOT PAIN: ICD-10-CM

## 2021-11-24 DIAGNOSIS — L03.032 CELLULITIS OF THIRD TOE OF LEFT FOOT: ICD-10-CM

## 2021-11-24 DIAGNOSIS — L60.0 INGROWING NAIL: Primary | ICD-10-CM

## 2021-11-24 PROCEDURE — G8417 CALC BMI ABV UP PARAM F/U: HCPCS | Performed by: PODIATRIST

## 2021-11-24 PROCEDURE — G8427 DOCREV CUR MEDS BY ELIG CLIN: HCPCS | Performed by: PODIATRIST

## 2021-11-24 PROCEDURE — 3017F COLORECTAL CA SCREEN DOC REV: CPT | Performed by: PODIATRIST

## 2021-11-24 PROCEDURE — 99214 OFFICE O/P EST MOD 30 MIN: CPT | Performed by: PODIATRIST

## 2021-11-24 PROCEDURE — 11750 EXCISION NAIL&NAIL MATRIX: CPT | Performed by: PODIATRIST

## 2021-11-24 PROCEDURE — 1036F TOBACCO NON-USER: CPT | Performed by: PODIATRIST

## 2021-11-24 PROCEDURE — G8484 FLU IMMUNIZE NO ADMIN: HCPCS | Performed by: PODIATRIST

## 2021-11-24 RX ORDER — LAMOTRIGINE 200 MG/1
TABLET ORAL
COMMUNITY
Start: 2021-11-06

## 2021-11-24 RX ORDER — LAMOTRIGINE 25 MG/1
TABLET ORAL
COMMUNITY
Start: 2021-09-15

## 2021-11-24 RX ORDER — GABAPENTIN 300 MG/1
300 CAPSULE ORAL
COMMUNITY
Start: 2021-07-21

## 2021-11-24 RX ORDER — FLUTICASONE PROPIONATE 93 UG/1
SPRAY, METERED NASAL
COMMUNITY
Start: 2021-09-08

## 2021-11-24 RX ORDER — BENZONATATE 200 MG/1
200 CAPSULE ORAL 3 TIMES DAILY PRN
COMMUNITY
Start: 2021-11-23

## 2021-11-24 RX ORDER — METHYLPREDNISOLONE 4 MG/1
TABLET ORAL
COMMUNITY
Start: 2021-11-23

## 2021-11-24 RX ORDER — BUPROPION HYDROCHLORIDE 150 MG/1
TABLET ORAL
COMMUNITY
Start: 2021-10-04

## 2021-11-24 NOTE — PROGRESS NOTES
30 [facility] [address] [id number] [address]  Dept: [phone]    RETURN PATIENT PROGRESS NOTE  Date of patient's visit: 11/24/2021  Patient's Name:  Paulette Jefferson YOB: 1965            Patient Care Team:  Mercedez Willis DO as PCP - General (Family Medicine)  Cuate Mchugh DPM as Consulting Physician (Podiatry)  Yuliya Jones MD as Consulting Physician (Infectious Diseases)       Paulette Jefferson 64 y.o. female that presents for follow-up of   Chief Complaint   Patient presents with    Ingrown Toenail     left 3rd toe     Pt's primary care physician is Mercedez Willis DO last seen 11/23/2021  Symptoms began 4 month(s) ago and are decreased . Patient relates pain is Present. Pain is rated 2 out of 10 and is described as moderate. Treatments prior to today's visit include: trying to cut the cornersbut she cannot get back there and it is now red. Currently denies F/C/N/V. Allergies   Allergen Reactions    Adhesive Tape Rash     Irritates skin, tears skin.  Hylan G-F 20 Swelling and Other (See Comments)     Edema  Simvisc    Nsaids Other (See Comments)     Ulcers  Other reaction(s): GI Upset  Gastritis and ulcers  Ulcers      Xanax [Alprazolam] Other (See Comments)     Hyperactivity, jittery Other reaction(s):  Intolerance  hyperactivity    Amoxicillin      Other reaction(s): GI Disturbance    Augmentin [Amoxicillin-Pot Clavulanate] Nausea Only    Bactrim [Sulfamethoxazole-Trimethoprim]      Intolerance    Cleocin [Clindamycin Hcl] Photosensitivity    Erythromycin Nausea Only    Primidone      Other reaction(s): Dizziness    Cyanoacrylate Rash     Surgical glue - red burning itching  Surgical glue - red burning itching         Past Medical History:   Diagnosis Date    Acid reflux     Adenomatous polyp of stomach 10/01/2019    Anemia 4/13/2017    Anxiety 4/13/2017    Anxiety disorder     Arthritis     Arthritis of right acromioclavicular joint 5/20/2019    Atopic rhinitis 1/27/2017    Benign paroxysmal positional vertigo 01/27/2017    3-4 years ago (Written 10/14/2019)    Bronchitis     With sinus infections    Carpal tunnel syndrome 1/27/2017    Chondromalacia 1/27/2017    Chondromalacia of right patellofemoral joint 2/21/2017    Chronic midline low back pain without sciatica 3/14/2017    Chronic pain disorder 4/13/2017    Chronic sinusitis 01/27/2017    In past no longer per pt.  Common variable agammaglobulinemia ([address] 9/20/2017    CVID (common variable immunodeficiency) ([facility]     Pt follows-up with Dr. Janie Goldsmith (Written 10/14/2019).  DDD (degenerative disc disease), lumbar 3/14/2017    Depression     Elevation of level of transaminase or lactic acid dehydrogenase (LDH) 1/27/2017    Fever 1/17/2019    Flank pain 2/25/2018    Gastroesophageal reflux disease 1/27/2017    Hammer toe 1/27/2017    History of asthma 1/27/2017    History of knee replacement procedure of right knee 1/10/2017    History of pneumonia     3-4 years ago (Written 10/14/2019)    History of suburethral sling procedure 12/18/2017    Hx of blood clots 2017    When patient had a PIC line left chest. No longer on blood thinners per pt.  Hypothyroidism     Iron deficiency anemia 01/27/2017    No longer per pt.     Knee pain 4/13/2017    LBBB (left bundle branch block) 05/2019    Lumbosacral radiculitis 4/13/2017    Menopause present 12/10/2018    Menorrhagia 1/27/2017    Migraine headache     Mixed anxiety depressive disorder 1/27/2017    Moderate persistent asthma 1/27/2017    Morbid obesity ([address] 1/27/2017    Morbid obesity with BMI of 40.0-44.9, adult ([address] 12/4/2017    Narcolepsy     Occurred several years (Written 68/50/3862)    Nonalcoholic fatty liver disease 1/27/2017    Obesity (BMI 30-39.9) 1/10/2017    Pain due to knee joint prosthesis ([address] 4/13/2017    Pain due to total right knee replacement ([facility] 4/13/2017    Pain in pelvis 9/20/2017    PONV (postoperative nausea and vomiting) 3/27/2018    Primary osteoarthritis of right knee 7/19/2017    Prolonged emergence from general anesthesia 3/27/2018    Recurrent urinary tract infection 8/17/2017    Restless leg 8/24/2017    Right knee pain 1/10/2017    Sleep apnea     Sleep apnea with use of continuous positive airway pressure (CPAP)     Status post arthroscopy of shoulder 1/2/2018    Stress incontinence 01/27/2017    In past no longer    Superior glenoid labrum lesion of shoulder 1/27/2017    Thrombocytopenic disorder ([facility] 4/13/2017    Urinary urgency 02/20/2015    Only with UTI per pt.  UTI due to extended-spectrum beta lactamase (ESBL) producing Escherichia coli 12/18/2017       Prior to Admission medications    Medication Sig Start Date End Date Taking?  Authorizing Provider   benzonatate (TESSALON) 200 MG capsule Take 200 mg by mouth 3 times daily as needed 11/23/21  Yes Historical Provider, MD   buPROPion (WELLBUTRIN XL) 150 MG extended release tablet  10/4/21  Yes Historical Provider, MD   vitamin D 25 MCG (1000 UT) CAPS Take 1,000 Units by mouth daily   Yes Historical Provider, MD   cyanocobalamin 1000 MCG tablet Take 1,000 mcg by mouth daily   Yes Historical Provider, MD   esketamine (SPRAVATO, 56 MG DOSE,) 28 MG/DEVICE nasal solution Twice a Week 11/22/21  Yes Historical Provider, MD   gabapentin (NEURONTIN) 300 MG capsule 300 mg. 7/21/21  Yes Historical Provider, MD   lamoTRIgine (LAMICTAL) 200 MG tablet TAKE 1 TABLET BY MOUTH EVERY DAY AT BEDTIME 11/6/21  Yes Historical Provider, MD   lamoTRIgine (LAMICTAL) 25 MG tablet TAKE 1 TABLET BY MOUTH IN THE MORNING 9/15/21  Yes Historical Provider, MD   methylPREDNISolone (MEDROL DOSEPACK) 4 MG tablet  11/23/21  Yes Historical Provider, MD Anjum Gutierrez 93 MCG/ACT [place]  9/8/21  Yes Historical Provider, MD   pimecrolimus (ELIDEL) 1 % cream pimecrolimus 1 % topical cream   Yes Historical Provider, MD   XANTHAN GUM PO Take by mouth nightly   Yes Historical Provider, MD   pantoprazole (PROTONIX) 40 MG tablet Take 40 mg by mouth 2/14/19  Yes Historical Provider, MD   LORazepam (ATIVAN) 0.5 MG tablet lorazepam 0.5 mg tablet   Take 1 tablet twice a day by oral route as needed for 30 days. 2/6/19  Yes Historical Provider, MD   TRINTELLIX 20 MG TABS tablet TAKE 1 TABLET BY MOUTH ONE TIME A DAY 6/5/18  Yes Historical Provider, MD   busPIRone (BUSPAR) 15 MG tablet TAKE 1 TABLET BY MOUTH TWO TIMES A DAY 6/4/18  Yes Historical Provider, MD   albuterol sulfate HFA (PROAIR HFA) 108 (90 BASE) MCG/ACT inhaler Inhale 2 puffs into the lungs every 6 hours as needed for Wheezing   Yes Historical Provider, MD   butalbital-acetaminophen-caffeine (FIORICET, ESGIC) -40 MG per tablet Take 1 tablet by mouth as needed for Headaches  1/6/15  Yes Historical Provider, MD   levothyroxine (SYNTHROID) 100 MCG tablet Take 125 mcg by mouth daily  2/9/15  Yes Historical Provider, MD   ondansetron (ZOFRAN) 4 MG tablet Take 4 mg by mouth every 8 hours as needed for Nausea  1/28/15  Yes Historical Provider, MD   ciclopirox (PENLAC) 8 % solution Apply topically nightly. Remove once weekly with alcohol or nail polish remover. Patient not taking: Reported on 11/24/2021 8/23/21   Mark Kimble DPM   azelastine (ASTELIN) 0.1 % nasal spray USE 1 SPRAY into each nostril TWO TIMES A DAY  Patient not taking: Reported on 11/24/2021 10/16/19   Historical Provider, MD   fluticasone [facility]) 50 MCG/ACT nasal spray  10/16/19   Historical Provider, MD   nitroGLYCERIN (NITROSTAT) 0.4 MG SL tablet nitroglycerin 0.4 mg sublingual tablet  Patient not taking: Reported on 11/24/2021    Historical Provider, MD   ondansetron (ZOFRAN-ODT) 4 MG disintegrating tablet  10/13/19   Historical Provider, MD   oxyCODONE-acetaminophen (PERCOCET) 5-325 MG per tablet Take 1 tablet by mouth.   Patient not taking: Reported on 11/24/2021 10/14/19 Historical Provider, MD   budesonide-formoterol (SYMBICORT) 160-4.5 MCG/ACT AERO Inhale 2 puffs into the lungs  Patient not taking: Reported on 11/24/2021    Historical Provider, MD   sucralfate (CARAFATE) 1 GM tablet Take 1 g by mouth three times daily  Patient not taking: Reported on 11/24/2021    Historical Provider, MD   Immune Globulin, Human, (HIZENTRA) 10 GM/50ML SOLN Inject into the skin once a week  Patient not taking: Reported on 11/24/2021    Historical Provider, MD   Lutein-Zeaxanthin 25-5 MG CAPS lutein-zeaxanthin 25 mg-5 mg capsule   Take 1 capsule every day by oral route. Patient not taking: Reported on 11/24/2021    Historical Provider, MD   rOPINIRole (REQUIP) 0.5 MG tablet Take 0.5 mg by mouth 2 times daily   Patient not taking: Reported on 11/24/2021 10/26/17   Historical Provider, MD       Review of Systems    Review of Systems:  History obtained from chart review and the patient  General ROS: negative for - chills, fatigue, fever, night sweats or weight gain  Constitutional: Negative for chills, diaphoresis, fatigue, fever and unexpected weight change. Musculoskeletal: Positive for arthralgias, gait problem and joint swelling. Neurological ROS: negative for - behavioral changes, confusion, headaches or seizures. Negative for weakness and numbness. Dermatological ROS: negative for - mole changes, rash  Cardiovascular: Negative for leg swelling. Gastrointestinal: Negative for constipation, diarrhea, nausea and vomiting. Lower Extremity Physical Examination:     Vitals:   Vitals:    11/24/21 0957   Resp: 18     General: AAO x 3 in NAD. Dermatologic Exam:  Skin lesion/ulceration Absent . Skin No rashes or nodules noted. .       Musculoskeletal:     1st MPJ ROM decreased, Bilateral.  Muscle strength 5/5, Bilateral.  Pain present upon palpation of left 3rd toe with erythema and pain to the medial and lateral edges of the nail bed. No purulence seen .   Medial longitudinal arch, Bilateral WNL. Ankle ROM WNL,Bilateral.    Dorsally contracted digits absent digits 1-5 Bilateral.     Vascular: DP and PT pulses palpable 2/4, Bilateral.  CFT <3 seconds, Bilateral.  Hair growth present to the level of the digits, Bilateral.  Edema absent, Bilateral.  Varicosities absent, Bilateral. Erythema absent, Bilateral    Neurological: Sensation intact to light touch to level of digits, Bilateral.  Protective sensation intact 10/10 sites via 5.07/10g [place]-Darryl Monofilament, Bilateral.  negative Tinel's, Bilateral.  negative Valleix sign, Bilateral.      Integument: Warm, dry, supple, Bilateral.  Open lesion absent, Bilateral.  Interdigital maceration absent to web spaces 1-4, Bilateral.  Nails are normal in length, thickness and color 1-5 bilateral.  Fissures absent, Bilateral.     Asessment: Patient is a 64 y.o. female with:    Diagnosis Orders   1. Ingrowing nail  44331 - [place] REMOVAL OF NAIL BED   2. Left foot pain  55168 - [place] REMOVAL OF NAIL BED   3. Cellulitis of third toe of left foot  33607 - [place] REMOVAL OF NAIL BED         Plan: Patient examined and evaluated. Current condition and treatment options discussed in detail. Advised pt to her ocnditon      Verbal consent obtained for chemical matrixectomy after all questions answered. Local anesthesia obtained via Hallux block to the Left 3rd toe utilizing 5 cc of 1% Lidocaine plain. Next the Left 3rd toe was prepped with Betadine. A digital tourniquet was applied. A freer elevator was used to free the entire nail border. An english anvil was used to remove the offending border in total.  A curette was used to ensure the offending border was free of any remaining nail. Next, three 30 second applications of 29% Phenol were applied to the offending nail border followed by an isopropyl alcohol flush. Triple antibiotic ointment was applied to the nail border followed by a semi-compressive dressing.       The patient was instructed to leave this dressing clean/dry/intact until the following am at which point they will begin warm epsom salt soaks followed by application of antibiotic ointment and Band-Aid BID. Patient instructed to take Tylenol PRN pain. Post-operative chemical matrixectomy instruction sheet dispensed to patient. .  Verbal and written instructions given to patient. Contact office with any questions/problems/concerns. No orders of the defined types were placed in this encounter. No orders of the defined types were placed in this encounter. RTC in 2week(s)      All labs were reviewed and all imagining including the above findings were reviewed PRIOR to the patients arrival and with the patient today. Previous patient encounter was reviewed. Encounters from the patients other medical providers were reviewed and noted. Time was spent educating the patient and their families/caregivers on proper care of the feet and ankles. All the above diagnosis were addressed at todays visit and all questions were answered. A total of 35 minutes was spent with this patients encounter which included charting after the patients visit  .     11/24/2021      Electronically signed by Ruba Peña DPM on 11/24/2021 at 10:03 AM  11/24/2021

## 2021-12-08 ENCOUNTER — OFFICE VISIT (OUTPATIENT)
Dept: PODIATRY | Age: 56
End: 2021-12-08

## 2021-12-08 VITALS — HEIGHT: 65 IN | WEIGHT: 243 LBS | BODY MASS INDEX: 40.48 KG/M2

## 2021-12-08 DIAGNOSIS — Z98.890 POST-OPERATIVE STATE: Primary | ICD-10-CM

## 2021-12-08 PROCEDURE — 99024 POSTOP FOLLOW-UP VISIT: CPT | Performed by: PODIATRIST

## 2021-12-08 NOTE — PROGRESS NOTES
30 [facility] [address] [address]  Dept: [phone]    POST-OP PROGRESS NOTE  Date of patient's visit: 12/8/2021  Patient's Name:  Chris Monreal YOB: 1965            Patient Care Team:  Kel Mendoza DO as PCP - General (Family Medicine)  Kari Rios DPM as Consulting Physician (Podiatry)  Mame Calles MD as Consulting Physician (Infectious Diseases)        Chief Complaint   Patient presents with    Post-Op Check       Pt's primary care physician is Kel Mendoza DO last seen November 23 2021     Subjective: Chris Monreal is a 64 y.o. female who presents to the office today 2week(s)  S/P chemical matrixectomy left third toe for correction of ingrown toenail  Problem List Items Addressed This Visit     None      Visit Diagnoses     Post-operative state    -  Primary      . Patient relates pain is Absent  and IMPROVED. Pain is rated 0 out of 10 and is described as none. Currently denies F/C/N/V. Is patient taking pain medications as prescribed and is controlling pain    Physical Examination:  Dried serous fluid to the affected nail border. .  No purulence. Nail is free from the skin edge. No signs of infection. Radiographs: none      Assessment: Soni Bledsoe is status post chemical matrixectomy left third toe  Normal post operative course. Doing well          ICD-10-CM    1. Post-operative state  Z98.890          Plan:  Patient examined and evaluated. Current condition and treatment options discussed in detail. Advised pt to her condition  Advised pt to soak for one more week. Keep bandaid on during the day and remove at night for one more week. .  Verbal and written instructions given to patient. Orders: No orders of the defined types were placed in this encounter. Contact office with any questions/problems/concerns. RTC in PRN.      Electronically signed by Kari Rios DPM on 12/8/2021 at 9:27 AM  12/8/2021

## 2022-02-28 ENCOUNTER — OFFICE VISIT (OUTPATIENT)
Dept: PODIATRY | Age: 57
End: 2022-02-28
Payer: COMMERCIAL

## 2022-02-28 VITALS — BODY MASS INDEX: 40.48 KG/M2 | WEIGHT: 243 LBS | HEIGHT: 65 IN

## 2022-02-28 DIAGNOSIS — M79.671 RIGHT FOOT PAIN: Primary | ICD-10-CM

## 2022-02-28 PROCEDURE — G8427 DOCREV CUR MEDS BY ELIG CLIN: HCPCS | Performed by: PODIATRIST

## 2022-02-28 PROCEDURE — G8484 FLU IMMUNIZE NO ADMIN: HCPCS | Performed by: PODIATRIST

## 2022-02-28 PROCEDURE — G8417 CALC BMI ABV UP PARAM F/U: HCPCS | Performed by: PODIATRIST

## 2022-02-28 PROCEDURE — 1036F TOBACCO NON-USER: CPT | Performed by: PODIATRIST

## 2022-02-28 PROCEDURE — 3017F COLORECTAL CA SCREEN DOC REV: CPT | Performed by: PODIATRIST

## 2022-02-28 PROCEDURE — 99213 OFFICE O/P EST LOW 20 MIN: CPT | Performed by: PODIATRIST

## 2022-02-28 NOTE — PROGRESS NOTES
[address]  Dept: [phone]    RETURN PATIENT PROGRESS NOTE  Date of patient's visit: 2/28/2022  Patient's Name:  Patience Lezama YOB: 1965            Patient Care Team:  Denver Gilliam DO as PCP - General (Family Medicine)  Indu Ayers DPM as Consulting Physician (Podiatry)  Aneesh Mustafa MD as Consulting Physician (Infectious Diseases)       Patience Lezama 64 y.o. female that presents for follow-up of   Chief Complaint   Patient presents with    Foot Pain     right foot stress fracture     Pt's primary care physician is Denver Gilliam DO last seen January 6 2022  Symptoms began 1 week(s) ago and are increased . Patient relates pain is Present. Pain is rated 4 out of 10 and is described as intermittent. Treatments prior to today's visit include: previous podiatry treatment. She has been walking on her foot a lot and has had some swelling  Currently denies F/C/N/V. Allergies   Allergen Reactions    Adhesive Tape Rash     Irritates skin, tears skin.  Hylan G-F 20 Swelling and Other (See Comments)     Edema  Simvisc    Nsaids Other (See Comments)     Ulcers  Other reaction(s): GI Upset  Gastritis and ulcers  Ulcers      Xanax [Alprazolam] Other (See Comments)     Hyperactivity, jittery Other reaction(s):  Intolerance  hyperactivity    Amoxicillin      Other reaction(s): GI Disturbance    Augmentin [Amoxicillin-Pot Clavulanate] Nausea Only    Bactrim [Sulfamethoxazole-Trimethoprim]      Intolerance    Cleocin [Clindamycin Hcl] Photosensitivity    Erythromycin Nausea Only    Primidone      Other reaction(s): Dizziness    Cyanoacrylate Rash     Surgical glue - red burning itching  Surgical glue - red burning itching         Past Medical History:   Diagnosis Date    Acid reflux     Adenomatous polyp of stomach 10/01/2019    Anemia 4/13/2017  Anxiety 4/13/2017    Anxiety disorder     Arthritis     Arthritis of right acromioclavicular joint 5/20/2019    Atopic rhinitis 1/27/2017    Benign paroxysmal positional vertigo 01/27/2017    3-4 years ago (Written 10/14/2019)    Bronchitis     With sinus infections    Carpal tunnel syndrome 1/27/2017    Chondromalacia 1/27/2017    Chondromalacia of right patellofemoral joint 2/21/2017    Chronic midline low back pain without sciatica 3/14/2017    Chronic pain disorder 4/13/2017    Chronic sinusitis 01/27/2017    In past no longer per pt.  Common variable agammaglobulinemia ([facility] [address] 9/20/2017    CVID (common variable immunodeficiency) ([facility] [address]     Pt follows-up with Dr. Candance Bye (Written 10/14/2019).  DDD (degenerative disc disease), lumbar 3/14/2017    Depression     Elevation of level of transaminase or lactic acid dehydrogenase (LDH) 1/27/2017    Fever 1/17/2019    Flank pain 2/25/2018    Gastroesophageal reflux disease 1/27/2017    Hammer toe 1/27/2017    History of asthma 1/27/2017    History of knee replacement procedure of right knee 1/10/2017    History of pneumonia     3-4 years ago (Written 10/14/2019)    History of suburethral sling procedure 12/18/2017    Hx of blood clots 2017    When patient had a PIC line left chest. No longer on blood thinners per pt.  Hypothyroidism     Iron deficiency anemia 01/27/2017    No longer per pt.     Knee pain 4/13/2017    LBBB (left bundle branch block) 05/2019    Lumbosacral radiculitis 4/13/2017    Menopause present 12/10/2018    Menorrhagia 1/27/2017    Migraine headache     Mixed anxiety depressive disorder 1/27/2017    Moderate persistent asthma 1/27/2017    Morbid obesity ([facility] [address] 1/27/2017    Morbid obesity with BMI of 40.0-44.9, adult ([facility] [address] 12/4/2017    Narcolepsy     Occurred several years (Written 88/03/2988)    Nonalcoholic fatty liver disease 1/27/2017    Obesity (BMI 30-39.9) 1/10/2017    Pain due to knee joint prosthesis ([facility] [address] 4/13/2017    Pain due to total right knee replacement ([facility] [address] 4/13/2017    Pain in pelvis 9/20/2017    PONV (postoperative nausea and vomiting) 3/27/2018    Primary osteoarthritis of right knee 7/19/2017    Prolonged emergence from general anesthesia 3/27/2018    Recurrent urinary tract infection 8/17/2017    Restless leg 8/24/2017    Right knee pain 1/10/2017    Sleep apnea     Sleep apnea with use of continuous positive airway pressure (CPAP)     Status post arthroscopy of shoulder 1/2/2018    Stress incontinence 01/27/2017    In past no longer    Superior glenoid labrum lesion of shoulder 1/27/2017    Thrombocytopenic disorder ([facility] [address] 4/13/2017    Urinary urgency 02/20/2015    Only with UTI per pt.  UTI due to extended-spectrum beta lactamase (ESBL) producing Escherichia coli 12/18/2017       Prior to Admission medications    Medication Sig Start Date End Date Taking?  Authorizing Provider   benzonatate (TESSALON) 200 MG capsule Take 200 mg by mouth 3 times daily as needed 11/23/21   Historical Provider, MD   buPROPion (WELLBUTRIN XL) 150 MG extended release tablet  10/4/21   Historical Provider, MD   vitamin D 25 MCG (1000 UT) CAPS Take 1,000 Units by mouth daily    Historical Provider, MD   cyanocobalamin 1000 MCG tablet Take 1,000 mcg by mouth daily    Historical Provider, MD   esketamine (SPRAVATO, 56 MG DOSE,) 28 MG/DEVICE nasal solution Twice a Week 11/22/21   Historical Provider, MD   gabapentin (NEURONTIN) 300 MG capsule 300 mg. 7/21/21   Historical Provider, MD   lamoTRIgine (LAMICTAL) 200 MG tablet TAKE 1 TABLET BY MOUTH EVERY DAY AT BEDTIME 11/6/21   Historical Provider, MD   lamoTRIgine (LAMICTAL) 25 MG tablet TAKE 1 TABLET BY MOUTH IN THE MORNING 9/15/21   Historical Provider, MD   methylPREDNISolone (MEDROL DOSEPACK) 4 MG tablet  11/23/21   Historical Provider, MD Salvatore Fraga 93 MCG/ACT [place]  9/8/21   Historical Provider, MD   ciclopirox (PENLAC) 8 % solution Apply topically nightly. Remove once weekly with alcohol or nail polish remover. 8/23/21   Linnell Host, DPM   azelastine (ASTELIN) 0.1 % nasal spray USE 1 SPRAY into each nostril TWO TIMES A DAY 10/16/19   Historical Provider, MD   fluticasone [facility]) 50 MCG/ACT nasal spray  10/16/19   Historical Provider, MD   nitroGLYCERIN (NITROSTAT) 0.4 MG SL tablet nitroglycerin 0.4 mg sublingual tablet    Historical Provider, MD   ondansetron (ZOFRAN-ODT) 4 MG disintegrating tablet  10/13/19   Historical Provider, MD   oxyCODONE-acetaminophen (PERCOCET) 5-325 MG per tablet Take 1 tablet by mouth.  10/14/19   Historical Provider, MD   pimecrolimus (ELIDEL) 1 % cream pimecrolimus 1 % topical cream    Historical Provider, MD   budesonide-formoterol (SYMBICORT) 160-4.5 MCG/ACT AERO Inhale 2 puffs into the lungs     Historical Provider, MD   XANTHAN GUM PO Take by mouth nightly    Historical Provider, MD   sucralfate (CARAFATE) 1 GM tablet Take 1 g by mouth three times daily     Historical Provider, MD   Immune Globulin, Human, (HIZENTRA) 10 GM/50ML SOLN Inject into the skin once a week     Historical Provider, MD   pantoprazole (PROTONIX) 40 MG tablet Take 40 mg by mouth 2/14/19   Historical Provider, MD   Lutein-Zeaxanthin 25-5 MG CAPS lutein-zeaxanthin 25 mg-5 mg capsule   Take 1 capsule every day by oral route. Historical Provider, MD   LORazepam (ATIVAN) 0.5 MG tablet lorazepam 0.5 mg tablet   Take 1 tablet twice a day by oral route as needed for 30 days.  2/6/19   Historical Provider, MD   TRINTELLIX 20 MG TABS tablet TAKE 1 TABLET BY MOUTH ONE TIME A DAY 6/5/18   Historical Provider, MD   busPIRone (BUSPAR) 15 MG tablet TAKE 1 TABLET BY MOUTH TWO TIMES A DAY 6/4/18   Historical Provider, MD   rOPINIRole (REQUIP) 0.5 MG tablet Take 0.5 mg by mouth 2 times daily  10/26/17   Historical Provider, MD   albuterol sulfate HFA (PROAIR HFA) 108 (90 BASE) MCG/ACT inhaler Inhale 2 puffs into the lungs every 6 hours as needed for sites via 5.07/10g [place] Monofilament, Bilateral.  negative Tinel's, Bilateral.  negative Valleix sign, Bilateral.      Integument: Warm, dry, supple, Bilateral.  Open lesion absent, Bilateral.  Interdigital maceration absent to web spaces 1-4, Bilateral.  Nails are normal in length, thickness and color 1-5 bilateral.  Fissures absent, Bilateral.     X rays right foot 3 views: no fracture or dislocation seen. No stress fracture      Asessment: Patient is a 64 y.o. female with:    Diagnosis Orders   1. Right foot pain  XR FOOT RIGHT (MIN 3 VIEWS)         Plan: Patient examined and evaluated. Current condition and treatment options discussed in detail. Advised pt to her conditon . Pt to use stiff soled shoes at all times. . No barefoot walking and no just in socks. Verbal and written instructions given to patient. Contact office with any questions/problems/concerns. Orders Placed This Encounter   Procedures    XR FOOT RIGHT (MIN 3 VIEWS)     No orders of the defined types were placed in this encounter. RTC in 6week(s).     2/28/2022      Electronically signed by Brigida Hawkins DPM on 2/28/2022 at 3:16 PM  2/28/2022

## 2022-09-22 ENCOUNTER — HOSPITAL ENCOUNTER (OUTPATIENT)
Dept: PSYCHIATRY | Age: 57
Setting detail: THERAPIES SERIES
Discharge: HOME | End: 2022-09-22
Payer: COMMERCIAL

## 2022-09-22 DIAGNOSIS — F33.2 SEVERE EPISODE OF RECURRENT MAJOR DEPRESSIVE DISORDER, WITHOUT PSYCHOTIC FEATURES (HCC): Primary | ICD-10-CM

## 2022-09-22 PROCEDURE — 90792 PSYCH DIAG EVAL W/MED SRVCS: CPT | Performed by: PSYCHIATRY & NEUROLOGY

## 2022-09-22 PROCEDURE — 90791 PSYCH DIAGNOSTIC EVALUATION: CPT | Performed by: PSYCHIATRY & NEUROLOGY

## 2022-09-22 NOTE — H&P
Psychiatric Assessment  Intervention psychiatry       CHIEF COMPLAINT: Treatment resistant depression    History obtained from:  patient, electronic medical record    HISTORY OF PRESENT ILLNESS:    Jeffy Gardner is a 62 y.o. female with significant history of depression who presents to the office today as a new patient seeking treatment with transcranial magnetic stimulation. Patient reports that she has been dealing with depression since age 23. Mentions that she has bouts of depression however lately and last few years she has been having more intense and longer periods of depression. Reports that for the last 2 and half months she has been dealing with severe depression and mentions she has been feeling down and sad for more days than not. Reports dealing initially with passive suicidal thoughts which are somewhat getting better now. Reports constant feelings of helplessness hopelessness and worthlessness. Reports some trouble falling asleep and staying asleep. Reports good appetite. Reports very poor energy during the time. Mentions that she has no motivation to do anything. Identified stressors as some conflicts with her children and limited support from her . Could not elicit any manic or hypomanic symptoms. Could not elicit any psychotic symptoms today. Patient mentioned that she explored Spravato and of last year and notes that it was significantly helpful however she had to stop after 8 weeks due to insurance reasons. Discussed with her at length about ECT versus TMS versus Spravato. Provided her evidence for all 3 treatments, risks and benefits, contraindications and side effects. Patient chose to go with [address] [place] [address] at this time. Currently she has no contraindications for [address] [place] [address]. She has no history of seizures or any intracranial abnormalities.   She does have a pacemaker and she is willing to speak to a cardiologist to get a clearance before looking into [address] [place] [address] as an option. PSYCHIATRIC HISTORY:      Outpatient treatment: [facility]  Suicide Attempts: Denies any  Inpatient Psychiatric Admission: Denies any    Past Psychiatric Meds:     Celexa Lexapro Effexor Zoloft Wellbutrin    Adverse reactions from psychotropic medications:      Patient reports that she tried all of these medications for      Time with no benefit. Lifetime Psychiatric Review of Systems        Obsessions and Compulsions: no       Shey or Hypomania: no     Hallucinations: no     Panic Attacks:  no      Delusions:  no     Phobias: no        Past Medical History:        Diagnosis Date    Acid reflux     Adenomatous polyp of stomach 10/01/2019    Anemia 4/13/2017    Anxiety 4/13/2017    Anxiety disorder     Arthritis     Arthritis of right acromioclavicular joint 5/20/2019    Atopic rhinitis 1/27/2017    Benign paroxysmal positional vertigo 01/27/2017    3-4 years ago (Written 10/14/2019)    Bronchitis     With sinus infections    Carpal tunnel syndrome 1/27/2017    Chondromalacia 1/27/2017    Chondromalacia of right patellofemoral joint 2/21/2017    Chronic midline low back pain without sciatica 3/14/2017    Chronic pain disorder 4/13/2017    Chronic sinusitis 01/27/2017    In past no longer per pt. Common variable agammaglobulinemia ([facility] [address] 9/20/2017    CVID (common variable immunodeficiency) ([facility] [address]     Pt follows-up with Dr. Ankit Patel (Written 10/14/2019).      DDD (degenerative disc disease), lumbar 3/14/2017    Depression     Elevation of level of transaminase or lactic acid dehydrogenase (LDH) 1/27/2017    Fever 1/17/2019    Flank pain 2/25/2018    Gastroesophageal reflux disease 1/27/2017    Hammer toe 1/27/2017    History of asthma 1/27/2017    History of knee replacement procedure of right knee 1/10/2017    History of pneumonia     3-4 years ago (Written 10/14/2019)    History of suburethral sling procedure 12/18/2017    Hx of blood clots 2017    When patient had a PIC line left chest. No longer on blood thinners per pt. Hypothyroidism     Iron deficiency anemia 01/27/2017    No longer per pt. Knee pain 4/13/2017    LBBB (left bundle branch block) 05/2019    Lumbosacral radiculitis 4/13/2017    Menopause present 12/10/2018    Menorrhagia 1/27/2017    Migraine headache     Mixed anxiety depressive disorder 1/27/2017    Moderate persistent asthma 1/27/2017    Morbid obesity ([address] 1/27/2017    Morbid obesity with BMI of 40.0-44.9, adult ([address] 12/4/2017    Narcolepsy     Occurred several years (Written 98/79/5901)    Nonalcoholic fatty liver disease 1/27/2017    Obesity (BMI 30-39.9) 1/10/2017    Pain due to knee joint prosthesis ([address] 4/13/2017    Pain due to total right knee replacement ([address] 4/13/2017    Pain in pelvis 9/20/2017    PONV (postoperative nausea and vomiting) 3/27/2018    Primary osteoarthritis of right knee 7/19/2017    Prolonged emergence from general anesthesia 3/27/2018    Recurrent urinary tract infection 8/17/2017    Restless leg 8/24/2017    Right knee pain 1/10/2017    Sleep apnea     Sleep apnea with use of continuous positive airway pressure (CPAP)     Status post arthroscopy of shoulder 1/2/2018    Stress incontinence 01/27/2017    In past no longer    Superior glenoid labrum lesion of shoulder 1/27/2017    Thrombocytopenic disorder ([address] 4/13/2017    Urinary urgency 02/20/2015    Only with UTI per pt. UTI due to extended-spectrum beta lactamase (ESBL) producing Escherichia coli 12/18/2017       Past Surgical History:        Procedure Laterality Date    APPENDECTOMY  11/99    CARPAL TUNNEL RELEASE Right 12/96    Again with trigger finger 12/98    CHOLECYSTECTOMY  6/95    HAMMER TOE SURGERY Left 10/18/2019    LEFT 2ND TOE HAMMER REPAIR (Left )    HAMMER TOE SURGERY Left 10/18/2019    LEFT 2ND TOE HAMMER REPAIR performed by Evy Reid DPM at [address] [facility] Right 10/09    Knee partial per pt. JOINT REPLACEMENT Left 3/13    Knee partial per pt. KNEE ARTHROSCOPY Right     LIVER BIOPSY  11/13/2015    NECK SURGERY      Repair herniated C5-6    NOSE SURGERY  8/2000    Septoplasty, sinuosotomies, hypertrophy turbinates    OTHER SURGICAL HISTORY Left     Spinal Cord Stimulator    SHOULDER SURGERY      Manipulation and arthroscopy 12/10    SPINE SURGERY      C5-6 Fusion approx. 20 yrs. ago noted on 10/14/19    TENDON RELEASE Right     thumb    TOTAL KNEE ARTHROPLASTY Right     partial        Current Meds    Current Outpatient Medications:     benzonatate (TESSALON) 200 MG capsule, Take 200 mg by mouth 3 times daily as needed, Disp: , Rfl:     buPROPion (WELLBUTRIN XL) 150 MG extended release tablet, , Disp: , Rfl:     vitamin D 25 MCG (1000 UT) CAPS, Take 1,000 Units by mouth daily, Disp: , Rfl:     cyanocobalamin 1000 MCG tablet, Take 1,000 mcg by mouth daily, Disp: , Rfl:     esketamine (SPRAVATO, 56 MG DOSE,) 28 MG/DEVICE nasal solution, Twice a Week, Disp: , Rfl:     gabapentin (NEURONTIN) 300 MG capsule, 300 mg., Disp: , Rfl:     lamoTRIgine (LAMICTAL) 200 MG tablet, TAKE 1 TABLET BY MOUTH EVERY DAY AT BEDTIME, Disp: , Rfl:     lamoTRIgine (LAMICTAL) 25 MG tablet, TAKE 1 TABLET BY MOUTH IN THE MORNING, Disp: , Rfl:     methylPREDNISolone (MEDROL DOSEPACK) 4 MG tablet, , Disp: , Rfl:     XHANCE 93 MCG/ACT EXHU, , Disp: , Rfl:     ciclopirox (PENLAC) 8 % solution, Apply topically nightly. Remove once weekly with alcohol or nail polish remover. , Disp: 1 Bottle, Rfl: 3    azelastine (ASTELIN) 0.1 % nasal spray, USE 1 SPRAY into each nostril TWO TIMES A DAY, Disp: , Rfl: 5    fluticasone (FLONASE) 50 MCG/ACT nasal spray, , Disp: , Rfl:     nitroGLYCERIN (NITROSTAT) 0.4 MG SL tablet, nitroglycerin 0.4 mg sublingual tablet, Disp: , Rfl:     ondansetron (ZOFRAN-ODT) 4 MG disintegrating tablet, , Disp: , Rfl:     oxyCODONE-acetaminophen (PERCOCET) 5-325 MG per tablet, Take 1 tablet by mouth. , Disp: , Rfl:     pimecrolimus (ELIDEL) 1 % cream, pimecrolimus 1 % significant drug or alcohol abuse. Reports that she currently lives in [place] with her . Reports that she has 2 children. She worked as a medical assistant in the past.  Currently unemployed. Reports her last job was at a school [employer]. Family Medical and Psychiatric History:     History of depression in mother and sister. Denies any substance use or suicides in family. Problem Relation Age of Onset    Atrial Fibrillation Mother     Cancer Father         Skin Cancer    Diabetes Neg Hx          Mental Status Exam  Level of consciousness:  Within normal limits  Appearance: wearing street clothes, seated on couch  Behavior/Motor: no abnormalities noted  Attitude toward examiner:  Cooperative, attentive, good eye contact  Speech:  Spontaneous, normal rate and volume  Mood: Depressed  Affect:  mood congruent  Thought processes:  linear, goal directed and coherent  Thought content:   Denies suicidal ideations   Denies  homicidal ideations               Denies  hallucinations   Denies delusions  Cognition:  In tact  Memory: age appropriate  Insight and judgement: fair  Medication side effects:  denies      DSM-5 DIAGNOSIS:    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 Medical assistant to send out information for prior authorization. We will follow up with her in 1 week. Electronically signed by Dede Alvarez MD MD on 9/22/2022 at 2:55 PM Time Spent 45 minutes  I have discussed with the patient risks, benefits, side effects, and alternatives (and relevant risks, benefits, and side effects related to alternatives or not receiving care), and likelihood of the success. Patient instructed to seek emergency help via ED or calling 911 should symptoms become severe, or if thoughts to harm self or others develop. Patient stated clear understanding and is agreeable to treatment plan.

## 2022-10-17 ENCOUNTER — TELEPHONE (OUTPATIENT)
Dept: PODIATRY | Age: 57
End: 2022-10-17

## 2022-10-17 NOTE — TELEPHONE ENCOUNTER
Pt states she spoke to Ginger this morning who gave her an appointment for Thursday in the [facility] office. She states that is not good for her as she will be on vacation. I could not reach office on back line so I informed pt I would send a message and have office reach back out to her.

## 2022-10-19 ENCOUNTER — OFFICE VISIT (OUTPATIENT)
Dept: PODIATRY | Age: 57
End: 2022-10-19
Payer: COMMERCIAL

## 2022-10-19 VITALS — WEIGHT: 243 LBS | BODY MASS INDEX: 40.48 KG/M2 | HEIGHT: 65 IN

## 2022-10-19 DIAGNOSIS — M25.572 ACUTE LEFT ANKLE PAIN: Primary | ICD-10-CM

## 2022-10-19 PROCEDURE — 99214 OFFICE O/P EST MOD 30 MIN: CPT | Performed by: PODIATRIST

## 2022-10-19 PROCEDURE — 3017F COLORECTAL CA SCREEN DOC REV: CPT | Performed by: PODIATRIST

## 2022-10-19 PROCEDURE — G8427 DOCREV CUR MEDS BY ELIG CLIN: HCPCS | Performed by: PODIATRIST

## 2022-10-19 PROCEDURE — G8417 CALC BMI ABV UP PARAM F/U: HCPCS | Performed by: PODIATRIST

## 2022-10-19 PROCEDURE — G8484 FLU IMMUNIZE NO ADMIN: HCPCS | Performed by: PODIATRIST

## 2022-10-19 PROCEDURE — 1036F TOBACCO NON-USER: CPT | Performed by: PODIATRIST

## 2022-10-19 NOTE — PROGRESS NOTES
[address]  Dept: [phone]    RETURN PATIENT PROGRESS NOTE  Date of patient's visit: 10/19/2022  Patient's Name:  Amos Sinha YOB: 1965            Patient Care Team:  Koffi Currie DO as PCP - General (Family Medicine)  Natalie Dominguez DPM as Consulting Physician (Podiatry)  Betzy You MD as Consulting Physician (Infectious Diseases)       Amos Sinha 62 y.o. female that presents for follow-up of   Chief Complaint   Patient presents with    Foot Pain    Foot Injury     Left foot x 4 days     Pt's primary care physician is Koffi Currie DO last seen august 2 2022  Symptoms began 4 day(s) ago and are increased . Patient relates pain is Present. Pain is rated 4 out of 10 and is described as intermittent. Treatments prior to today's visit include: none. Currently denies F/C/N/V. Patient states she fell while taking a motorcycle class. Allergies   Allergen Reactions    Adhesive Tape Rash     Irritates skin, tears skin. Hylan G-F 20 Swelling and Other (See Comments)     Edema  Simvisc    Nsaids Other (See Comments)     Ulcers  Other reaction(s): GI Upset  Gastritis and ulcers  Ulcers      Xanax [Alprazolam] Other (See Comments)     Hyperactivity, jittery Other reaction(s):  Intolerance  hyperactivity    Amoxicillin      Other reaction(s): GI Disturbance    Augmentin [Amoxicillin-Pot Clavulanate] Nausea Only    Bactrim [Sulfamethoxazole-Trimethoprim]      Intolerance    Cleocin [Clindamycin Hcl] Photosensitivity    Erythromycin Nausea Only    Primidone      Other reaction(s): Dizziness    Cyanoacrylate Rash     Surgical glue - red burning itching  Surgical glue - red burning itching         Past Medical History:   Diagnosis Date    Acid reflux     Adenomatous polyp of stomach 10/01/2019    Anemia 4/13/2017    Anxiety 4/13/2017    Anxiety disorder Arthritis     Arthritis of right acromioclavicular joint 5/20/2019    Atopic rhinitis 1/27/2017    Benign paroxysmal positional vertigo 01/27/2017    3-4 years ago (Written 10/14/2019)    Bronchitis     With sinus infections    Carpal tunnel syndrome 1/27/2017    Chondromalacia 1/27/2017    Chondromalacia of right patellofemoral joint 2/21/2017    Chronic midline low back pain without sciatica 3/14/2017    Chronic pain disorder 4/13/2017    Chronic sinusitis 01/27/2017    In past no longer per pt. Common variable agammaglobulinemia ([address] 9/20/2017    CVID (common variable immunodeficiency) ([facility]     Pt follows-up with Dr. Susan Reed (Written 10/14/2019). DDD (degenerative disc disease), lumbar 3/14/2017    Depression     Elevation of level of transaminase or lactic acid dehydrogenase (LDH) 1/27/2017    Fever 1/17/2019    Flank pain 2/25/2018    Gastroesophageal reflux disease 1/27/2017    Hammer toe 1/27/2017    History of asthma 1/27/2017    History of knee replacement procedure of right knee 1/10/2017    History of pneumonia     3-4 years ago (Written 10/14/2019)    History of suburethral sling procedure 12/18/2017    Hx of blood clots 2017    When patient had a PIC line left chest. No longer on blood thinners per pt. Hypothyroidism     Iron deficiency anemia 01/27/2017    No longer per pt.     Knee pain 4/13/2017    LBBB (left bundle branch block) 05/2019    Lumbosacral radiculitis 4/13/2017    Menopause present 12/10/2018    Menorrhagia 1/27/2017    Migraine headache     Mixed anxiety depressive disorder 1/27/2017    Moderate persistent asthma 1/27/2017    Morbid obesity ([address] 1/27/2017    Morbid obesity with BMI of 40.0-44.9, adult ([address] 12/4/2017    Narcolepsy     Occurred several years (Written 85/83/2235)    Nonalcoholic fatty liver disease 1/27/2017    Obesity (BMI 30-39.9) 1/10/2017    Pain due to knee joint prosthesis ([address] 4/13/2017    Pain due to total right knee replacement ([address] 4/13/2017 Pain in pelvis 9/20/2017    PONV (postoperative nausea and vomiting) 3/27/2018    Primary osteoarthritis of right knee 7/19/2017    Prolonged emergence from general anesthesia 3/27/2018    Recurrent urinary tract infection 8/17/2017    Restless leg 8/24/2017    Right knee pain 1/10/2017    Sleep apnea     Sleep apnea with use of continuous positive airway pressure (CPAP)     Status post arthroscopy of shoulder 1/2/2018    Stress incontinence 01/27/2017    In past no longer    Superior glenoid labrum lesion of shoulder 1/27/2017    Thrombocytopenic disorder ([facility] [address] 4/13/2017    Urinary urgency 02/20/2015    Only with UTI per pt. UTI due to extended-spectrum beta lactamase (ESBL) producing Escherichia coli 12/18/2017       Prior to Admission medications    Medication Sig Start Date End Date Taking? Authorizing Provider   vitamin D 25 MCG (1000 UT) CAPS Take 1,000 Units by mouth daily   Yes Historical Provider, MD   gabapentin (NEURONTIN) 300 MG capsule 300 mg. 7/21/21  Yes Historical Provider, MD   lamoTRIgine (LAMICTAL) 200 MG tablet TAKE 1 TABLET BY MOUTH EVERY DAY AT BEDTIME 11/6/21  Yes Historical Provider, MD   pimecrolimus (ELIDEL) 1 % cream pimecrolimus 1 % topical cream   Yes Historical Provider, MD   pantoprazole (PROTONIX) 40 MG tablet Take 40 mg by mouth 2/14/19  Yes Historical Provider, MD   LORazepam (ATIVAN) 0.5 MG tablet lorazepam 0.5 mg tablet   Take 1 tablet twice a day by oral route as needed for 30 days.  2/6/19  Yes Historical Provider, MD   albuterol sulfate HFA (PROVENTIL;VENTOLIN;PROAIR) 108 (90 Base) MCG/ACT inhaler Inhale 2 puffs into the lungs every 6 hours as needed for Wheezing   Yes Historical Provider, MD   butalbital-acetaminophen-caffeine (FIORICET, ESGIC) -40 MG per tablet Take 1 tablet by mouth as needed for Headaches  1/6/15  Yes Historical Provider, MD   levothyroxine (SYNTHROID) 100 MCG tablet Take 125 mcg by mouth daily  2/9/15  Yes Historical Provider, MD   ondansetron (ZOFRAN) 4 MG tablet Take 4 mg by mouth every 8 hours as needed for Nausea  1/28/15  Yes Historical Provider, MD   benzonatate (TESSALON) 200 MG capsule Take 200 mg by mouth 3 times daily as needed 11/23/21   Historical Provider, MD   buPROPion (WELLBUTRIN XL) 150 MG extended release tablet  10/4/21   Historical Provider, MD   cyanocobalamin 1000 MCG tablet Take 1,000 mcg by mouth daily    Historical Provider, MD   esketamine (SPRAVATO, 56 MG DOSE,) 28 MG/DEVICE nasal solution Twice a Week 11/22/21   Historical Provider, MD   lamoTRIgine (LAMICTAL) 25 MG tablet TAKE 1 TABLET BY MOUTH IN THE MORNING 9/15/21   Historical Provider, MD   methylPREDNISolone (MEDROL DOSEPACK) 4 MG tablet  11/23/21   Historical Provider, MD Jose Sousa 93 MCG/ACT [place]  9/8/21   Historical Provider, MD   ciclopirox (PENLAC) 8 % solution Apply topically nightly. Remove once weekly with alcohol or nail polish remover.  8/23/21   Fahad Amaral DPM   azelastine (ASTELIN) 0.1 % nasal spray USE 1 SPRAY into each nostril TWO TIMES A DAY 10/16/19   Historical Provider, MD   fluticasone Mohit Young) 50 MCG/ACT nasal spray  10/16/19   Historical Provider, MD   nitroGLYCERIN (NITROSTAT) 0.4 MG SL tablet nitroglycerin 0.4 mg sublingual tablet    Historical Provider, MD   ondansetron (ZOFRAN-ODT) 4 MG disintegrating tablet  10/13/19   Historical Provider, MD   oxyCODONE-acetaminophen (PERCOCET) 5-325 MG per tablet Take 1 tablet by mouth.  10/14/19   Historical Provider, MD   budesonide-formoterol (SYMBICORT) 160-4.5 MCG/ACT AERO Inhale 2 puffs into the lungs     Historical Provider, MD   XANTHAN GUM PO Take by mouth nightly    Historical Provider, MD   sucralfate (CARAFATE) 1 GM tablet Take 1 g by mouth three times daily     Historical Provider, MD   Immune Globulin, Human, (HIZENTRA) 10 GM/50ML SOLN Inject into the skin once a week     Historical Provider, MD   Lutein-Zeaxanthin 25-5 MG CAPS lutein-zeaxanthin 25 mg-5 mg capsule   Take 1 capsule every day by oral route. Historical Provider, MD   TRINTELLIX 20 MG TABS tablet TAKE 1 TABLET BY MOUTH ONE TIME A DAY 6/5/18   Historical Provider, MD   busPIRone (BUSPAR) 15 MG tablet TAKE 1 TABLET BY MOUTH TWO TIMES A DAY 6/4/18   Historical Provider, MD   rOPINIRole (REQUIP) 0.5 MG tablet Take 0.5 mg by mouth 2 times daily  10/26/17   Historical Provider, MD       Review of Systems    Review of Systems:  History obtained from chart review and the patient  General ROS: negative for - chills, fatigue, fever, night sweats or weight gain  Constitutional: Negative for chills, diaphoresis, fatigue, fever and unexpected weight change. Musculoskeletal: Positive for arthralgias, gait problem and joint swelling. Neurological ROS: negative for - behavioral changes, confusion, headaches or seizures. Negative for weakness and numbness. Dermatological ROS: negative for - mole changes, rash  Cardiovascular: Negative for leg swelling. Gastrointestinal: Negative for constipation, diarrhea, nausea and vomiting. Lower Extremity Physical Examination:     Vitals: There were no vitals filed for this visit. General: AAO x 3 in NAD. Dermatologic Exam:  Skin lesion/ulceration Absent . Skin No rashes or nodules noted. .       Musculoskeletal:     1st MPJ ROM decreased, Bilateral.  Muscle strength 5/5, Bilateral.  Pain present upon palpation of left ankle at the ATLF and CFL. Pain with peroneal tendons and pain with inversion and eversoin . Medial longitudinal arch, Bilateral WNL.   Ankle ROM WNL,Bilateral.    Dorsally contracted digits absent digits 1-5 Bilateral.     Vascular: DP and PT pulses palpable 2/4, Bilateral.  CFT <3 seconds, Bilateral.  Hair growth present to the level of the digits, Bilateral.  Edema absent, Bilateral.  Varicosities absent, Bilateral. Erythema absent, Bilateral    Neurological: Sensation intact to light touch to level of digits, Bilateral.  Protective sensation intact 10/10 sites via 5.07/10g Hancock-Darryl Monofilament, Bilateral.  negative Tinel's, Bilateral.  negative Valleix sign, Bilateral.      Integument: Warm, dry, supple, Bilateral.  Open lesion absent, Bilateral.  Interdigital maceration absent to web spaces 1-4, Bilateral.  Nails are normal in length, thickness and color 1-5 bilateral.  Fissures absent, Bilateral.     X rays left ankle 3 views:  no fracture or dislocation seen. Edema to the latarsal left ankle   Asessment: Patient is a 62 y.o. female with:    Diagnosis Orders   1. Acute left ankle pain  XR ANKLE LEFT (MIN 3 VIEWS)            Plan: Patient examined and evaluated. Current condition and treatment options discussed in detail. Advised pt to her condition and the x ray findings of the left ankle         A prefabricated tall and pneumatic Ankle-Foot Orthosis/CAM walker was dispensed and applied at this visit. Due to the patient's diagnosis and related symptoms this equipment is medically necessary for treatment. The function of this device is to restrict and limit motion, provide stabilization, immobilization, and compression to the affected area to reduce swelling. The goals and function of this device was explained in detail to the patient. Upon gait analysis, the device appeared to be fitting well and the patient states that the device is comfortable at this time. The patient was shown and told in detail how to properly apply, remove, wear and care for the device. Patient was able to apply the device properly and was able to ambulate without distress. At the time the device was dispensed, it was suitable for the condition and was not substandard. No guarantees were given and precautions were reviewed. Patient was instructed not to drive a car when wearing this device. All questions were answered. Written instructions and warranty information was given along with the list of the thirty (30) Durable Medical Equipment Supplier Guidelines. All questions were answered.  Discussed signs and symptoms of deep venous thrombosis and pulmonary embolus. Call if they occur. We also discussed mechanical methods to prevent deep venous thrombosis and pulmonary embolus  . Verbal and written instructions given to patient. Contact office with any questions/problems/concerns. Orders Placed This Encounter   Procedures    XR ANKLE LEFT (MIN 3 VIEWS)     No orders of the defined types were placed in this encounter. All labs were reviewed and all imagining including the above findings were reviewed PRIOR to the patients arrival and with the patient today. Previous patient encounter was reviewed. Encounters from the patients other medical providers were reviewed and noted. Time was spent educating the patient and their families/caregivers on proper care of the feet and ankles. All the above diagnosis were addressed at todays visit and all questions were answered. A total of 30 minutes was spent with this patients encounter which included charting after the patients visit     RTC in 3week(s).     10/19/2022      Electronically signed by Artemio Forde DPM on 10/19/2022 at 11:46 AM  10/19/2022

## 2022-11-09 ENCOUNTER — OFFICE VISIT (OUTPATIENT)
Dept: PODIATRY | Age: 57
End: 2022-11-09
Payer: COMMERCIAL

## 2022-11-09 ENCOUNTER — TELEPHONE (OUTPATIENT)
Dept: PODIATRY | Age: 57
End: 2022-11-09

## 2022-11-09 VITALS — HEIGHT: 65 IN | WEIGHT: 243 LBS | BODY MASS INDEX: 40.48 KG/M2

## 2022-11-09 DIAGNOSIS — S86.312D PERONEAL TENDON TEAR, LEFT, SUBSEQUENT ENCOUNTER: Primary | ICD-10-CM

## 2022-11-09 PROCEDURE — G8417 CALC BMI ABV UP PARAM F/U: HCPCS | Performed by: PODIATRIST

## 2022-11-09 PROCEDURE — G8484 FLU IMMUNIZE NO ADMIN: HCPCS | Performed by: PODIATRIST

## 2022-11-09 PROCEDURE — 3017F COLORECTAL CA SCREEN DOC REV: CPT | Performed by: PODIATRIST

## 2022-11-09 PROCEDURE — 1036F TOBACCO NON-USER: CPT | Performed by: PODIATRIST

## 2022-11-09 PROCEDURE — G8427 DOCREV CUR MEDS BY ELIG CLIN: HCPCS | Performed by: PODIATRIST

## 2022-11-09 PROCEDURE — 99214 OFFICE O/P EST MOD 30 MIN: CPT | Performed by: PODIATRIST

## 2022-11-09 NOTE — TELEPHONE ENCOUNTER
Patient called and stated pre-service only offered her an mri at [facility]. Patient is not wanting to go there if she does not have to. She would like to be able to go to [facility] if possible. She would like to know if an order can be put in so that she can go outside of a [facility] facility.  CLARKE
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
## 2022-11-23 ENCOUNTER — OFFICE VISIT (OUTPATIENT)
Dept: PODIATRY | Age: 57
End: 2022-11-23

## 2022-11-23 VITALS — WEIGHT: 243 LBS | HEIGHT: 65 IN | BODY MASS INDEX: 40.48 KG/M2

## 2022-11-23 DIAGNOSIS — L60.0 INGROWING NAIL: Primary | ICD-10-CM

## 2022-11-23 DIAGNOSIS — M79.671 RIGHT FOOT PAIN: ICD-10-CM

## 2022-11-23 DIAGNOSIS — M79.672 LEFT FOOT PAIN: ICD-10-CM

## 2022-11-23 DIAGNOSIS — L03.031 CELLULITIS OF GREAT TOE OF RIGHT FOOT: ICD-10-CM

## 2022-11-23 DIAGNOSIS — L03.032 CELLULITIS OF THIRD TOE OF LEFT FOOT: ICD-10-CM

## 2022-11-23 NOTE — PROGRESS NOTES
[address]  Dept: [phone]     INGROWN TOENAIL NOTE  Date of patient's visit: 11/23/2022  Patient's Name:  Soniya Pickett YOB: 1965            Patient Care Team:  Analilia Winslow DO as PCP - General (Family Medicine)  Kamryn Rhodes DPM as Consulting Physician ([address] [place] [address])  Laci Lino MD as Consulting Physician (Infectious Diseases)      Chief Complaint   Patient presents with    Ingrown Toenail     Right great toe  3rd left toe        Soni Bledsoe 62 y.o. female that presents complaining of a painful infected ingrown toenail on the 1st Right toes and 3rd left toe. Conservative therapy has failed. Symptoms began 4 month(s) ago. Patient relates pain is presnt  . Pain is rated 3 out of 10 and is described as none. Treatments prior to today's visit include: previous podiatry treatment. Currently denies F/C/N/V. Allergies   Allergen Reactions    Adhesive Tape Rash     Irritates skin, tears skin. Hylan G-F 20 Swelling and Other (See Comments)     Edema  Simvisc    Nsaids Other (See Comments)     Ulcers  Other reaction(s): GI Upset  Gastritis and ulcers  Ulcers      Xanax [Alprazolam] Other (See Comments)     Hyperactivity, jittery Other reaction(s):  Intolerance  hyperactivity    Amoxicillin      Other reaction(s): GI Disturbance    Augmentin [Amoxicillin-Pot Clavulanate] Nausea Only    Bactrim [Sulfamethoxazole-Trimethoprim]      Intolerance    Cleocin [Clindamycin Hcl] Photosensitivity    Erythromycin Nausea Only    Primidone      Other reaction(s): Dizziness    Cyanoacrylate Rash     Surgical glue - red burning itching  Surgical glue - red burning itching         Past Medical History:   Diagnosis Date    Acid reflux     Adenomatous polyp of stomach 10/01/2019    Anemia 4/13/2017    Anxiety 4/13/2017    Anxiety disorder     Arthritis     Arthritis of right acromioclavicular joint 5/20/2019    Atopic rhinitis 1/27/2017    Benign paroxysmal positional vertigo 01/27/2017    3-4 years ago (Written 10/14/2019)    Bronchitis     With sinus infections    Carpal tunnel syndrome 1/27/2017    Chondromalacia 1/27/2017    Chondromalacia of right patellofemoral joint 2/21/2017    Chronic midline low back pain without sciatica 3/14/2017    Chronic pain disorder 4/13/2017    Chronic sinusitis 01/27/2017    In past no longer per pt. Common variable agammaglobulinemia ([address] 9/20/2017    CVID (common variable immunodeficiency) ([address]     Pt follows-up with Dr. Imani Diaz (Written 10/14/2019). DDD (degenerative disc disease), lumbar 3/14/2017    Depression     Elevation of level of transaminase or lactic acid dehydrogenase (LDH) 1/27/2017    Fever 1/17/2019    Flank pain 2/25/2018    Gastroesophageal reflux disease 1/27/2017    Hammer toe 1/27/2017    History of asthma 1/27/2017    History of knee replacement procedure of right knee 1/10/2017    History of pneumonia     3-4 years ago (Written 10/14/2019)    History of suburethral sling procedure 12/18/2017    Hx of blood clots 2017    When patient had a PIC line left chest. No longer on blood thinners per pt. Hypothyroidism     Iron deficiency anemia 01/27/2017    No longer per pt.     Knee pain 4/13/2017    LBBB (left bundle branch block) 05/2019    Lumbosacral radiculitis 4/13/2017    Menopause present 12/10/2018    Menorrhagia 1/27/2017    Migraine headache     Mixed anxiety depressive disorder 1/27/2017    Moderate persistent asthma 1/27/2017    Morbid obesity ([address] 1/27/2017    Morbid obesity with BMI of 40.0-44.9, adult ([address] 12/4/2017    Narcolepsy     Occurred several years (Written 80/87/5017)    Nonalcoholic fatty liver disease 1/27/2017    Obesity (BMI 30-39.9) 1/10/2017    Pain due to knee joint prosthesis ([address] 4/13/2017    Pain due to total right knee replacement ([address] 4/13/2017    Pain in pelvis 9/20/2017 PONV (postoperative nausea and vomiting) 3/27/2018    Primary osteoarthritis of right knee 7/19/2017    Prolonged emergence from general anesthesia 3/27/2018    Recurrent urinary tract infection 8/17/2017    Restless leg 8/24/2017    Right knee pain 1/10/2017    Sleep apnea     Sleep apnea with use of continuous positive airway pressure (CPAP)     Status post arthroscopy of shoulder 1/2/2018    Stress incontinence 01/27/2017    In past no longer    Superior glenoid labrum lesion of shoulder 1/27/2017    Thrombocytopenic disorder ([facility] [address] 4/13/2017    Urinary urgency 02/20/2015    Only with UTI per pt. UTI due to extended-spectrum beta lactamase (ESBL) producing Escherichia coli 12/18/2017       Prior to Admission medications    Medication Sig Start Date End Date Taking? Authorizing Provider   vitamin D 25 MCG (1000 UT) CAPS Take 1,000 Units by mouth daily   Yes Historical Provider, MD   gabapentin (NEURONTIN) 300 MG capsule 300 mg. 7/21/21  Yes Historical Provider, MD   lamoTRIgine (LAMICTAL) 200 MG tablet TAKE 1 TABLET BY MOUTH EVERY DAY AT BEDTIME 11/6/21  Yes Historical Provider, MD   pimecrolimus (ELIDEL) 1 % cream pimecrolimus 1 % topical cream   Yes Historical Provider, MD   pantoprazole (PROTONIX) 40 MG tablet Take 40 mg by mouth 2/14/19  Yes Historical Provider, MD   LORazepam (ATIVAN) 0.5 MG tablet lorazepam 0.5 mg tablet   Take 1 tablet twice a day by oral route as needed for 30 days.  2/6/19  Yes Historical Provider, MD   albuterol sulfate HFA (PROVENTIL;VENTOLIN;PROAIR) 108 (90 Base) MCG/ACT inhaler Inhale 2 puffs into the lungs every 6 hours as needed for Wheezing   Yes Historical Provider, MD   butalbital-acetaminophen-caffeine (FIORICET, ESGIC) -40 MG per tablet Take 1 tablet by mouth as needed for Headaches  1/6/15  Yes Historical Provider, MD   levothyroxine (SYNTHROID) 100 MCG tablet Take 125 mcg by mouth daily  2/9/15  Yes Historical Provider, MD   ondansetron (ZOFRAN) 4 MG tablet Take 4 mg by mouth every 8 hours as needed for Nausea  1/28/15  Yes Historical Provider, MD   benzonatate (TESSALON) 200 MG capsule Take 200 mg by mouth 3 times daily as needed 11/23/21   Historical Provider, MD   buPROPion (WELLBUTRIN XL) 150 MG extended release tablet  10/4/21   Historical Provider, MD   cyanocobalamin 1000 MCG tablet Take 1,000 mcg by mouth daily    Historical Provider, MD   esketamine (SPRAVATO, 56 MG DOSE,) 28 MG/DEVICE nasal solution Twice a Week 11/22/21   Historical Provider, MD   lamoTRIgine (LAMICTAL) 25 MG tablet TAKE 1 TABLET BY MOUTH IN THE MORNING 9/15/21   Historical Provider, MD   methylPREDNISolone (MEDROL DOSEPACK) 4 MG tablet  11/23/21   Historical Provider, MD Bland Quitter 93 MCG/ACT [place]  9/8/21   Historical Provider, MD   ciclopirox (PENLAC) 8 % solution Apply topically nightly. Remove once weekly with alcohol or nail polish remover. 8/23/21   Natalie Dominguez DPM   azelastine (ASTELIN) 0.1 % nasal spray USE 1 SPRAY into each nostril TWO TIMES A DAY 10/16/19   Historical Provider, MD   fluticasone [facility]) 50 MCG/ACT nasal spray  10/16/19   Historical Provider, MD   nitroGLYCERIN (NITROSTAT) 0.4 MG SL tablet nitroglycerin 0.4 mg sublingual tablet    Historical Provider, MD   ondansetron (ZOFRAN-ODT) 4 MG disintegrating tablet  10/13/19   Historical Provider, MD   oxyCODONE-acetaminophen (PERCOCET) 5-325 MG per tablet Take 1 tablet by mouth.  10/14/19   Historical Provider, MD   budesonide-formoterol (SYMBICORT) 160-4.5 MCG/ACT AERO Inhale 2 puffs into the lungs     Historical Provider, MD   XANTHAN GUM PO Take by mouth nightly    Historical Provider, MD   sucralfate (CARAFATE) 1 GM tablet Take 1 g by mouth three times daily     Historical Provider, MD   Immune Globulin, Human, (HIZENTRA) 10 GM/50ML SOLN Inject into the skin once a week     Historical Provider, MD   Lutein-Zeaxanthin 25-5 MG CAPS lutein-zeaxanthin 25 mg-5 mg capsule   Take 1 capsule every day by oral route.     Historical Provider, MD   TRINTELLIX 20 MG TABS tablet TAKE 1 TABLET BY MOUTH ONE TIME A DAY 6/5/18   Historical Provider, MD   busPIRone (BUSPAR) 15 MG tablet TAKE 1 TABLET BY MOUTH TWO TIMES A DAY 6/4/18   Historical Provider, MD   rOPINIRole (REQUIP) 0.5 MG tablet Take 0.5 mg by mouth 2 times daily  10/26/17   Historical Provider, MD       Past Surgical History:   Procedure Laterality Date    APPENDECTOMY  11/99    CARPAL TUNNEL RELEASE Right 12/96    Again with trigger finger 12/98    CHOLECYSTECTOMY  6/95    HAMMER TOE SURGERY Left 10/18/2019    LEFT 2ND TOE HAMMER REPAIR (Left )    HAMMER TOE SURGERY Left 10/18/2019    LEFT 2ND TOE HAMMER REPAIR performed by Chuy Vitale DPM at [facility] Right 10/09    Knee partial per pt. JOINT REPLACEMENT Left 3/13    Knee partial per pt. KNEE ARTHROSCOPY Right     LIVER BIOPSY  11/13/2015    NECK SURGERY      Repair herniated C5-6    NOSE SURGERY  8/2000    Septoplasty, sinuosotomies, hypertrophy turbinates    OTHER SURGICAL HISTORY Left     Spinal Cord Stimulator    SHOULDER SURGERY      Manipulation and arthroscopy 12/10    SPINE SURGERY      C5-6 Fusion approx. 20 yrs. ago noted on 10/14/19    TENDON RELEASE Right     thumb    TOTAL KNEE ARTHROPLASTY Right     partial        Family History   Problem Relation Age of Onset    Atrial Fibrillation Mother     Cancer Father         Skin Cancer    Diabetes Neg Hx        Social History     Tobacco Use    Smoking status: Never    Smokeless tobacco: Never   Substance Use Topics    Alcohol use: Yes     Comment: Rarely 1-2 a year       Lower Extremity Physical Examination:     Vitals: There were no vitals filed for this visit. General: AAO x 3 in NAD. Integument: Incurvated toenail with localized swelling, pain, erythema, and purulence 1st and 3rd Left and Right  Dermatologic Exam:  Skin lesion/ulceration Absent . Skin No rashes or nodules noted. .       Musculoskeletal:     1st MPJ ROM decreased, Bilateral.  Muscle strength 5/5, Bilateral.  Pain present upon palpation of right 1st toe and left 3rd toe with erythema and edema. .  Medial longitudinal arch, Bilateral WNL. Ankle ROM WNL,Bilateral.    Dorsally contracted digits absent digits 1-5 Bilateral.     Vascular: DP and PT pulses palpable 2/4, Bilateral.  CFT <3 seconds, Bilateral.  Hair growth present to the level of the digits, Bilateral.  Edema absent, Bilateral.  Varicosities absent, Bilateral. Erythema absent, Bilateral    Neurological: Sensation intact to light touch to level of digits, Bilateral.  Protective sensation intact 10/10 sites via 5.07/10g [place] Monofilament, Bilateral.  negative Tinel's, Bilateral.  negative Valleix sign, Bilateral.      Integument: Warm, dry, supple, Bilateral.  Open lesion absent, Bilateral.  Interdigital maceration absent to web spaces 1-4, Bilateral.  Nails are normal in length, thickness and color 1-5 bilateral.  Fissures absent, Bilateral.     Asessment: Patient is a 62 y.o. female with:    Diagnosis Orders   1. Ingrowing nail  29307 - [place] REMOVAL OF NAIL BED      2. Right foot pain  79603 - [place] REMOVAL OF NAIL BED      3. Left foot pain  28286 - [place] REMOVAL OF NAIL BED      4. Cellulitis of great toe of right foot  61579 - [place] REMOVAL OF NAIL BED      5. Cellulitis of third toe of left foot  04521 - [place] REMOVAL OF NAIL BED          Plan: Patient examined and evaluated. Current condition and treatment options discussed in detail. Verbal consent obtained for chemical matrixectomy after all questions answered. Local anesthesia obtained via Hallux block to the Right hallux  and left 3rd toe utilizing 5 cc of 1% Lidocaine plain. Next the Right hallux and left 3rd toe were prepped with Betadine. A digital tourniquet was applied. A freer elevator was used to free the medial nail border.   An english anvil was used to remove the offending border in total.  A curette was used to ensure the offending border was free of any remaining nail. Next, three 30 second applications of 28% Phenol were applied to the offending nail border followed by an isopropyl alcohol flush. Triple antibiotic ointment was applied to the nail border followed by a semi-compressive dressing. The patient was instructed to leave this dressing clean/dry/intact until the following am at which point they will begin warm epsom salt soaks followed by application of antibiotic ointment and Band-Aid BID. Patient instructed to take Tylenol PRN pain. Post-operative chemical matrixectomy instruction sheet dispensed to patient. All labs were reviewed and all imagining including the above findings were reviewed PRIOR to the patients arrival and with the patient today. Previous patient encounter was reviewed. Encounters from the patients other medical providers were reviewed and noted. Time was spent educating the patient and their families/caregivers on proper care of the feet and ankles. All the above diagnosis were addressed at todays visit and all questions were answered. A total of 30 minutes was spent with this patients encounter which included charting after the patients visit    No orders of the defined types were placed in this encounter.      RTC in 2week(s) and also with the MRI .    11/23/2022

## 2022-11-29 ENCOUNTER — HOSPITAL ENCOUNTER (OUTPATIENT)
Dept: PSYCHIATRY | Age: 57
Setting detail: THERAPIES SERIES
Discharge: HOME | End: 2022-11-29
Payer: COMMERCIAL

## 2022-11-29 DIAGNOSIS — F33.2 MDD (MAJOR DEPRESSIVE DISORDER), RECURRENT SEVERE, WITHOUT PSYCHOSIS (HCC): Primary | ICD-10-CM

## 2022-11-29 PROCEDURE — 90868 TCRANIAL MAGN STIM TX DELI: CPT | Performed by: PSYCHIATRY & NEUROLOGY

## 2022-11-29 PROCEDURE — 90867 TCRANIAL MAGN STIM TX PLAN: CPT | Performed by: PSYCHIATRY & NEUROLOGY

## 2022-11-29 NOTE — PROGRESS NOTES
Transcranial Magnetic Stimulation Procedure  2022    Alpheus   1965      Subjective:    PHQ-9 Screenin        Procedure Performed: Transcranial Magnetic Stimulation ([address] [facility]    Procedure Detail: Appropriate protocol was followed to verify the identity of the patient and the correct procedure being performed with the patient's participation and agreement. Informed consent is verified and on file. The optimal treatment parameters as determined during the initial Motor Threshold TMS device are continued. The patient was positioned in the [address] [place], [place] [address] treatment chair for comfort. The Mag[employer] TMS Figure 8 treatment coil was placed at the optimal treatment location as determined during the initial Motor Threshold and Treatment Location Determination Procedure, and the treatment parameters for the patient were confirmed. The patient was offered hearing protection and all jewelry and metal was removed. The Magstim [address] [facility] [place] Treatment was delivered. Patient was monitored during the [address] [facility] [place] treatment. Treatment Number: 1    Dose at which the procedure was performed: 35    Patient's response before and after the procedure: Patient tolerated procedure well. No complaints of discomfort before, during, or after treatment. 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 Will repeat depression screening weekly utilizing PHQ-9 to monitor efficacy of treatment. Physician notified after the procedure.

## 2022-11-30 ENCOUNTER — HOSPITAL ENCOUNTER (OUTPATIENT)
Dept: PSYCHIATRY | Age: 57
Setting detail: THERAPIES SERIES
Discharge: HOME | End: 2022-11-30
Payer: COMMERCIAL

## 2022-11-30 DIAGNOSIS — F33.2 MDD (MAJOR DEPRESSIVE DISORDER), RECURRENT SEVERE, WITHOUT PSYCHOSIS (HCC): Primary | ICD-10-CM

## 2022-11-30 PROCEDURE — 90868 TCRANIAL MAGN STIM TX DELI: CPT | Performed by: PSYCHIATRY & NEUROLOGY

## 2022-11-30 NOTE — PROGRESS NOTES
Transcranial Magnetic Stimulation Procedure  2022    French Speed  1965      Subjective:    PHQ-9 Screenin        Procedure Performed: Transcranial Magnetic Stimulation ([facility])    Procedure Detail: Appropriate protocol was followed to verify the identity of the patient and the correct procedure being performed with the patient's participation and agreement. Informed consent is verified and on file. The optimal treatment parameters as determined during the initial Motor Threshold TMS device are continued. The patient was positioned in the [address] [place] [address] treatment chair for comfort. The MagstPrivacyProtector TMS Figure 8 treatment coil was placed at the optimal treatment location as determined during the initial Motor Threshold and Treatment Location Determination Procedure, and the treatment parameters for the patient were confirmed. The patient was offered hearing protection and all jewelry and metal was removed. The Magstim [address] [facility] Treatment was delivered. Patient was monitored during the [address] [facility] treatment. Treatment Number: 2    Dose at which the procedure was performed: 45    Patient's response before and after the procedure: Patient tolerated procedure well. No complaints of discomfort before, during, or after treatment. 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 Physician notified after the procedure.

## 2022-12-01 ENCOUNTER — HOSPITAL ENCOUNTER (OUTPATIENT)
Dept: PSYCHIATRY | Age: 57
Setting detail: THERAPIES SERIES
Discharge: HOME | End: 2022-12-01
Payer: COMMERCIAL

## 2022-12-01 DIAGNOSIS — F33.2 MDD (MAJOR DEPRESSIVE DISORDER), RECURRENT SEVERE, WITHOUT PSYCHOSIS (HCC): Primary | ICD-10-CM

## 2022-12-01 PROCEDURE — 90868 TCRANIAL MAGN STIM TX DELI: CPT | Performed by: PSYCHIATRY & NEUROLOGY

## 2022-12-01 NOTE — PROGRESS NOTES
Transcranial Magnetic Stimulation Procedure  2022    Amos Sinha  1965      Subjective:    PHQ-9 Screenin        Procedure Performed: Transcranial Magnetic Stimulation ([address] [facility] [place])    Procedure Detail: Appropriate protocol was followed to verify the identity of the patient and the correct procedure being performed with the patient's participation and agreement. Informed consent is verified and on file. The optimal treatment parameters as determined during the initial Motor Threshold TMS device are continued. The patient was [facility] the [facility] comfort. The MagstGreendizer TMS Figure 8 treatment coil was placed at the optimal treatment location as determined during the initial Motor Threshold and Treatment Location Determination Procedure, and the treatment parameters for the patient were confirmed. The patient was offered hearing protection and all jewelry and metal was removed. The Magstim [address] [facility] [place] Treatment was delivered. Patient was monitored during the [address] [facility] [place] treatment. Treatment Number: 3    Dose at which the procedure was performed: 55    Patient's response before and after the procedure: Patient tolerated procedure well. No complaints of discomfort before, during, or after treatment. 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 Physician notified after the procedure.

## 2022-12-01 NOTE — PROGRESS NOTES
Transcranial Magnetic Stimulation Procedure Note       Procedure Performed: Transcranial Magnetic Stimulation (TMS) Mapping and Motor Threshold Determination. Verbal and written consent were obtained from the patient before the procedure. Procedure Detail: Measurements were taken to determine the EEG C3 location for the patient. At the C3 location, single pulse mode was used to determine patients Motor Threshold at 100% (83%) and the optimal treatment location. The Treatment Power Level was determined by calculating 120% of the Motor Threshold-100%. The treatment location was determined by measuring anteriorly 5.5cm (EEG F3 location) from the location of the optimal thumb twitch. The Magstim [address] [place] treatment coil was placed at the optimal treatment location as determined. The Magstim device was placed in repetitive TMS mode, and treatment was delivered. Patient was monitored during [address] [place] treatment. Target Power Level (120% of MT) = 100  Power Level Achieved for this [address] [place] treatment = 35    Subjective:   Patient tolerated the procedure well at power of 35%. Patient understands that this is a suboptimal dose however she is willing to work up the power level during the follow-up treatments. She continues to report feeling down and sad and depressed today. Denies any suicidal or homicidal ideation plan or intent today. She is compliant with the medications. She tolerated the first treatment well and denies any side effects after the treatment.      Objective:   Mental Status Examination: Level of consciousness: Within normal limits   Appearance: Street clothes fair grooming   Behavior/Motor: Psychomotor retardation   Attitude toward examiner: Somewhat withdrawn, cooperative   Speech: Normal rate rhythm and tone   Mood: Depressed   Affect: congruent with mood   Thought processes: Goal directed   Thought content: denies suicidal ideations   denies homicidal ideations   denieshallucinations   denies delusions   Cognition: Oriented to self, location, time, situation   Concentration clinically adequate   Memory: intact   Insight &Judgment: fair     Assessment:   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 Patient is tolerating the procedure well. Will continue to follow up with her regularly during the procedures.      Time spent: 60 mins

## 2022-12-02 NOTE — PROGRESS NOTES
Progress Note                           Transcranial Magnetic Stimulation Procedure      Procedure Performed: Transcranial Magnetic Stimulation (TMS)      Procedure Detail: The optimal treatment parameters as determined during the initial Motor Threshold and Treatment Location Determination Procedure were entered into the [employer] TMS device. The patient was positioned in the [address] [place] [address] treatment chair. The Magstim TMS treatment coil was placed at the optimal treatment location as determined during the initial Motor Threshold and Treatment Location Determination Procedure, and the treatment parameters for the patient were confirmed. The [employer] [address] [facility] Treatment was delivered. Patient was monitored during [address] [facility] treatment  . Subjective:   Patient tolerated procedure well. No issues today, denied any side effects after the procedure. Target Power Level (120% of MT) = 100  Power Level Achieved for this [address] [facility] treatment = 45     Assessment:   MDD recurrent severe without pscyhosis     Plan:   Will continue at the current therapeutic dose  Will continue to follow up with her

## 2022-12-05 ENCOUNTER — HOSPITAL ENCOUNTER (OUTPATIENT)
Dept: PSYCHIATRY | Age: 57
Setting detail: THERAPIES SERIES
Discharge: HOME | End: 2022-12-05
Payer: COMMERCIAL

## 2022-12-05 DIAGNOSIS — F33.2 MDD (MAJOR DEPRESSIVE DISORDER), RECURRENT SEVERE, WITHOUT PSYCHOSIS (HCC): Primary | ICD-10-CM

## 2022-12-05 PROCEDURE — 90868 TCRANIAL MAGN STIM TX DELI: CPT | Performed by: PSYCHIATRY & NEUROLOGY

## 2022-12-05 NOTE — PROGRESS NOTES
Transcranial Magnetic Stimulation Procedure  2022    Stacie Monroy  1965      Subjective:    PHQ-9 Screenin        Procedure Performed: Transcranial Magnetic Stimulation ([address] [facility])    Procedure Detail: Appropriate protocol was followed to verify the identity of the patient and the correct procedure being performed with the patient's participation and agreement. Informed consent is verified and on file. The optimal treatment parameters as determined during the initial Motor Threshold TMS device are continued. The patient was positioned in the [address] [place], [place] [address] treatment chair for comfort. The MagstAxis[employer]oms TMS Figure 8 treatment coil was placed at the optimal treatment location as determined during the initial Motor Threshold and Treatment Location Determination Procedure, and the treatment parameters for the patient were confirmed. The patient was offered hearing protection and all jewelry and metal was removed. The Magstim [address] [facility] Treatment was delivered. Patient was monitored during the [address] [facility] treatment. Treatment Number: 4    Dose at which the procedure was performed: 65    Patient's response before and after the procedure: Patient tolerated procedure well. No complaints of discomfort before, during, or after treatment. 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 Physician notified after the procedure.

## 2022-12-06 ENCOUNTER — HOSPITAL ENCOUNTER (OUTPATIENT)
Dept: PSYCHIATRY | Age: 57
Setting detail: THERAPIES SERIES
Discharge: HOME | End: 2022-12-06
Payer: COMMERCIAL

## 2022-12-06 DIAGNOSIS — F33.2 MDD (MAJOR DEPRESSIVE DISORDER), RECURRENT SEVERE, WITHOUT PSYCHOSIS (HCC): Primary | ICD-10-CM

## 2022-12-06 PROCEDURE — 90868 TCRANIAL MAGN STIM TX DELI: CPT | Performed by: PSYCHIATRY & NEUROLOGY

## 2022-12-06 NOTE — PROGRESS NOTES
Transcranial Magnetic Stimulation Procedure  2022    Soniya Pickett  1965      Subjective:    PHQ-9 Screenin        Procedure Performed: [facility] ([facility]    Procedure Detail: Appropriate protocol was followed to verify the identity of the patient and the correct procedure being performed with the patient's participation and agreement. Informed consent is verified and on file. The optimal treatment parameters as determined during the initial Motor Threshold TMS device are continued. The patient was positioned in the [address] [place] [address] treatment chair for comfort. The MagstBlackBridge TMS Figure 8 treatment coil was placed at the optimal treatment location as determined during the initial Motor Threshold and Treatment Location Determination Procedure, and the treatment parameters for the patient were confirmed. The patient was offered hearing protection and all jewelry and metal was removed. The Magstim [address] [facility] Treatment was delivered. Patient was monitored during the [facility] treatment. Treatment Number: 5    Dose at which the procedure was performed: 75    Patient's response before and after the procedure: Patient tolerated procedure well. No complaints of discomfort before, during, or after treatment. 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 Physician notified after the procedure.

## 2022-12-07 ENCOUNTER — OFFICE VISIT (OUTPATIENT)
Dept: PODIATRY | Age: 57
End: 2022-12-07
Payer: COMMERCIAL

## 2022-12-07 ENCOUNTER — HOSPITAL ENCOUNTER (OUTPATIENT)
Dept: PSYCHIATRY | Age: 57
Setting detail: THERAPIES SERIES
Discharge: HOME | End: 2022-12-07
Payer: COMMERCIAL

## 2022-12-07 VITALS — BODY MASS INDEX: 40.48 KG/M2 | HEIGHT: 65 IN | WEIGHT: 243 LBS

## 2022-12-07 DIAGNOSIS — M25.572 ACUTE LEFT ANKLE PAIN: ICD-10-CM

## 2022-12-07 DIAGNOSIS — S86.312D PERONEAL TENDON TEAR, LEFT, SUBSEQUENT ENCOUNTER: Primary | ICD-10-CM

## 2022-12-07 DIAGNOSIS — F33.2 MDD (MAJOR DEPRESSIVE DISORDER), RECURRENT SEVERE, WITHOUT PSYCHOSIS (HCC): Primary | ICD-10-CM

## 2022-12-07 PROCEDURE — G8484 FLU IMMUNIZE NO ADMIN: HCPCS | Performed by: PODIATRIST

## 2022-12-07 PROCEDURE — G8427 DOCREV CUR MEDS BY ELIG CLIN: HCPCS | Performed by: PODIATRIST

## 2022-12-07 PROCEDURE — 99213 OFFICE O/P EST LOW 20 MIN: CPT | Performed by: PODIATRIST

## 2022-12-07 PROCEDURE — 1036F TOBACCO NON-USER: CPT | Performed by: PODIATRIST

## 2022-12-07 PROCEDURE — G8417 CALC BMI ABV UP PARAM F/U: HCPCS | Performed by: PODIATRIST

## 2022-12-07 PROCEDURE — 3017F COLORECTAL CA SCREEN DOC REV: CPT | Performed by: PODIATRIST

## 2022-12-07 PROCEDURE — 90868 TCRANIAL MAGN STIM TX DELI: CPT | Performed by: PSYCHIATRY & NEUROLOGY

## 2022-12-07 NOTE — PROGRESS NOTES
[address].  Dept: [phone]    POST-OP PROGRESS NOTE  Date of patient's visit: 12/7/2022  Patient's Name:  Elena Hearn YOB: 1965            Patient Care Team:  Rosey Funez DO as PCP - General (Family Medicine)  Winnie Bah DPM as Consulting Physician (Podiatry)  Miguel Javed MD as Consulting Physician (Infectious Diseases)        Chief Complaint   Patient presents with    Post-Op Check     Post op x 2 weeks    Results     Discuss mri results       Pt's primary care physician is Rosey Funez DO last seen August 2 2022     Subjective: Elena Hearn is a 62 y.o. female who presents to the office today 2 week(s)  S/P chemical matrixectomy right great toe and left 3rd toe for correction of ingrown toenail  Problem List Items Addressed This Visit    None  Visit Diagnoses       Post-operative state    -  Primary        . Patient relates pain is Absent  and IMPROVED. Pain is rated 0 out of 10 and is described as none. Currently denies F/C/N/V. Is patient taking pain medications as prescribed and is controlling pain no    Physical Examination:  Incision is coapted, sutures/steri-strips are intact. Minimal bleeding post operatively. Edema present. No erythema. No Pus. Operative correction is satisfactory  No pain to the peroneal tendons     MRI reviewed   no peroneal tendon tear no fracture or acute findings     Radiographs: none      Assessment: Soni Bledsoe is status post chemical matrixectomy   Normal post operative course. Doing well          ICD-10-CM    1. Post-operative state  Z98.890             Plan:  Patient examined and evaluated. Current condition and treatment options discussed in detail. Advised pt to her MRI statesment and reivewed the MRI witht he patient . Verbal and written instructions given to patient.     Advised pt to soak for one more week. Keep bandaid on during the day and remove at night for one more week. Pt to come out of the cam boot and can weightbear as tolerated . Instructions given to the pt for ankle strengthening exercises       Non-Weight Bearing Dorsiflexion     Ankle dorsiflexion is the motion of bending your ankle up towards your shin. Gaining this motion can help you regain the ability to walk normally again. Here's how to get more ankle dorsiflexion:   Moving only your ankle, point your foot back toward your nose (while keeping knees straight). Continue until you feel discomfort or can't tilt it back any further. Hold this position for 15 seconds. Return to neutral position and repeat 5 times. Non-Weight Bearing Plantar Flexion     Plantar flexion is the motion of pointing your ankle down and away from you. Here is how to gain ankle plantarflexion range of motion (ROM):   Moving only your ankle, point your foot forward (while keeping knees straight). Continue until you feel discomfort or can't move it any further. Hold this position for 15 seconds. Return to neutral position. Non-Weight Bearing Inversion     Inversion refers to the motion of pointing your ankle inwards towards the midline of your body. Here is how you gain more ankle inversion:   Moving only your ankle and keeping your toes pointed up, turn your foot inward, so the sole is facing your other leg. Continue until either discomfort is felt or you can no longer turn your foot inward. Hold this position for 15 seconds. Return to neutral position. Non-Weight Bearing Eversion     Eversion is the motion of moving your ankle to the outside or lateral part of your leg. Perform this exercise to gain eversion motion in your ankle:   Moving only your ankle and keeping your toes pointed up, turn your foot outward, away from your other leg. Continue until either discomfort is felt or you can no longer turn your foot outward.   Hold this position for 15 seconds. Return to neutral position. The Alphabet   A great way physical therapists help their patients gain ankle mobility in all directions is to perform the ankle alphabet. This can get your ankle moving in all directions. Here is how to do the exercise:   Sit on a chair with your foot dangling in the air or on a bed with your foot hanging off the edge. Draw the alphabet one letter at a time by moving the injured ankle and using the great toe as your \"pencil. \"  Eversion Isometrics     Strengthening exercises are usually started with isometric contractions--no motion occurs around your ankle joint during the muscle contraction. They may be done early after injury or surgery to start to gently--and safely--add force to the muscles that support your ankle. To do the exericse: While seated, place the outside of the injured foot against a table leg or closed door. Push outward with your foot into the object your foot is against (your ankle joint should not move) causing a contraction of your muscles. Hold this muscle contraction for 15 seconds. Relax for 10 seconds. Inversion Isometrics     This isometric exercise focuses on inversion:   While seated, place the inside of the injured foot against a table leg or closed door. Push inward with your foot into the object your foot is against (your ankle joint should not move) causing a contraction of your muscles. Hold this muscle contraction for 15 seconds. Relax for 10 seconds. Resisted Strengthening Dorsiflexion     Resisted strengthening exercises should be performed with a Theraband providing resistance to your movements. These exercises will also work to strengthen the muscles around your ankle. This will provide added support to the joint. Perform each exercise 10 to 15 times in a row. Never tie a Theraband (or anything else) around your foot, ankle, or leg in a way that would restrict blood flow.   Ankle dorsiflexion with resistance helps to strengthen your anterior tibialis muscle. Here is how you do it:   Moving only your ankle, point your foot back toward your nose (while keeping knees straight). Continue until you feel discomfort or can't tilt it back any further. Hold this position for 2 seconds and slowly release. Return to the neutral position, and then repeat the exercise. Resisted Strengthening Plantar Flexion     Resisted ankle plantar flexion helps to strengthen your calf muscles and Achilles tendon. To do the exercise:   Moving only your ankle, point your foot forward (while keeping knees straight). You may feel tightness in your calf muscle behind your lower leg. Continue until you feel discomfort or can't move it any further. Hold this position for 2 seconds. Return to neutral position. Resisted Strengthening Inversion     This exercise will provide strengthening as well:   Moving only your ankle and keeping your toes pointed up, turn your foot inward, so the sole is facing your other leg. Continue until either discomfort is felt or you can no longer turn your foot inward. Hold this position for 2 seconds. Return to neutral position. Resisted Strengthening Eversion     Now strengthen in the other direction:   Moving only your ankle and keeping your toes pointed up, turn your foot outward, away from your other leg. Continue until either discomfort is felt or you can no longer turn your foot outward. Hold this position for 2 seconds. Return to neutral position. Partial Weight-Bearing Seated Calf Raises     These partial weight-bearing exercises will help put more weight on the injured ankle as well as strengthen the muscles around it. Each one should be performed 10 times in a row:   Sit in a chair with the injured foot on the floor. Lift your heel as far as possible while keeping your toes on the floor. Return heel to the floor.   Partial Weight-Bearing Standing Weight Shift     Sometimes after injury, your doctor will have you limit the amount of weight you can put through your lower extremity. This can help protect it as things are healing. As you heal, your PT may guide you in increasing weight bearing through your injured ankle. Weight shifts are the perfect exercise to do for this. To do the exercise:   Stand upright while holding onto a stable object. Shift some of your weight onto the injured foot. Hold the position for 15 seconds. Relax and put your weight back onto your uninjured foot. Full Weight-Bearing Single Leg Stance     These exercises will help put more weight on the injured foot. You should be sure that your ankle can tolerate the pressure that you are putting upon it. Perform each one 10 times in a row:   Stand on the injured foot while lifting the uninjured foot off the ground. Hold the position for 15 seconds. Relax and put your weight back onto your uninjured foot. Checking in with your PT may be necessary to be sure you are doing the right exercises for your ankle. Full Weight-Bearing Standing Calf Raises     Once you are cleared for full weight bearing, you may do these calf raises:   Stand on the injured foot while lifting the uninjured foot off the ground. Raise up, standing only on the ball of the injured foot and lifting your heel off the ground. Hold the position for 15 seconds. Relax and put your weight back onto your uninjured foot. Full Weight-Bearing Lateral Stepping     Increase the speed of this exercise as your healing progresses:   Place a rolled towel or short object on the ground to the side of your injured foot. Step over the towel with the injured foot and remain on that foot. Then bring the uninjured foot over the object and stand on both feet. Step back over the towel with the uninjured foot and remain on that foot. Then bring the injured foot back over the towel and stand on both feet.   Full Weight-Bearing Lateral Jump     This exercise starts to incorporate plyometrics into your rehab routine, which can help you get back to running and sports. Increase the speed of this exercise as your healing progresses:   Place a rolled towel or short object on the ground to the side of your injured foot. Hop over the towel and land on the injured foot. Then hop back over the towel and land on the uninjured foot. Single Leg Stance on a Towel     Injury to ankles can often result in decreased balance ability. 2? Towards the end of rehabilitation, performing balance activities is an important way to prevent future injury. Perform this exercise 10 times in a row: Fold a towel into a small rectangle and place on the ground. Stand with the injured foot on the towel. Lift the uninjured leg off the ground standing only on the towel with the injured leg. Hold for 15 seconds. (As balance improves, increase stance time on injured leg up to 45 seconds.)  Return your uninjured foot to the floor. You can increase the challenge by standing on more unsteady surfaces like a BOSU or wobble board. Your PT may also have you use a BAPS board while working on balance exercises. All labs were reviewed and all imagining including the above findings were reviewed PRIOR to the patients arrival and with the patient today. Previous patient encounter was reviewed. Encounters from the patients other medical providers were reviewed and noted. Time was spent educating the patient and their families/caregivers on proper care of the feet and ankles. All the above diagnosis were addressed at todays visit and all questions were answered. A total of 20 minutes was spent with this patients encounter which included charting after the patients visit    Orders: No orders of the defined types were placed in this encounter. Contact office with any questions/problems/concerns. RTC in 9week(s).      Electronically signed by Xenia Suero DPM on 12/7/2022 at 9:37 AM  12/7/2022

## 2022-12-07 NOTE — PROGRESS NOTES
Transcranial Magnetic Stimulation Procedure  2022    [facility]  1965      Subjective:    PHQ-9 Screenin        Procedure Performed: Transcranial Magnetic Stimulation ([facility]    Procedure Detail: Appropriate protocol was followed to verify the identity of the patient and the correct procedure being performed with the patient's participation and agreement. Informed consent is verified and on file. The optimal treatment parameters as determined during the initial Motor Threshold TMS device are continued. The patient was positioned in the [address] [place], [place] [address] treatment chair for comfort. The MagstAudience TMS Figure 8 treatment coil was placed at the optimal treatment location as determined during the initial Motor Threshold and Treatment Location Determination Procedure, and the treatment parameters for the patient were confirmed. The patient was offered hearing protection and all jewelry and metal was removed. The Magstim [address] [facility] Treatment was delivered. Patient was monitored during the [address] [facility] treatment. Treatment Number: 6    Dose at which the procedure was performed: 85    Patient's response before and after the procedure: Patient tolerated procedure well. No complaints of discomfort before, during, or after treatment. 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 Will repeat depression screening weekly utilizing PHQ-9 to monitor efficacy of treatment. Physician notified after the procedure.

## 2022-12-08 ENCOUNTER — HOSPITAL ENCOUNTER (OUTPATIENT)
Dept: PSYCHIATRY | Age: 57
Setting detail: THERAPIES SERIES
Discharge: HOME | End: 2022-12-08
Payer: COMMERCIAL

## 2022-12-08 DIAGNOSIS — F33.2 MDD (MAJOR DEPRESSIVE DISORDER), RECURRENT SEVERE, WITHOUT PSYCHOSIS (HCC): Primary | ICD-10-CM

## 2022-12-08 PROCEDURE — 90868 TCRANIAL MAGN STIM TX DELI: CPT | Performed by: PSYCHIATRY & NEUROLOGY

## 2022-12-08 NOTE — PROGRESS NOTES
Transcranial Magnetic Stimulation Procedure  2022    Alpheus [place]  1965      Subjective:    PHQ-9 Screenin        Procedure Performed: [facility]cranial Magnetic [facility]ulation ([facility]    Procedure Detail: Appropriate protocol was followed to verify the identity of the patient and the correct procedure being performed with the patient's participation and agreement. Informed consent is verified and on file. The optimal treatment parameters as determined during the initial Motor Threshold TMS device are continued. The patient was positioned in the [address] [place], [place] [address]d treatment chair for comfort. The Magst[employer] TMS Figure 8 treatment coil was placed at the optimal treatment location as determined during the initial Motor Threshold and Treatment Location Determination Procedure, and the treatment parameters for the patient were confirmed. The patient was offered hearing protection and all jewelry and metal was removed. The Magstim [address] [facility] Treatment was delivered. Patient was monitored during the [address] treatment. Treatment Number: 7    Dose at which the procedure was performed: 95    Patient's response before and after the procedure: Patient tolerated procedure well. No complaints of discomfort before, during, or after treatment. 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 Will repeat depression screening weekly utilizing PHQ-9 to monitor efficacy of treatment. Physician notified after the procedure.

## 2022-12-09 ENCOUNTER — HOSPITAL ENCOUNTER (OUTPATIENT)
Dept: PSYCHIATRY | Age: 57
Setting detail: THERAPIES SERIES
Discharge: HOME | End: 2022-12-09
Payer: COMMERCIAL

## 2022-12-09 DIAGNOSIS — F33.2 MDD (MAJOR DEPRESSIVE DISORDER), RECURRENT SEVERE, WITHOUT PSYCHOSIS (HCC): Primary | ICD-10-CM

## 2022-12-09 PROCEDURE — 90868 TCRANIAL MAGN STIM TX DELI: CPT | Performed by: PSYCHIATRY & NEUROLOGY

## 2022-12-09 NOTE — PROGRESS NOTES
Transcranial Magnetic Stimulation Procedure  2022    Leonidas Carrero  1965      Subjective:    PHQ-9 Screenin        Procedure Performed: Transcranial Magnetic Stimulation ([address] [facility])    Procedure Detail: Appropriate protocol was followed to verify the identity of the patient and the correct procedure being performed with the patient's participation and agreement. Informed consent is verified and on file. The optimal treatment parameters as determined during the initial Motor Threshold TMS device are continued. The patient was positioned in the [address] [place], [place] [address] treatment chair for comfort. The MagstWiseStamp TMS Figure 8 treatment coil was placed at the optimal treatment location as determined during the initial Motor Threshold and Treatment Location Determination Procedure, and the treatment parameters for the patient were confirmed. The patient was offered hearing protection and all jewelry and metal was removed. The Magstim [address] Treatment was delivered. Patient was monitored during the [address] treatment. Treatment Number: 8    Dose at which the procedure was performed: 100    Patient's response before and after the procedure: Patient tolerated procedure well. No complaints of discomfort before, during, or after treatment. 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 Will repeat depression screening weekly utilizing PHQ-9 to monitor efficacy of treatment. Physician notified after the procedure.

## 2022-12-12 ENCOUNTER — HOSPITAL ENCOUNTER (OUTPATIENT)
Dept: PSYCHIATRY | Age: 57
Setting detail: THERAPIES SERIES
Discharge: HOME | End: 2022-12-12
Payer: COMMERCIAL

## 2022-12-12 ENCOUNTER — TELEPHONE (OUTPATIENT)
Dept: PSYCHIATRY | Age: 57
End: 2022-12-12

## 2022-12-12 DIAGNOSIS — F33.2 MDD (MAJOR DEPRESSIVE DISORDER), RECURRENT SEVERE, WITHOUT PSYCHOSIS (HCC): Primary | ICD-10-CM

## 2022-12-12 PROCEDURE — 90868 TCRANIAL MAGN STIM TX DELI: CPT | Performed by: PSYCHIATRY & NEUROLOGY

## 2022-12-12 NOTE — TELEPHONE ENCOUNTER
Writer spoke with member of [employer] who was able to extend the expiration date of [address] [facility] treatment.  36 treatments approved from 11/17/2022-02/14/2023 Authorization number [id number]

## 2022-12-12 NOTE — PROGRESS NOTES
Transcranial Magnetic Stimulation Procedure  2022    Amos Sinha  1965      Subjective:    PHQ-9 Screenin        Procedure Performed: Transcranial Magnetic Stimulation ([address] [facility])    Procedure Detail: Appropriate protocol was followed to verify the identity of the patient and the correct procedure being performed with the patient's participation and agreement. Informed consent is verified and on file. The optimal treatment parameters as determined during the initial Motor Threshold TMS device are continued. The patient was positioned in the [facility] for comfort. The MagstMojiva TMS Figure 8 treatment coil was placed at the optimal treatment location as determined during the initial Motor Threshold and Treatment Location Determination Procedure, and the treatment parameters for the patient were confirmed. The patient was offered hearing protection and all jewelry and metal was removed. The Magstim [address] [facility] Treatment was delivered. Patient was monitored during the [address] [facility] treatment. Treatment Number: 9    Dose at which the procedure was performed: 100    Patient's response before and after the procedure: Patient tolerated procedure well. No complaints of discomfort before, during, or after treatment. 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 Will repeat depression screening weekly utilizing PHQ-9 to monitor efficacy of treatment. Physician notified after the procedure.

## 2022-12-13 ENCOUNTER — HOSPITAL ENCOUNTER (OUTPATIENT)
Dept: PSYCHIATRY | Age: 57
Setting detail: THERAPIES SERIES
Discharge: HOME | End: 2022-12-13
Payer: COMMERCIAL

## 2022-12-13 DIAGNOSIS — F33.2 MDD (MAJOR DEPRESSIVE DISORDER), RECURRENT SEVERE, WITHOUT PSYCHOSIS (HCC): Primary | ICD-10-CM

## 2022-12-13 PROCEDURE — 90868 TCRANIAL MAGN STIM TX DELI: CPT | Performed by: PSYCHIATRY & NEUROLOGY

## 2022-12-13 NOTE — PROGRESS NOTES
Transcranial Magnetic Stimulation Procedure  2022    Zhang Erp  1965      Subjective:    PHQ-9 Screenin        Procedure Performed: Transcranial Magnetic Stimulation ([address])    Procedure Detail: Appropriate protocol was followed to verify the identity of the patient and the correct procedure being performed with the patient's participation and agreement. Informed consent is verified and on file. The optimal treatment parameters as determined during the initial Motor Threshold TMS device are continued. The patient was positioned in the [address] [facility] treatment chair for comfort. The MagstMakInnovations TMS Figure 8 treatment coil was placed at the optimal treatment location as determined during the initial Motor Threshold and Treatment Location Determination Procedure, and the treatment parameters for the patient were confirmed. The patient was offered hearing protection and all jewelry and metal was removed. The Magstim [facility] Treatment was delivered. Patient was monitored during the [facility] treatment. Treatment Number: 10    Dose at which the procedure was performed: 100    Patient's response before and after the procedure: Patient tolerated procedure well. No complaints of discomfort before, during, or after treatment. 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 Will repeat depression screening weekly utilizing PHQ-9 to monitor efficacy of treatment. Physician notified after the procedure.

## 2022-12-14 ENCOUNTER — HOSPITAL ENCOUNTER (OUTPATIENT)
Dept: PSYCHIATRY | Age: 57
Setting detail: THERAPIES SERIES
Discharge: HOME | End: 2022-12-14
Payer: COMMERCIAL

## 2022-12-14 DIAGNOSIS — F33.2 MDD (MAJOR DEPRESSIVE DISORDER), RECURRENT SEVERE, WITHOUT PSYCHOSIS (HCC): Primary | ICD-10-CM

## 2022-12-14 PROCEDURE — 90868 TCRANIAL MAGN STIM TX DELI: CPT | Performed by: PSYCHIATRY & NEUROLOGY

## 2022-12-14 NOTE — PROGRESS NOTES
Transcranial Magnetic Stimulation Procedure  2022    Zhang Erp  1965      Subjective:    PHQ-9 Screenin        Procedure Performed: Transcranial Magnetic Stimulation ([address] [facility])    Procedure Detail: Appropriate protocol was followed to verify the identity of the patient and the correct procedure being performed with the patient's participation and agreement. Informed consent is verified and on file. The optimal treatment parameters as determined during the initial Motor Threshold TMS device are continued. The patient was positioned in the [address] treatment chair for comfort. The MagstGameHuddle TMS Figure 8 treatment coil was placed at the optimal treatment location as determined during the initial Motor Threshold and Treatment Location Determination Procedure, and the treatment parameters for the patient were confirmed. The patient was offered hearing protection and all jewelry and metal was removed. The Magstim [address] [facility] Treatment was delivered. Patient was monitored during the [address] treatment. Treatment Number: 11    Dose at which the procedure was performed: 100    Patient's response before and after the procedure: Patient tolerated procedure well. No complaints of discomfort before, during, or after treatment. 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 Will repeat depression screening weekly utilizing PHQ-9 to monitor efficacy of treatment. Physician notified after the procedure.

## 2022-12-15 ENCOUNTER — HOSPITAL ENCOUNTER (OUTPATIENT)
Dept: PSYCHIATRY | Age: 57
Setting detail: THERAPIES SERIES
Discharge: HOME | End: 2022-12-15
Payer: COMMERCIAL

## 2022-12-15 DIAGNOSIS — F33.2 MDD (MAJOR DEPRESSIVE DISORDER), RECURRENT SEVERE, WITHOUT PSYCHOSIS (HCC): Primary | ICD-10-CM

## 2022-12-15 PROCEDURE — 90868 TCRANIAL MAGN STIM TX DELI: CPT | Performed by: PSYCHIATRY & NEUROLOGY

## 2022-12-16 ENCOUNTER — HOSPITAL ENCOUNTER (OUTPATIENT)
Dept: PSYCHIATRY | Age: 57
Setting detail: THERAPIES SERIES
Discharge: HOME | End: 2022-12-16
Payer: COMMERCIAL

## 2022-12-16 DIAGNOSIS — F33.2 MDD (MAJOR DEPRESSIVE DISORDER), RECURRENT SEVERE, WITHOUT PSYCHOSIS (HCC): Primary | ICD-10-CM

## 2022-12-16 PROCEDURE — 90868 TCRANIAL MAGN STIM TX DELI: CPT | Performed by: PSYCHIATRY & NEUROLOGY

## 2022-12-16 NOTE — PROGRESS NOTES
Transcranial Magnetic Stimulation Procedure  2022    Michi Barley  1965      Subjective:    PHQ-9 Screenin        Procedure Performed: Transcranial Magnetic Stimulation ([address] [facility] [place])    Procedure Detail: Appropriate protocol was followed to verify the identity of the patient and the correct procedure being performed with the patient's participation and agreement. Informed consent is verified and on file. The optimal treatment parameters as determined during the initial Motor Threshold TMS device are continued. The patient was [facility] the [facility] for comfort. The MagstAileron [employer] TMS Figure 8 treatment coil was placed at the optimal treatment location as determined during the initial Motor Threshold and Treatment Location Determination Procedure, and the treatment parameters for the patient were confirmed. The patient was offered hearing protection and all jewelry and metal was removed. The Magstim [address] [facility] Treatment was delivered. Patient was monitored during the [address] [facility] treatment. Treatment Number: 12    Dose at which the procedure was performed: 100    Patient's response before and after the procedure: Patient tolerated procedure well. No complaints of discomfort before, during, or after treatment. 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 Will repeat depression screening weekly utilizing PHQ-9 to monitor efficacy of treatment. Physician notified after the procedure.

## 2022-12-20 ENCOUNTER — HOSPITAL ENCOUNTER (OUTPATIENT)
Dept: PSYCHIATRY | Age: 57
Setting detail: THERAPIES SERIES
Discharge: HOME | End: 2022-12-20
Payer: COMMERCIAL

## 2022-12-20 DIAGNOSIS — F33.2 MDD (MAJOR DEPRESSIVE DISORDER), RECURRENT SEVERE, WITHOUT PSYCHOSIS (HCC): Primary | ICD-10-CM

## 2022-12-20 DIAGNOSIS — S86.312D PERONEAL TENDON TEAR, LEFT, SUBSEQUENT ENCOUNTER: Primary | ICD-10-CM

## 2022-12-20 PROCEDURE — 90868 TCRANIAL MAGN STIM TX DELI: CPT | Performed by: PSYCHIATRY & NEUROLOGY

## 2022-12-20 NOTE — PROGRESS NOTES
Transcranial Magnetic Stimulation Procedure  2022    Kayla Lies  1965      Subjective:    PHQ-9 Screenin        Procedure Performed: Transcranial Magnetic Stimulation ([address] [facility] [place])    Procedure Detail: Appropriate protocol was followed to verify the identity of the patient and the correct procedure being performed with the patient's participation and agreement. Informed consent is verified and on file. The optimal treatment parameters as determined during the initial Motor Threshold TMS device are continued. The patient was positioned in the [facility]. The MagstWellAWARE Systems TMS Figure 8 treatment coil was placed at the optimal treatment location as determined during the initial Motor Threshold and Treatment Location Determination Procedure, and the treatment parameters for the patient were confirmed. The patient was offered hearing protection and all jewelry and metal was removed. The Magstim [address] [facility] [place] Treatment was delivered. Patient was monitored during the [address] [facility] treatment. Treatment Number: 13    Dose at which the procedure was performed: 100    Patient's response before and after the procedure: Patient tolerated procedure well. No complaints of discomfort before, during, or after treatment. 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 Will repeat depression screening weekly utilizing PHQ-9 to monitor efficacy of treatment. Physician notified after the procedure.

## 2022-12-21 ENCOUNTER — HOSPITAL ENCOUNTER (OUTPATIENT)
Dept: PSYCHIATRY | Age: 57
Setting detail: THERAPIES SERIES
Discharge: HOME | End: 2022-12-21
Payer: COMMERCIAL

## 2022-12-21 DIAGNOSIS — F33.2 MDD (MAJOR DEPRESSIVE DISORDER), RECURRENT SEVERE, WITHOUT PSYCHOSIS (HCC): Primary | ICD-10-CM

## 2022-12-21 PROCEDURE — 90868 TCRANIAL MAGN STIM TX DELI: CPT | Performed by: PSYCHIATRY & NEUROLOGY

## 2022-12-21 NOTE — PROGRESS NOTES
Transcranial Magnetic Stimulation Procedure  2022    Lashae Sotelo  1965      Subjective:    PHQ-9 Screenin        Procedure Performed: Transcranial Magnetic Stimulation ([address] [facility] [place])    Procedure Detail: Appropriate protocol was followed to verify the identity of the patient and the correct procedure being performed with the patient's participation and agreement. Informed consent is verified and on file. The optimal treatment parameters as determined during the initial Motor Threshold TMS device are continued. The patient was positioned in the [address] [place], [place] [address]vard treatment chair for comfort. The Magst[employer] TMS Figure 8 treatment coil was placed at the optimal treatment location as determined during the initial Motor Threshold and Treatment Location Determination Procedure, and the treatment parameters for the patient were confirmed. The patient was offered hearing protection and all jewelry and metal was removed. The Magstim [address] [facility] [place] Treatment was delivered. Patient was monitored during the [address] [facility] treatment. Treatment Number: 14    Dose at which the procedure was performed: 100    Patient's response before and after the procedure: Patient tolerated procedure well. No complaints of discomfort before, during, or after treatment. 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 Will repeat depression screening weekly utilizing PHQ-9 to monitor efficacy of treatment. Physician notified after the procedure.

## 2022-12-22 ENCOUNTER — HOSPITAL ENCOUNTER (OUTPATIENT)
Dept: PSYCHIATRY | Age: 57
Setting detail: THERAPIES SERIES
Discharge: HOME | End: 2022-12-22
Payer: COMMERCIAL

## 2022-12-22 DIAGNOSIS — F33.2 MDD (MAJOR DEPRESSIVE DISORDER), RECURRENT SEVERE, WITHOUT PSYCHOSIS (HCC): Primary | ICD-10-CM

## 2022-12-22 PROCEDURE — 90868 TCRANIAL MAGN STIM TX DELI: CPT | Performed by: PSYCHIATRY & NEUROLOGY

## 2022-12-23 ENCOUNTER — APPOINTMENT (OUTPATIENT)
Dept: PSYCHIATRY | Age: 57
End: 2022-12-23
Payer: COMMERCIAL

## 2022-12-27 ENCOUNTER — HOSPITAL ENCOUNTER (OUTPATIENT)
Dept: PSYCHIATRY | Age: 57
Setting detail: THERAPIES SERIES
Discharge: HOME | End: 2022-12-27
Payer: COMMERCIAL

## 2022-12-27 DIAGNOSIS — F33.2 MDD (MAJOR DEPRESSIVE DISORDER), RECURRENT SEVERE, WITHOUT PSYCHOSIS (HCC): Primary | ICD-10-CM

## 2022-12-27 PROCEDURE — 90868 TCRANIAL MAGN STIM TX DELI: CPT | Performed by: PSYCHIATRY & NEUROLOGY

## 2022-12-27 NOTE — PROGRESS NOTES
Transcranial Magnetic Stimulation Procedure  2022    Angelita Zamora  1965      Subjective:    PHQ-9 Screenin        Procedure Performed: Transcranial Magnetic Stimulation ([address] [facility])    Procedure Detail: Appropriate protocol was followed to verify the identity of the patient and the correct procedure being performed with the patient's participation and agreement. Informed consent is verified and on file. The optimal treatment parameters as determined during the initial Motor Threshold TMS device are continued. The patient was positioned in the [address] [place], [place] [address]vard treatment chair for comfort. The MagstLightningcast TMS Figure 8 treatment coil was placed at the optimal treatment location as determined during the initial Motor Threshold and Treatment Location Determination Procedure, and the treatment parameters for the patient were confirmed. The patient was offered hearing protection and all jewelry and metal was removed. The Magstim [address] [facility] Treatment was delivered. Patient was monitored during the [address] [facility] treatment. Treatment Number: 15    Dose at which the procedure was performed: 100    Patient's response before and after the procedure: Patient tolerated procedure well. No complaints of discomfort before, during, or after treatment. 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 Will repeat depression screening weekly utilizing PHQ-9 to monitor efficacy of treatment. Physician notified after the procedure.

## 2022-12-29 ENCOUNTER — HOSPITAL ENCOUNTER (OUTPATIENT)
Dept: PSYCHIATRY | Age: 57
Setting detail: THERAPIES SERIES
Discharge: HOME | End: 2022-12-29
Payer: COMMERCIAL

## 2022-12-29 PROCEDURE — 90868 TCRANIAL MAGN STIM TX DELI: CPT | Performed by: PSYCHIATRY & NEUROLOGY

## 2022-12-29 NOTE — PROGRESS NOTES
Transcranial Magnetic Stimulation Procedure  2022    Alpheus   1965      Subjective:    PHQ-9 Screenin        Procedure Performed: Transcranial Magnetic Stimulation ([address]    Procedure Detail: Appropriate protocol was followed to verify the identity of the patient and the correct procedure being performed with the patient's participation and agreement. Informed consent is verified and on file. The optimal treatment parameters as determined during the initial Motor Threshold TMS device are continued. The patient was positioned in the [address] [place], [place] [address] treatment chair for comfort. The MagstGenesys Systems TMS Figure 8 treatment coil was placed at the optimal treatment location as determined during the initial Motor Threshold and Treatment Location Determination Procedure, and the treatment parameters for the patient were confirmed. The patient was offered hearing protection and all jewelry and metal was removed. The Magstim [address] [facility] Treatment was delivered. Patient was monitored during the [address] [facility] treatment. Treatment Number: 16    Dose at which the procedure was performed: 100    Patient's response before and after the procedure: Patient tolerated procedure well. No complaints of discomfort before, during, or after treatment. 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 Will repeat depression screening weekly utilizing PHQ-9 to monitor efficacy of treatment. Physician notified after the procedure.

## 2022-12-30 ENCOUNTER — HOSPITAL ENCOUNTER (OUTPATIENT)
Dept: PSYCHIATRY | Age: 57
Setting detail: THERAPIES SERIES
Discharge: HOME | End: 2022-12-30
Payer: COMMERCIAL

## 2022-12-30 PROCEDURE — 90868 TCRANIAL MAGN STIM TX DELI: CPT | Performed by: PSYCHIATRY & NEUROLOGY

## 2022-12-30 NOTE — PROGRESS NOTES
Transcranial Magnetic Stimulation Procedure  2022    Stacie Monroy  1965      Subjective:    PHQ-9 Screenin        Procedure Performed: Transcranial Magnetic Stimulation ([address] [facility] [place])    Procedure Detail: Appropriate protocol was followed to verify the identity of the patient and the correct procedure being performed with the patient's participation and agreement. Informed consent is verified and on file. The optimal treatment parameters as determined during the initial Motor Threshold TMS device are continued. The patient was positioned in the [address] [place], [place] [address]vard treatment chair for comfort. The MagstHappyFactory TMS Figure 8 treatment coil was placed at the optimal treatment location as determined during the initial Motor Threshold and Treatment Location Determination Procedure, and the treatment parameters for the patient were confirmed. The patient was offered hearing protection and all jewelry and metal was removed. The Magstim [address] [facility] [place] Treatment was delivered. Patient was monitored during the [address] [facility] [place] treatment. Treatment Number: 17    Dose at which the procedure was performed: 100      Patient's response before and after the procedure: Patient tolerated procedure well. No complaints of discomfort before, during, or after treatment. 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 Will repeat depression screening weekly utilizing PHQ-9 to monitor efficacy of treatment. Physician notified after the procedure.

## 2022-12-31 NOTE — PROGRESS NOTES
Progress Note                           Transcranial Magnetic Stimulation Procedure      Procedure Performed: Transcranial Magnetic Stimulation (TMS)      Procedure Detail: The optimal treatment parameters as determined during the initial Motor Threshold and Treatment Location Determination Procedure were entered into the MagstFredio TMS device. The patient was positioned in the [address] [place] [address] treatment chair. The Magstim TMS treatment coil was placed at the optimal treatment location as determined during the initial Motor Threshold and Treatment Location Determination Procedure, and the treatment parameters for the patient were confirmed. The Wings Intellectim [address] [facility] Treatment was delivered. Patient was monitored during [address] [facility] treatment  . Subjective:   Patient tolerated procedure well. No issues today, denied any side effects after the procedure. Target Power Level (120% of MT) = 100  Power Level Achieved for this [address] [facility] treatment = 100     Assessment:   MDD recurrent severe without pscyhosis     Plan:   Will continue at the current therapeutic dose  Will continue to follow up with her

## 2022-12-31 NOTE — PROGRESS NOTES
Progress Note                           Transcranial Magnetic Stimulation Procedure      Procedure Performed: Transcranial Magnetic Stimulation (TMS)      Procedure Detail: The optimal treatment parameters as determined during the initial Motor Threshold and Treatment Location Determination Procedure were entered into the MagstFuturelytics TMS device. The patient was positioned in the [address] [place] [address] treatment chair. The Magstim TMS treatment coil was placed at the optimal treatment location as determined during the initial Motor Threshold and Treatment Location Determination Procedure, and the treatment parameters for the patient were confirmed. The AcceloWebim [address] [facility] [place] Treatment was delivered. Patient was monitored during [address] [facility] treatment  . Subjective:   Patient tolerated procedure well. No issues today, denied any side effects after the procedure. Target Power Level (120% of MT) = 100  Power Level Achieved for this [address] [facility] [place] treatment = 100     Assessment:   MDD recurrent severe without pscyhosis     Plan:   Will continue at the current therapeutic dose  Will continue to follow up with her

## 2022-12-31 NOTE — PROGRESS NOTES
Progress Note                           Transcranial Magnetic Stimulation Procedure      Procedure Performed: Transcranial Magnetic Stimulation (TMS)      Procedure Detail: The optimal treatment parameters as determined during the initial Motor Threshold and Treatment Location Determination Procedure were entered into the Magst[employer] TMS device. The patient was positioned in the [address] [place] [address] treatment chair. The Magstim TMS treatment coil was placed at the optimal treatment location as determined during the initial Motor Threshold and Treatment Location Determination Procedure, and the treatment parameters for the patient were confirmed. The Agitarim [address] [facility] Treatment was delivered. Patient was monitored during [address] [facility] treatment  . Subjective:   Patient tolerated procedure well. No issues today, denied any side effects after the procedure. Target Power Level (120% of MT) = 100  Power Level Achieved for this [address] [facility] [place] treatment = 85     Assessment:   MDD recurrent severe without pscyhosis     Plan:   Will continue at the current therapeutic dose  Will continue to follow up with her

## 2022-12-31 NOTE — PROGRESS NOTES
Progress Note                           Transcranial Magnetic Stimulation Procedure      Procedure Performed: Transcranial Magnetic Stimulation (TMS)      Procedure Detail: The optimal treatment parameters as determined during the initial Motor Threshold and Treatment Location Determination Procedure were entered into the MagstBidgely TMS device. The patient was positioned in the [address] [place] [address] treatment chair. The Magstim TMS treatment coil was placed at the optimal treatment location as determined during the initial Motor Threshold and Treatment Location Determination Procedure, and the treatment parameters for the patient were confirmed. The KoolSpanim [address] [facility] Treatment was delivered. Patient was monitored during [address] [facility] treatment  . Subjective:   Patient tolerated procedure well. No issues today, denied any side effects after the procedure. Target Power Level (120% of MT) = 100  Power Level Achieved for this [address] [facility] treatment = 100     Assessment:   MDD recurrent severe without pscyhosis     Plan:   Will continue at the current therapeutic dose  Will continue to follow up with her

## 2022-12-31 NOTE — PROGRESS NOTES
Progress Note                           Transcranial Magnetic Stimulation Procedure      Procedure Performed: Transcranial Magnetic Stimulation (TMS)      Procedure Detail: The optimal treatment parameters as determined during the initial Motor Threshold and Treatment Location Determination Procedure were entered into the MagstAuthix Tecnologies TMS device. The patient was positioned in the [address] [place] [address] treatment chair. The Magstim TMS treatment coil was placed at the optimal treatment location as determined during the initial Motor Threshold and Treatment Location Determination Procedure, and the treatment parameters for the patient were confirmed. The Proximetryim [address] [facility] [place] Treatment was delivered. Patient was monitored during [address] [facility] treatment  . Subjective:   Patient tolerated procedure well. No issues today, denied any side effects after the procedure. Target Power Level (120% of MT) = 100  Power Level Achieved for this [address] [facility] [place] treatment = 100     Assessment:   MDD recurrent severe without pscyhosis     Plan:   Will continue at the current therapeutic dose  Will continue to follow up with her

## 2022-12-31 NOTE — PROGRESS NOTES
Progress Note                           Transcranial Magnetic Stimulation Procedure      Procedure Performed: Transcranial Magnetic Stimulation (TMS)      Procedure Detail: The optimal treatment parameters as determined during the initial Motor Threshold and Treatment Location Determination Procedure were entered into the MagstSilverback Learning Solutions TMS device. The patient was positioned in the [address] [place] [address] treatment chair. The Magstim TMS treatment coil was placed at the optimal treatment location as determined during the initial Motor Threshold and Treatment Location Determination Procedure, and the treatment parameters for the patient were confirmed. The Gyrosim [address] [facility] Treatment was delivered. Patient was monitored during [address] [facility] treatment  . Subjective:   Patient tolerated procedure well. No issues today, denied any side effects after the procedure. Target Power Level (120% of MT) = 100  Power Level Achieved for this [address] [facility] treatment = 100     Assessment:   MDD recurrent severe without pscyhosis     Plan:   Will continue at the current therapeutic dose  Will continue to follow up with her

## 2022-12-31 NOTE — PROGRESS NOTES
Progress Note                           Transcranial Magnetic Stimulation Procedure      Procedure Performed: Transcranial Magnetic Stimulation (TMS)      Procedure Detail: The optimal treatment parameters as determined during the initial Motor Threshold and Treatment Location Determination Procedure were entered into the MagstSET TMS device. The patient was positioned in the [address] [place] [address] treatment chair. The Magstim TMS treatment coil was placed at the optimal treatment location as determined during the initial Motor Threshold and Treatment Location Determination Procedure, and the treatment parameters for the patient were confirmed. The gridCommim [address] [facility] [place] Treatment was delivered. Patient was monitored during [address] [facility] [place] treatment  . Subjective:   Patient tolerated procedure well. No issues today, denied any side effects after the procedure. Target Power Level (120% of MT) = 100  Power Level Achieved for this [address] [facility] [place] treatment = 55     Assessment:   MDD recurrent severe without pscyhosis     Plan:   Will continue at the current therapeutic dose  Will continue to follow up with her

## 2022-12-31 NOTE — PROGRESS NOTES
Progress Note                           Transcranial Magnetic Stimulation Procedure      Procedure Performed: Transcranial Magnetic Stimulation (TMS)      Procedure Detail: The optimal treatment parameters as determined during the initial Motor Threshold and Treatment Location Determination Procedure were entered into the MagstThe [employer] Group TMS device. The patient was positioned in the [address] [place] [address] treatment chair. The Magstim TMS treatment coil was placed at the optimal treatment location as determined during the initial Motor Threshold and Treatment Location Determination Procedure, and the treatment parameters for the patient were confirmed. The Kiddies Smilzim [address] [facility] Treatment was delivered. Patient was monitored during [address] [facility] treatment  . Subjective:   Patient tolerated procedure well. No issues today, denied any side effects after the procedure. Target Power Level (120% of MT) = 100  Power Level Achieved for this [address] [facility] treatment = 100     Assessment:   MDD recurrent severe without pscyhosis     Plan:   Will continue at the current therapeutic dose  Will continue to follow up with her

## 2022-12-31 NOTE — PROGRESS NOTES
Progress Note                           Transcranial Magnetic Stimulation Procedure      Procedure Performed: Transcranial Magnetic Stimulation (TMS)      Procedure Detail: The optimal treatment parameters as determined during the initial Motor Threshold and Treatment Location Determination Procedure were entered into the MagstFonmatch TMS device. The patient was positioned in the [address] [place] [address] treatment chair. The Magstim TMS treatment coil was placed at the optimal treatment location as determined during the initial Motor Threshold and Treatment Location Determination Procedure, and the treatment parameters for the patient were confirmed. The Club Pointim [address] [facility] [place] Treatment was delivered. Patient was monitored during [address] [facility] treatment  . Subjective:   Patient tolerated procedure well. No issues today, denied any side effects after the procedure. Target Power Level (120% of MT) = 100  Power Level Achieved for this [address] [facility] treatment = 100     Assessment:   MDD recurrent severe without pscyhosis     Plan:   Will continue at the current therapeutic dose  Will continue to follow up with her

## 2022-12-31 NOTE — PROGRESS NOTES
Progress Note                           Transcranial Magnetic Stimulation Procedure      Procedure Performed: Transcranial Magnetic Stimulation (TMS)      Procedure Detail: The optimal treatment parameters as determined during the initial Motor Threshold and Treatment Location Determination Procedure were entered into the MagstFamely TMS device. The patient was positioned in the [address] [place] [address] treatment chair. The Magstim TMS treatment coil was placed at the optimal treatment location as determined during the initial Motor Threshold and Treatment Location Determination Procedure, and the treatment parameters for the patient were confirmed. The Next Level Security Systemsim [address] [facility] Treatment was delivered. Patient was monitored during [address] [facility] treatment  . Subjective:   Patient tolerated procedure well. No issues today, denied any side effects after the procedure. Target Power Level (120% of MT) = 100  Power Level Achieved for this [address] [facility] treatment = 100     Assessment:   MDD recurrent severe without pscyhosis     Plan:   Will continue at the current therapeutic dose  Will continue to follow up with her

## 2022-12-31 NOTE — PROGRESS NOTES
Progress Note                           Transcranial Magnetic Stimulation Procedure      Procedure Performed: Transcranial Magnetic Stimulation (TMS)      Procedure Detail: The optimal treatment parameters as determined during the initial Motor Threshold and Treatment Location Determination Procedure were entered into the [employer] TMS device. The patient was positioned in the [address] [place] [address] treatment chair. The Magstim TMS treatment coil was placed at the optimal treatment location as determined during the initial Motor Threshold and Treatment Location Determination Procedure, and the treatment parameters for the patient were confirmed. The [employer] [address] [facility] Treatment was delivered. Patient was monitored during [address] [facility] treatment  . Subjective:   Patient tolerated procedure well. No issues today, denied any side effects after the procedure. Target Power Level (120% of MT) = 100  Power Level Achieved for this [address] [facility] treatment = 95     Assessment:   MDD recurrent severe without pscyhosis     Plan:   Will continue at the current therapeutic dose  Will continue to follow up with her

## 2022-12-31 NOTE — PROGRESS NOTES
Progress Note                           Transcranial Magnetic Stimulation Procedure      Procedure Performed: Transcranial Magnetic Stimulation (TMS)      Procedure Detail: The optimal treatment parameters as determined during the initial Motor Threshold and Treatment Location Determination Procedure were entered into the [employer] TMS device. The patient was positioned in the [address] [place] [address] treatment chair. The Magstim TMS treatment coil was placed at the optimal treatment location as determined during the initial Motor Threshold and Treatment Location Determination Procedure, and the treatment parameters for the patient were confirmed. The [employer] [address] [facility] Treatment was delivered. Patient was monitored during [address] [facility] treatment  . Subjective:   Patient tolerated procedure well. No issues today, denied any side effects after the procedure. Target Power Level (120% of MT) = 100  Power Level Achieved for this [address] [facility] treatment = 100     Assessment:   MDD recurrent severe without pscyhosis     Plan:   Will continue at the current therapeutic dose  Will continue to follow up with her

## 2022-12-31 NOTE — PROGRESS NOTES
Progress Note                           Transcranial Magnetic Stimulation Procedure      Procedure Performed: Transcranial Magnetic Stimulation (TMS)      Procedure Detail: The optimal treatment parameters as determined during the initial Motor Threshold and Treatment Location Determination Procedure were entered into the [employer] TMS device. The patient was positioned in the [address] [place] [address] treatment chair. The Magstim TMS treatment coil was placed at the optimal treatment location as determined during the initial Motor Threshold and Treatment Location Determination Procedure, and the treatment parameters for the patient were confirmed. The [employer] [address] [facility] Treatment was delivered. Patient was monitored during [address] [facility] treatment  . Subjective:   Patient tolerated procedure well. No issues today, denied any side effects after the procedure. Target Power Level (120% of MT) = 100  Power Level Achieved for this [address] [facility] treatment = 65     Assessment:   MDD recurrent severe without pscyhosis     Plan:   Will continue at the current therapeutic dose  Will continue to follow up with her

## 2022-12-31 NOTE — PROGRESS NOTES
Progress Note                           Transcranial Magnetic Stimulation Procedure      Procedure Performed: Transcranial Magnetic Stimulation (TMS)      Procedure Detail: The optimal treatment parameters as determined during the initial Motor Threshold and Treatment Location Determination Procedure were entered into the [employer] TMS device. The patient was positioned in the [address] [place] [address] treatment chair. The Magstim TMS treatment coil was placed at the optimal treatment location as determined during the initial Motor Threshold and Treatment Location Determination Procedure, and the treatment parameters for the patient were confirmed. The [employer] [address] [facility] Treatment was delivered. Patient was monitored during [address] [facility] treatment  . Subjective:   Patient tolerated procedure well. No issues today, denied any side effects after the procedure. Target Power Level (120% of MT) = 100  Power Level Achieved for this [address] [facility] treatment = 100     Assessment:   MDD recurrent severe without pscyhosis     Plan:   Will continue at the current therapeutic dose  Will continue to follow up with her

## 2022-12-31 NOTE — PROGRESS NOTES
Progress Note                           Transcranial Magnetic Stimulation Procedure      Procedure Performed: Transcranial Magnetic Stimulation (TMS)      Procedure Detail: The optimal treatment parameters as determined during the initial Motor Threshold and Treatment Location Determination Procedure were entered into the [employer] TMS device. The patient was positioned in the [address] [place] [address] treatment chair. The Magstim TMS treatment coil was placed at the optimal treatment location as determined during the initial Motor Threshold and Treatment Location Determination Procedure, and the treatment parameters for the patient were confirmed. The [employer] [place] [address] [facility] Treatment was delivered. Patient was monitored during [address] [facility] treatment  . Subjective:   Patient tolerated procedure well. No issues today, denied any side effects after the procedure. Target Power Level (120% of MT) = 100  Power Level Achieved for this [address] [facility] treatment = 75     Assessment:   MDD recurrent severe without pscyhosis     Plan:   Will continue at the current therapeutic dose  Will continue to follow up with her

## 2024-01-19 ENCOUNTER — HOSPITAL ENCOUNTER (INPATIENT)
Age: 59
LOS: 5 days | Discharge: HOME | DRG: 885 | End: 2024-01-24
Attending: EMERGENCY MEDICINE | Admitting: PSYCHIATRY & NEUROLOGY
Payer: COMMERCIAL

## 2024-01-19 DIAGNOSIS — F23 ACUTE PSYCHOSIS (HCC): Primary | ICD-10-CM

## 2024-01-19 PROCEDURE — 99285 EMERGENCY DEPT VISIT HI MDM: CPT

## 2024-01-19 PROCEDURE — 80307 DRUG TEST PRSMV CHEM ANLYZR: CPT

## 2024-01-19 PROCEDURE — 1240000000 HC EMOTIONAL WELLNESS R&B

## 2024-01-19 PROCEDURE — 6370000000 HC RX 637 (ALT 250 FOR IP): Performed by: NURSE PRACTITIONER

## 2024-01-19 PROCEDURE — 81001 URINALYSIS AUTO W/SCOPE: CPT

## 2024-01-19 RX ORDER — AMOXICILLIN AND CLAVULANATE POTASSIUM 875; 125 MG/1; MG/1
1 TABLET, FILM COATED ORAL EVERY 12 HOURS SCHEDULED
Status: DISCONTINUED | OUTPATIENT
Start: 2024-01-19 | End: 2024-01-24 | Stop reason: HOSPADM

## 2024-01-19 RX ORDER — IPRATROPIUM BROMIDE AND ALBUTEROL SULFATE 2.5; .5 MG/3ML; MG/3ML
1 SOLUTION RESPIRATORY (INHALATION)
Status: DISCONTINUED | OUTPATIENT
Start: 2024-01-19 | End: 2024-01-24 | Stop reason: HOSPADM

## 2024-01-19 RX ORDER — POLYETHYLENE GLYCOL 3350 17 G/17G
17 POWDER, FOR SOLUTION ORAL DAILY PRN
Status: DISCONTINUED | OUTPATIENT
Start: 2024-01-19 | End: 2024-01-24 | Stop reason: HOSPADM

## 2024-01-19 RX ORDER — HALOPERIDOL 5 MG/1
5 TABLET ORAL EVERY 6 HOURS PRN
Status: DISCONTINUED | OUTPATIENT
Start: 2024-01-19 | End: 2024-01-24 | Stop reason: HOSPADM

## 2024-01-19 RX ORDER — TRAZODONE HYDROCHLORIDE 50 MG/1
50 TABLET ORAL NIGHTLY PRN
Status: DISCONTINUED | OUTPATIENT
Start: 2024-01-19 | End: 2024-01-24 | Stop reason: HOSPADM

## 2024-01-19 RX ORDER — MAGNESIUM HYDROXIDE/ALUMINUM HYDROXICE/SIMETHICONE 120; 1200; 1200 MG/30ML; MG/30ML; MG/30ML
30 SUSPENSION ORAL EVERY 6 HOURS PRN
Status: DISCONTINUED | OUTPATIENT
Start: 2024-01-19 | End: 2024-01-24 | Stop reason: HOSPADM

## 2024-01-19 RX ORDER — DIPHENHYDRAMINE HYDROCHLORIDE 50 MG/ML
50 INJECTION INTRAMUSCULAR; INTRAVENOUS EVERY 6 HOURS PRN
Status: DISCONTINUED | OUTPATIENT
Start: 2024-01-19 | End: 2024-01-24 | Stop reason: HOSPADM

## 2024-01-19 RX ORDER — HALOPERIDOL 5 MG/ML
5 INJECTION INTRAMUSCULAR EVERY 6 HOURS PRN
Status: DISCONTINUED | OUTPATIENT
Start: 2024-01-19 | End: 2024-01-24 | Stop reason: HOSPADM

## 2024-01-19 RX ORDER — HYDROXYZINE 50 MG/1
50 TABLET, FILM COATED ORAL 3 TIMES DAILY PRN
Status: DISCONTINUED | OUTPATIENT
Start: 2024-01-19 | End: 2024-01-24 | Stop reason: HOSPADM

## 2024-01-19 RX ORDER — ACETAMINOPHEN 325 MG/1
650 TABLET ORAL EVERY 4 HOURS PRN
Status: DISCONTINUED | OUTPATIENT
Start: 2024-01-19 | End: 2024-01-24 | Stop reason: HOSPADM

## 2024-01-19 RX ORDER — ONDANSETRON 4 MG/1
4 TABLET, ORALLY DISINTEGRATING ORAL EVERY 8 HOURS PRN
Status: DISCONTINUED | OUTPATIENT
Start: 2024-01-19 | End: 2024-01-20 | Stop reason: SDUPTHER

## 2024-01-19 RX ADMIN — AMOXICILLIN AND CLAVULANATE POTASSIUM 1 TABLET: 875; 125 TABLET, FILM COATED ORAL at 23:39

## 2024-01-19 ASSESSMENT — PATIENT HEALTH QUESTIONNAIRE - PHQ9
SUM OF ALL RESPONSES TO PHQ QUESTIONS 1-9: 0
1. LITTLE INTEREST OR PLEASURE IN DOING THINGS: 0
SUM OF ALL RESPONSES TO PHQ9 QUESTIONS 1 & 2: 0
SUM OF ALL RESPONSES TO PHQ QUESTIONS 1-9: 0
2. FEELING DOWN, DEPRESSED OR HOPELESS: 0

## 2024-01-19 ASSESSMENT — SLEEP AND FATIGUE QUESTIONNAIRES
DO YOU USE A SLEEP AID: NO
AVERAGE NUMBER OF SLEEP HOURS: 5
SLEEP PATTERN: DIFFICULTY FALLING ASLEEP;DISTURBED/INTERRUPTED SLEEP;RESTLESSNESS
DO YOU HAVE DIFFICULTY SLEEPING: YES

## 2024-01-19 ASSESSMENT — LIFESTYLE VARIABLES
HOW OFTEN DO YOU HAVE A DRINK CONTAINING ALCOHOL: NEVER
HOW OFTEN DO YOU HAVE A DRINK CONTAINING ALCOHOL: NEVER
HOW MANY STANDARD DRINKS CONTAINING ALCOHOL DO YOU HAVE ON A TYPICAL DAY: PATIENT DOES NOT DRINK
HOW MANY STANDARD DRINKS CONTAINING ALCOHOL DO YOU HAVE ON A TYPICAL DAY: PATIENT DOES NOT DRINK

## 2024-01-19 ASSESSMENT — PAIN - FUNCTIONAL ASSESSMENT: PAIN_FUNCTIONAL_ASSESSMENT: NONE - DENIES PAIN

## 2024-01-20 PROCEDURE — 99223 1ST HOSP IP/OBS HIGH 75: CPT | Performed by: PSYCHIATRY & NEUROLOGY

## 2024-01-20 PROCEDURE — APPSS60 APP SPLIT SHARED TIME 46-60 MINUTES

## 2024-01-20 PROCEDURE — 1240000000 HC EMOTIONAL WELLNESS R&B

## 2024-01-20 PROCEDURE — 94761 N-INVAS EAR/PLS OXIMETRY MLT: CPT

## 2024-01-20 PROCEDURE — 6370000000 HC RX 637 (ALT 250 FOR IP): Performed by: NURSE PRACTITIONER

## 2024-01-20 PROCEDURE — 99222 1ST HOSP IP/OBS MODERATE 55: CPT | Performed by: INTERNAL MEDICINE

## 2024-01-20 PROCEDURE — 94640 AIRWAY INHALATION TREATMENT: CPT

## 2024-01-20 PROCEDURE — 6370000000 HC RX 637 (ALT 250 FOR IP): Performed by: PSYCHIATRY & NEUROLOGY

## 2024-01-20 RX ORDER — LAMOTRIGINE 100 MG/1
200 TABLET ORAL NIGHTLY
Status: DISCONTINUED | OUTPATIENT
Start: 2024-01-20 | End: 2024-01-24 | Stop reason: HOSPADM

## 2024-01-20 RX ORDER — LANOLIN ALCOHOL/MO/W.PET/CERES
1000 CREAM (GRAM) TOPICAL DAILY
Status: DISCONTINUED | OUTPATIENT
Start: 2024-01-20 | End: 2024-01-24 | Stop reason: HOSPADM

## 2024-01-20 RX ORDER — SOLRIAMFETOL 150 MG/1
150 TABLET, FILM COATED ORAL DAILY
COMMUNITY

## 2024-01-20 RX ORDER — ONDANSETRON 4 MG/1
4 TABLET, FILM COATED ORAL EVERY 8 HOURS PRN
Status: DISCONTINUED | OUTPATIENT
Start: 2024-01-20 | End: 2024-01-24 | Stop reason: HOSPADM

## 2024-01-20 RX ORDER — LEVOTHYROXINE SODIUM 0.12 MG/1
125 TABLET ORAL DAILY
Status: DISCONTINUED | OUTPATIENT
Start: 2024-01-20 | End: 2024-01-24 | Stop reason: HOSPADM

## 2024-01-20 RX ORDER — PROPRANOLOL HCL 60 MG
60 CAPSULE, EXTENDED RELEASE 24HR ORAL DAILY
COMMUNITY

## 2024-01-20 RX ORDER — BUSPIRONE HYDROCHLORIDE 15 MG/1
15 TABLET ORAL 2 TIMES DAILY
Status: DISCONTINUED | OUTPATIENT
Start: 2024-01-20 | End: 2024-01-24 | Stop reason: HOSPADM

## 2024-01-20 RX ORDER — AZELASTINE 1 MG/ML
1 SPRAY, METERED NASAL 2 TIMES DAILY
Status: DISCONTINUED | OUTPATIENT
Start: 2024-01-20 | End: 2024-01-23 | Stop reason: RX

## 2024-01-20 RX ORDER — VENLAFAXINE 37.5 MG/1
37.5 TABLET ORAL DAILY
Status: ON HOLD | COMMUNITY
End: 2024-01-24 | Stop reason: HOSPADM

## 2024-01-20 RX ORDER — NITROGLYCERIN 0.4 MG/1
0.4 TABLET SUBLINGUAL EVERY 5 MIN PRN
Status: DISCONTINUED | OUTPATIENT
Start: 2024-01-20 | End: 2024-01-24 | Stop reason: HOSPADM

## 2024-01-20 RX ORDER — BENZONATATE 200 MG/1
200 CAPSULE ORAL 3 TIMES DAILY PRN
Status: DISCONTINUED | OUTPATIENT
Start: 2024-01-20 | End: 2024-01-24 | Stop reason: HOSPADM

## 2024-01-20 RX ORDER — BUDESONIDE AND FORMOTEROL FUMARATE DIHYDRATE 160; 4.5 UG/1; UG/1
2 AEROSOL RESPIRATORY (INHALATION)
Status: DISCONTINUED | OUTPATIENT
Start: 2024-01-20 | End: 2024-01-24 | Stop reason: HOSPADM

## 2024-01-20 RX ORDER — VENLAFAXINE 75 MG/1
75 TABLET ORAL DAILY
Status: ON HOLD | COMMUNITY
End: 2024-01-24 | Stop reason: HOSPADM

## 2024-01-20 RX ORDER — ARIPIPRAZOLE 5 MG/1
5 TABLET ORAL DAILY
Status: ON HOLD | COMMUNITY
End: 2024-01-24 | Stop reason: HOSPADM

## 2024-01-20 RX ORDER — PANTOPRAZOLE SODIUM 40 MG/1
40 TABLET, DELAYED RELEASE ORAL
Status: DISCONTINUED | OUTPATIENT
Start: 2024-01-21 | End: 2024-01-24 | Stop reason: HOSPADM

## 2024-01-20 RX ORDER — SUCRALFATE 1 G/1
1 TABLET ORAL
Status: DISCONTINUED | OUTPATIENT
Start: 2024-01-20 | End: 2024-01-24 | Stop reason: HOSPADM

## 2024-01-20 RX ORDER — ALBUTEROL SULFATE 90 UG/1
2 AEROSOL, METERED RESPIRATORY (INHALATION) EVERY 6 HOURS PRN
Status: DISCONTINUED | OUTPATIENT
Start: 2024-01-20 | End: 2024-01-24 | Stop reason: HOSPADM

## 2024-01-20 RX ORDER — PROPRANOLOL HCL 60 MG
60 CAPSULE, EXTENDED RELEASE 24HR ORAL DAILY
Status: DISCONTINUED | OUTPATIENT
Start: 2024-01-20 | End: 2024-01-24 | Stop reason: HOSPADM

## 2024-01-20 RX ORDER — VENLAFAXINE 50 MG/1
37.5 TABLET ORAL DAILY
Status: DISCONTINUED | OUTPATIENT
Start: 2024-01-20 | End: 2024-01-24 | Stop reason: HOSPADM

## 2024-01-20 RX ORDER — RISPERIDONE 1 MG/1
1 TABLET ORAL 2 TIMES DAILY
Status: DISCONTINUED | OUTPATIENT
Start: 2024-01-20 | End: 2024-01-24 | Stop reason: HOSPADM

## 2024-01-20 RX ADMIN — LAMOTRIGINE 200 MG: 100 TABLET ORAL at 21:36

## 2024-01-20 RX ADMIN — AMOXICILLIN AND CLAVULANATE POTASSIUM 1 TABLET: 875; 125 TABLET, FILM COATED ORAL at 08:37

## 2024-01-20 RX ADMIN — PROPRANOLOL HYDROCHLORIDE 60 MG: 60 CAPSULE, EXTENDED RELEASE ORAL at 16:35

## 2024-01-20 RX ADMIN — IPRATROPIUM BROMIDE AND ALBUTEROL SULFATE 1 DOSE: 2.5; .5 SOLUTION RESPIRATORY (INHALATION) at 19:59

## 2024-01-20 RX ADMIN — BUSPIRONE HYDROCHLORIDE 15 MG: 15 TABLET ORAL at 16:35

## 2024-01-20 RX ADMIN — AMOXICILLIN AND CLAVULANATE POTASSIUM 1 TABLET: 875; 125 TABLET, FILM COATED ORAL at 21:36

## 2024-01-20 RX ADMIN — BUSPIRONE HYDROCHLORIDE 15 MG: 15 TABLET ORAL at 21:36

## 2024-01-20 RX ADMIN — RISPERIDONE 1 MG: 1 TABLET, FILM COATED ORAL at 21:36

## 2024-01-20 RX ADMIN — VENLAFAXINE 37.5 MG: 37.5 TABLET ORAL at 16:36

## 2024-01-20 RX ADMIN — RISPERIDONE 1 MG: 1 TABLET, FILM COATED ORAL at 16:36

## 2024-01-20 RX ADMIN — IPRATROPIUM BROMIDE AND ALBUTEROL SULFATE 1 DOSE: 2.5; .5 SOLUTION RESPIRATORY (INHALATION) at 07:16

## 2024-01-20 RX ADMIN — ONDANSETRON 4 MG: 4 TABLET, ORALLY DISINTEGRATING ORAL at 08:44

## 2024-01-20 RX ADMIN — Medication 2 PUFF: at 20:09

## 2024-01-20 NOTE — H&P
thyroid not palpable  Lungs: Bilateral equal air entry, clear to ausculation, no wheezing, rales or rhonchi, normal effort  Cardiovascular: normal rate, regular rhythm, no murmur, gallop, rub.  Abdomen: Soft, nontender, nondistended, normal bowel sounds, no hepatomegaly or splenomegaly  Neurologic: There are no new focal motor or sensory deficits, normal muscle tone and bulk, no abnormal sensation, normal speech, cranial nerves II through XII grossly intact  Skin: No gross lesions, rashes, bruising or bleeding on exposed skin area  Extremities:  peripheral pulses palpable, no pedal edema or calf pain with palpation  Psych: Depressed, not making eye contact    Investigations:      Laboratory Testing:  No results found for this or any previous visit (from the past 24 hour(s)).    Consultations:   IP CONSULT TO INTERNAL MEDICINE    Assessment :      Primary Problem  Acute psychosis (HCC)    Active Hospital Problems    Diagnosis Date Noted    Acute psychosis (HCC) [F23] 01/19/2024       Plan:     Admitted with major depression, psychosis, getting managed by psychiatrist  COPD, DuoNeb nebulization, resuming home dose of Symbicort inhaler, albuterol as needed,  hypothyroidism, on Synthroid TSH tested recently at outlying facility is okay  GERD on Protonix  History of CVI D, will be started on IVIG soon monthly as per patient  Patient was treated with bronchitis as outpatient was on Augmentin which we will continue  LEROY not on CPAP does not want to use sleep machine        Gregorio Lam MD  1/20/2024  3:44 PM    Copy sent to Dr. Rubio, Valerie S, DO    Please note that this chart was generated using voice recognition Dragon dictation software.  Although every effort was made to ensure the accuracy of this automated transcription, some errors in transcription may have occurred.  
family history.   Patient states depression to run on mom side    Suicides in family: [] Yes [x] No    Substance use in family: [] Yes [x] No         PHYSICAL EXAM:  Vitals:  /82   Pulse 78   Temp 98 °F (36.7 °C) (Oral)   Resp 16   Ht 1.651 m (5' 5\")   Wt 100.7 kg (222 lb)   SpO2 99%   BMI 36.94 kg/m²   Pain Level: Denies    LABS:  Labs reviewed: [x] Yes  Metabolic Screening:  [x] Yes [] No     Last EKG in EMR reviewed: [x] Yes          Review of Systems   Constitutional: Negative for chills and weight loss.   HENT: Negative for ear pain and nosebleeds.    Eyes: Negative for blurred vision and photophobia.   Respiratory: Negative for cough, shortness of breath and wheezing.    Cardiovascular: Negative for chest pain and palpitations.   Gastrointestinal: Negative for abdominal pain, diarrhea and vomiting.   Genitourinary: Negative for dysuria and urgency.   Musculoskeletal: Negative for falls and joint pain.   Skin: Negative for itching and rash.   Neurological: Negative for tremors, seizures and weakness.   Endo/Heme/Allergies: Does not bruise/bleed easily.      Mental Status Examination:    Level of consciousness: Awake and alert  Appearance:  Appropriate attire, seated in chair, fair grooming   Behavior/Motor: Approachable, engages with interviewer, no psychomotor abnormalities  Attitude toward examiner: Semi-cooperative, semi-attentive, fair eye contact, irritable  Speech: Normal rate, volume, and tone.  Mood: Irritable, anxious, suspicious  Affect: Congruent, unstable, exaggerated  Thought processes:  Goal directed, linear  Thought content: Denies suicidal ideations, without current plan or intent, contracts for safety on the unit.               Denies homicidal ideations               Denies hallucinations currently but reports seeing and hearing people trying to break into her home               Presents with paranoid delusions  Cognition:  Oriented to self, location, time,
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
## 2024-01-20 NOTE — ED TRIAGE NOTES
Mode of arrival (squad #, walk in, police, etc) : police        Chief complaint(s): mental health        Arrival Note (brief scenario, treatment PTA, etc).: Pink slip by PD        C= \"Have you ever felt that you should Cut down on your drinking?\"  No  A= \"Have people Annoyed you by criticizing your drinking?\"  No  G= \"Have you ever felt bad or Guilty about your drinking?\"  No  E= \"Have you ever had a drink as an Eye-opener first thing in the morning to steady your nerves or to help a hangover?\"  No      Deferred []      Reason for deferring: N/A    *If yes to two or more: probable alcohol abuse.*

## 2024-01-20 NOTE — ED PROVIDER NOTES
Gastroesophageal reflux disease 1/27/2017    Hammer toe 1/27/2017    History of asthma 1/27/2017    History of knee replacement procedure of right knee 1/10/2017    History of pneumonia     3-4 years ago (Written 10/14/2019)    History of suburethral sling procedure 12/18/2017    Hx of blood clots 2017    When patient had a PIC line left chest. No longer on blood thinners per pt.    Hypothyroidism     Iron deficiency anemia 01/27/2017    No longer per pt.    Knee pain 4/13/2017    LBBB (left bundle branch block) 05/2019    Lumbosacral radiculitis 4/13/2017    Menopause present 12/10/2018    Menorrhagia 1/27/2017    Migraine headache     Mixed anxiety depressive disorder 1/27/2017    Moderate persistent asthma 1/27/2017    Morbid obesity (HCC) 1/27/2017    Morbid obesity with BMI of 40.0-44.9, adult (HCC) 12/4/2017    Narcolepsy     Occurred several years (Written 10/14/2019)    Nonalcoholic fatty liver disease 1/27/2017    Obesity (BMI 30-39.9) 1/10/2017    Pain due to knee joint prosthesis (HCC) 4/13/2017    Pain due to total right knee replacement (HCC) 4/13/2017    Pain in pelvis 9/20/2017    PONV (postoperative nausea and vomiting) 3/27/2018    Primary osteoarthritis of right knee 7/19/2017    Prolonged emergence from general anesthesia 3/27/2018    Recurrent urinary tract infection 8/17/2017    Restless leg 8/24/2017    Right knee pain 1/10/2017    Sleep apnea     Sleep apnea with use of continuous positive airway pressure (CPAP)     Status post arthroscopy of shoulder 1/2/2018    Stress incontinence 01/27/2017    In past no longer    Superior glenoid labrum lesion of shoulder 1/27/2017    Thrombocytopenic disorder (HCC) 4/13/2017    Urinary urgency 02/20/2015    Only with UTI per pt.    UTI due to extended-spectrum beta lactamase (ESBL) producing Escherichia coli 12/18/2017       SURGICAL HISTORY       Past Surgical History:   Procedure Laterality Date    APPENDECTOMY  11/99    CARPAL TUNNEL RELEASE Right

## 2024-01-20 NOTE — GROUP NOTE
Group Therapy Note    Date: 1/20/2024    Group Start Time: 1315  Group End Time: 1415  Group Topic: Relaxation    Maite Hameed CTRS    Relaxation Group Note        Date: January 20, 2024 Start Time:  115pm   End Time:  215pm      Number of Participants in Group & Unit Census:  11/16    Topic: relaxation group     Goal of Group: pt will identify benefits of using art for relaxation and coping       Comments:     Patient did not participate in Relaxation group, despite staff encouragement and explanation of benefits.  Patient remain seclusive to self.  Q15 minute safety checks maintained for patient safety and will continue to encourage patient to attend unit programming.              Signature:  ERLINDA ULNA

## 2024-01-21 LAB
AMPHET UR QL SCN: NEGATIVE
BACTERIA URNS QL MICRO: ABNORMAL
BARBITURATES UR QL SCN: NEGATIVE
BENZODIAZ UR QL: NEGATIVE
BILIRUB UR QL STRIP: NEGATIVE
CANNABINOIDS UR QL SCN: NEGATIVE
CLARITY UR: CLEAR
COCAINE UR QL SCN: NEGATIVE
COLOR UR: YELLOW
CRYSTALS URNS MICRO: ABNORMAL /HPF
CRYSTALS URNS MICRO: ABNORMAL /HPF
EPI CELLS #/AREA URNS HPF: ABNORMAL /HPF
FENTANYL UR QL: NEGATIVE
GLUCOSE UR STRIP-MCNC: NEGATIVE MG/DL
HGB UR QL STRIP.AUTO: NEGATIVE
KETONES UR STRIP-MCNC: ABNORMAL MG/DL
LEUKOCYTE ESTERASE UR QL STRIP: ABNORMAL
METHADONE UR QL: NEGATIVE
MUCOUS THREADS URNS QL MICRO: ABNORMAL
NITRITE UR QL STRIP: NEGATIVE
OPIATES UR QL SCN: NEGATIVE
OXYCODONE UR QL SCN: NEGATIVE
PCP UR QL SCN: NEGATIVE
PH UR STRIP: 5.5 [PH] (ref 5–8)
PROT UR STRIP-MCNC: NEGATIVE MG/DL
RBC #/AREA URNS HPF: ABNORMAL /HPF
SP GR UR STRIP: 1.02 (ref 1–1.03)
TEST INFORMATION: NORMAL
UROBILINOGEN UR STRIP-ACNC: NORMAL EU/DL (ref 0–1)
WBC #/AREA URNS HPF: ABNORMAL /HPF

## 2024-01-21 PROCEDURE — 6370000000 HC RX 637 (ALT 250 FOR IP): Performed by: PSYCHIATRY & NEUROLOGY

## 2024-01-21 PROCEDURE — 99232 SBSQ HOSP IP/OBS MODERATE 35: CPT | Performed by: PSYCHIATRY & NEUROLOGY

## 2024-01-21 PROCEDURE — 6370000000 HC RX 637 (ALT 250 FOR IP): Performed by: NURSE PRACTITIONER

## 2024-01-21 PROCEDURE — 99232 SBSQ HOSP IP/OBS MODERATE 35: CPT | Performed by: INTERNAL MEDICINE

## 2024-01-21 PROCEDURE — 94640 AIRWAY INHALATION TREATMENT: CPT

## 2024-01-21 PROCEDURE — 6370000000 HC RX 637 (ALT 250 FOR IP): Performed by: INTERNAL MEDICINE

## 2024-01-21 PROCEDURE — 1240000000 HC EMOTIONAL WELLNESS R&B

## 2024-01-21 PROCEDURE — APPSS30 APP SPLIT SHARED TIME 16-30 MINUTES: Performed by: NURSE PRACTITIONER

## 2024-01-21 PROCEDURE — 94761 N-INVAS EAR/PLS OXIMETRY MLT: CPT

## 2024-01-21 RX ORDER — PREDNISONE 20 MG/1
20 TABLET ORAL DAILY
Status: DISCONTINUED | OUTPATIENT
Start: 2024-01-21 | End: 2024-01-24 | Stop reason: HOSPADM

## 2024-01-21 RX ORDER — LACTOBACILLUS RHAMNOSUS GG 10B CELL
1 CAPSULE ORAL 2 TIMES DAILY WITH MEALS
Status: DISCONTINUED | OUTPATIENT
Start: 2024-01-21 | End: 2024-01-22

## 2024-01-21 RX ORDER — LACTOBACILLUS RHAMNOSUS GG 10B CELL
1 CAPSULE ORAL
Status: DISCONTINUED | OUTPATIENT
Start: 2024-01-22 | End: 2024-01-21

## 2024-01-21 RX ADMIN — BUSPIRONE HYDROCHLORIDE 15 MG: 15 TABLET ORAL at 09:01

## 2024-01-21 RX ADMIN — LAMOTRIGINE 200 MG: 100 TABLET ORAL at 21:03

## 2024-01-21 RX ADMIN — RISPERIDONE 1 MG: 1 TABLET, FILM COATED ORAL at 09:01

## 2024-01-21 RX ADMIN — PANTOPRAZOLE SODIUM 40 MG: 40 TABLET, DELAYED RELEASE ORAL at 06:53

## 2024-01-21 RX ADMIN — PREDNISONE 20 MG: 20 TABLET ORAL at 17:50

## 2024-01-21 RX ADMIN — BUSPIRONE HYDROCHLORIDE 15 MG: 15 TABLET ORAL at 21:03

## 2024-01-21 RX ADMIN — IPRATROPIUM BROMIDE AND ALBUTEROL SULFATE 1 DOSE: 2.5; .5 SOLUTION RESPIRATORY (INHALATION) at 07:35

## 2024-01-21 RX ADMIN — AMOXICILLIN AND CLAVULANATE POTASSIUM 1 TABLET: 875; 125 TABLET, FILM COATED ORAL at 09:01

## 2024-01-21 RX ADMIN — AMOXICILLIN AND CLAVULANATE POTASSIUM 1 TABLET: 875; 125 TABLET, FILM COATED ORAL at 21:03

## 2024-01-21 RX ADMIN — Medication 2 PUFF: at 07:45

## 2024-01-21 RX ADMIN — LEVOTHYROXINE SODIUM 125 MCG: 125 TABLET ORAL at 09:01

## 2024-01-21 RX ADMIN — IPRATROPIUM BROMIDE AND ALBUTEROL SULFATE 1 DOSE: 2.5; .5 SOLUTION RESPIRATORY (INHALATION) at 15:09

## 2024-01-21 RX ADMIN — Medication 1 CAPSULE: at 21:03

## 2024-01-21 RX ADMIN — Medication 2 PUFF: at 20:05

## 2024-01-21 RX ADMIN — IPRATROPIUM BROMIDE AND ALBUTEROL SULFATE 1 DOSE: 2.5; .5 SOLUTION RESPIRATORY (INHALATION) at 19:55

## 2024-01-21 RX ADMIN — VENLAFAXINE 37.5 MG: 37.5 TABLET ORAL at 09:01

## 2024-01-21 RX ADMIN — PROPRANOLOL HYDROCHLORIDE 60 MG: 60 CAPSULE, EXTENDED RELEASE ORAL at 09:01

## 2024-01-21 RX ADMIN — RISPERIDONE 1 MG: 1 TABLET, FILM COATED ORAL at 21:03

## 2024-01-21 NOTE — GROUP NOTE
Group Therapy Note    Date: 1/21/2024    Group Start Time: 1330  Group End Time: 1415  Group Topic: Relaxation    Maite Hameed CTRS      Relaxation Group Note        Date: January 21, 2024 Start Time: 1:30pm  End Time:  215pm      Number of Participants in Group & Unit Census:  10/18    Topic: relaxation group     Goal of Group: pt will identify benefits of using art for coping and relaxation       Comments:     Patient did not participate in Relaxation group, despite staff encouragement and explanation of benefits.  Patient remain seclusive to self.  Q15 minute safety checks maintained for patient safety and will continue to encourage patient to attend unit programming.              Signature:  ERLINDA LUNA

## 2024-01-21 NOTE — GROUP NOTE
Group Therapy Note    Date: 1/21/2024    Group Start Time: 0900  Group End Time: 0915  Group Topic: Community Meeting    Janet Kern      Community Meeting Group Note        Date: 1/21/2024 Start Time: 9am  End Time: 0915      Number of Participants in Group & Unit Census:  11/18        Goal of Group: daily goals       Comments:     Patient did not participate in Community Meeting group, despite staff encouragement and explanation of benefits.  Patient remain seclusive to self.  Q15 minute safety checks maintained for patient safety and will continue to encourage patient to attend unit programming.

## 2024-01-21 NOTE — GROUP NOTE
Group Therapy Note    Date: 1/21/2024    Group Start Time: 1030  Group End Time: 1045  Group Topic: Psychoeducation    Kayli Sanches, LILIBETHW        Group Therapy Note    Attendees: 10/17       patient refused to attend psychoeducation group at 1030a after encouragement from staff.  1:1 talk time provided as alternative to group session

## 2024-01-22 LAB — GLUCOSE BLD-MCNC: 99 MG/DL (ref 65–105)

## 2024-01-22 PROCEDURE — 1240000000 HC EMOTIONAL WELLNESS R&B

## 2024-01-22 PROCEDURE — 94640 AIRWAY INHALATION TREATMENT: CPT

## 2024-01-22 PROCEDURE — 99232 SBSQ HOSP IP/OBS MODERATE 35: CPT | Performed by: PSYCHIATRY & NEUROLOGY

## 2024-01-22 PROCEDURE — 6370000000 HC RX 637 (ALT 250 FOR IP): Performed by: NURSE PRACTITIONER

## 2024-01-22 PROCEDURE — APPSS30 APP SPLIT SHARED TIME 16-30 MINUTES: Performed by: NURSE PRACTITIONER

## 2024-01-22 PROCEDURE — 6370000000 HC RX 637 (ALT 250 FOR IP): Performed by: PSYCHIATRY & NEUROLOGY

## 2024-01-22 PROCEDURE — 94761 N-INVAS EAR/PLS OXIMETRY MLT: CPT

## 2024-01-22 PROCEDURE — 6370000000 HC RX 637 (ALT 250 FOR IP): Performed by: INTERNAL MEDICINE

## 2024-01-22 PROCEDURE — 82947 ASSAY GLUCOSE BLOOD QUANT: CPT

## 2024-01-22 PROCEDURE — 99231 SBSQ HOSP IP/OBS SF/LOW 25: CPT | Performed by: INTERNAL MEDICINE

## 2024-01-22 RX ORDER — LACTOBACILLUS RHAMNOSUS GG 10B CELL
1 CAPSULE ORAL
Status: DISCONTINUED | OUTPATIENT
Start: 2024-01-23 | End: 2024-01-24 | Stop reason: HOSPADM

## 2024-01-22 RX ADMIN — BUSPIRONE HYDROCHLORIDE 15 MG: 15 TABLET ORAL at 20:34

## 2024-01-22 RX ADMIN — AMOXICILLIN AND CLAVULANATE POTASSIUM 1 TABLET: 875; 125 TABLET, FILM COATED ORAL at 09:01

## 2024-01-22 RX ADMIN — AMOXICILLIN AND CLAVULANATE POTASSIUM 1 TABLET: 875; 125 TABLET, FILM COATED ORAL at 20:34

## 2024-01-22 RX ADMIN — IPRATROPIUM BROMIDE AND ALBUTEROL SULFATE 1 DOSE: 2.5; .5 SOLUTION RESPIRATORY (INHALATION) at 07:54

## 2024-01-22 RX ADMIN — BUSPIRONE HYDROCHLORIDE 15 MG: 15 TABLET ORAL at 09:01

## 2024-01-22 RX ADMIN — RISPERIDONE 1 MG: 1 TABLET, FILM COATED ORAL at 09:01

## 2024-01-22 RX ADMIN — VENLAFAXINE 37.5 MG: 37.5 TABLET ORAL at 09:01

## 2024-01-22 RX ADMIN — IPRATROPIUM BROMIDE AND ALBUTEROL SULFATE 1 DOSE: 2.5; .5 SOLUTION RESPIRATORY (INHALATION) at 21:31

## 2024-01-22 RX ADMIN — Medication 1 CAPSULE: at 09:01

## 2024-01-22 RX ADMIN — RISPERIDONE 1 MG: 1 TABLET, FILM COATED ORAL at 20:34

## 2024-01-22 RX ADMIN — IPRATROPIUM BROMIDE AND ALBUTEROL SULFATE 1 DOSE: 2.5; .5 SOLUTION RESPIRATORY (INHALATION) at 15:08

## 2024-01-22 RX ADMIN — PREDNISONE 20 MG: 20 TABLET ORAL at 09:01

## 2024-01-22 RX ADMIN — LAMOTRIGINE 200 MG: 100 TABLET ORAL at 20:34

## 2024-01-22 RX ADMIN — Medication 2 PUFF: at 20:35

## 2024-01-22 RX ADMIN — LEVOTHYROXINE SODIUM 125 MCG: 125 TABLET ORAL at 09:00

## 2024-01-22 RX ADMIN — PROPRANOLOL HYDROCHLORIDE 60 MG: 60 CAPSULE, EXTENDED RELEASE ORAL at 09:00

## 2024-01-22 RX ADMIN — Medication 2 PUFF: at 07:56

## 2024-01-22 RX ADMIN — PANTOPRAZOLE SODIUM 40 MG: 40 TABLET, DELAYED RELEASE ORAL at 09:01

## 2024-01-22 ASSESSMENT — PAIN SCALES - GENERAL: PAINLEVEL_OUTOF10: 0

## 2024-01-22 NOTE — GROUP NOTE
Group Therapy Note    Date: 1/22/2024    Group Start Time: 1015  Group End Time: 1045  Group Topic: Psychotherapy    Juan M Jasso        Group Therapy Note    Attendees: 10/19       Patient declined to attend psychotherapy group after encouragement from staff.  1:1 talk time offered but refused.       Signature:  Juan M Rubio

## 2024-01-22 NOTE — GROUP NOTE
Group Therapy Note    Date: 1/22/2024    Group Start Time: 1100  Group End Time: 1145  Group Topic: Cognitive Skills    Maite Hameed CTRS    Cognitive Skills Group Note        Date: January 22, 2024 Start Time: 11am  End Time: 11:45am      Number of Participants in Group & Unit Census:  12/19    Topic: cognitive skills     Goal of Group: pt will demonstrate improved coping skills and improved interpersonal relationships      Comments:     Patient did not participate in Cognitive Skills group, despite staff encouragement and explanation of benefits.  Patient remain seclusive to self.  Q15 minute safety checks maintained for patient safety and will continue to encourage patient to attend unit programming.            Signature:  ERLINDA LUNA

## 2024-01-22 NOTE — GROUP NOTE
Group Therapy Note    Date: 1/22/2024    Group Start Time: 0900  Group End Time: 1000  Group Topic: Group Documentation    Brien Lazar        Group Therapy Note    Attendees: 13/19    Goal  Group Note        Date: 1/22/2024  Start Time: 0900 End Time: 1000      Number of Participants in Group & Unit Census:  13/19    Topic: goal group    Goal of Group:Morning goal group session, patient has opportunity to reflect on what they need to accomplish to achieve discharge, and decide on a step towards that that they would like to accomplish by the end of the day today.      Comments:     Patient did not participate in  Goal  group, despite staff encouragement and explanation of benefits.  Patient remain seclusive to self.  Q15 minute safety checks maintained for patient safety and will continue to encourage patient to attend unit programming.        Modes of Intervention: Support and Socialization      Discipline Responsible: Behavorial Health Tech      Signature:  Brien Doll

## 2024-01-22 NOTE — GROUP NOTE
Group Therapy Note    Date: 1/22/2024    Group Start Time: 1330  Group End Time: 1445  Group Topic: recreation therapy group     [facility] Maite Patel CTRS        Group Therapy Note    Attendees: 14/19       Patient's Goal:  pt will demonstrate improved improved leisure awareness and improved interpersonal relationships     Notes:  pt was pleasant and participated well    Status After Intervention:  Improved    Participation Level: Active Listener and Interactive    Participation Quality: Appropriate and Supportive      Speech:  normal      Thought Process/Content: Logical      Affective Functioning: flat      Mood: depressed      Level of consciousness:  Alert      Response to Learning: Able to verbalize current knowledge/experience, Capable of insight, and Progressing to goal      Endings: None Reported    Modes of Intervention: Education, Support, and Activity      Discipline Responsible: Psychoeducational Specialist      Signature:  ERLINDA LUNA

## 2024-01-23 LAB
ALBUMIN SERPL-MCNC: 3.8 G/DL (ref 3.5–5.2)
ALP SERPL-CCNC: 83 U/L (ref 35–104)
ALT SERPL-CCNC: 16 U/L (ref 5–33)
ANION GAP SERPL CALCULATED.3IONS-SCNC: 11 MMOL/L (ref 9–17)
AST SERPL-CCNC: 32 U/L
BILIRUB SERPL-MCNC: 0.2 MG/DL (ref 0.3–1.2)
BUN SERPL-MCNC: 16 MG/DL (ref 6–20)
CALCIUM SERPL-MCNC: 9.7 MG/DL (ref 8.6–10.4)
CHLORIDE SERPL-SCNC: 105 MMOL/L (ref 98–107)
CO2 SERPL-SCNC: 25 MMOL/L (ref 20–31)
CREAT SERPL-MCNC: 1.1 MG/DL (ref 0.5–0.9)
GFR SERPL CREATININE-BSD FRML MDRD: 58 ML/MIN/1.73M2
GLUCOSE SERPL-MCNC: 92 MG/DL (ref 70–99)
POTASSIUM SERPL-SCNC: 4.9 MMOL/L (ref 3.7–5.3)
PROT SERPL-MCNC: 6 G/DL (ref 6.4–8.3)
SODIUM SERPL-SCNC: 141 MMOL/L (ref 135–144)

## 2024-01-23 PROCEDURE — 6370000000 HC RX 637 (ALT 250 FOR IP): Performed by: NURSE PRACTITIONER

## 2024-01-23 PROCEDURE — 6370000000 HC RX 637 (ALT 250 FOR IP): Performed by: INTERNAL MEDICINE

## 2024-01-23 PROCEDURE — APPSS30 APP SPLIT SHARED TIME 16-30 MINUTES

## 2024-01-23 PROCEDURE — 94640 AIRWAY INHALATION TREATMENT: CPT

## 2024-01-23 PROCEDURE — 99232 SBSQ HOSP IP/OBS MODERATE 35: CPT | Performed by: PSYCHIATRY & NEUROLOGY

## 2024-01-23 PROCEDURE — 94761 N-INVAS EAR/PLS OXIMETRY MLT: CPT

## 2024-01-23 PROCEDURE — 1240000000 HC EMOTIONAL WELLNESS R&B

## 2024-01-23 PROCEDURE — 6370000000 HC RX 637 (ALT 250 FOR IP): Performed by: PSYCHIATRY & NEUROLOGY

## 2024-01-23 PROCEDURE — 80053 COMPREHEN METABOLIC PANEL: CPT

## 2024-01-23 PROCEDURE — 36415 COLL VENOUS BLD VENIPUNCTURE: CPT

## 2024-01-23 RX ORDER — LOPERAMIDE HYDROCHLORIDE 2 MG/1
2 CAPSULE ORAL ONCE
Status: COMPLETED | OUTPATIENT
Start: 2024-01-23 | End: 2024-01-24

## 2024-01-23 RX ADMIN — PREDNISONE 20 MG: 20 TABLET ORAL at 08:29

## 2024-01-23 RX ADMIN — PANTOPRAZOLE SODIUM 40 MG: 40 TABLET, DELAYED RELEASE ORAL at 08:29

## 2024-01-23 RX ADMIN — PROPRANOLOL HYDROCHLORIDE 60 MG: 60 CAPSULE, EXTENDED RELEASE ORAL at 08:29

## 2024-01-23 RX ADMIN — Medication 2 PUFF: at 11:55

## 2024-01-23 RX ADMIN — RISPERIDONE 1 MG: 1 TABLET, FILM COATED ORAL at 08:29

## 2024-01-23 RX ADMIN — IPRATROPIUM BROMIDE AND ALBUTEROL SULFATE 1 DOSE: 2.5; .5 SOLUTION RESPIRATORY (INHALATION) at 08:25

## 2024-01-23 RX ADMIN — VENLAFAXINE 37.5 MG: 37.5 TABLET ORAL at 08:29

## 2024-01-23 RX ADMIN — RISPERIDONE 1 MG: 1 TABLET, FILM COATED ORAL at 21:36

## 2024-01-23 RX ADMIN — IPRATROPIUM BROMIDE AND ALBUTEROL SULFATE 1 DOSE: 2.5; .5 SOLUTION RESPIRATORY (INHALATION) at 11:55

## 2024-01-23 RX ADMIN — AMOXICILLIN AND CLAVULANATE POTASSIUM 1 TABLET: 875; 125 TABLET, FILM COATED ORAL at 21:35

## 2024-01-23 RX ADMIN — BUSPIRONE HYDROCHLORIDE 15 MG: 15 TABLET ORAL at 08:29

## 2024-01-23 RX ADMIN — BENZONATATE 200 MG: 200 CAPSULE ORAL at 08:32

## 2024-01-23 RX ADMIN — AMOXICILLIN AND CLAVULANATE POTASSIUM 1 TABLET: 875; 125 TABLET, FILM COATED ORAL at 08:29

## 2024-01-23 RX ADMIN — Medication 1 CAPSULE: at 16:56

## 2024-01-23 RX ADMIN — LEVOTHYROXINE SODIUM 125 MCG: 125 TABLET ORAL at 08:29

## 2024-01-23 RX ADMIN — BUSPIRONE HYDROCHLORIDE 15 MG: 15 TABLET ORAL at 21:36

## 2024-01-23 RX ADMIN — Medication 1 CAPSULE: at 08:29

## 2024-01-23 RX ADMIN — LAMOTRIGINE 200 MG: 100 TABLET ORAL at 21:35

## 2024-01-23 NOTE — GROUP NOTE
Group Therapy Note    Date: 1/23/2024    Group Start Time: 0900  Group End Time: 0915  Group Topic: Community Meeting    Janet Kern    Community Meeting Group Note        Date: 1/23/2024 Start Time: 9am  End Time: 0915      Number of Participants in Group & Unit Census:  12/20        Goal of Group: To discuss daily goals       Comments:     Patient did not participate in Community Meeting group, despite staff encouragement and explanation of benefits.  Patient remain seclusive to self.  Q15 minute safety checks maintained for patient safety and will continue to encourage patient to attend unit programming.

## 2024-01-23 NOTE — GROUP NOTE
Group Therapy Note    Date: 1/23/2024    Group Start Time: 1000  Group End Time: 1045  Group Topic: Group Therapy    Jessica Hawkins LPN        Group Therapy Note    Attendees: 13/20    Patient has attended discharge planning and safety planning group.      Signature:  Jessica Boo LPN

## 2024-01-23 NOTE — GROUP NOTE
Group Therapy Note    Date: 1/23/2024    Group Start Time: 1100  Group End Time: 1140  Group Topic: Cognitive Skills    Maite Hameed CTRS    Cognitive Skills Group Note        Date: January 23, 2024 Start Time: 11am  End Time:  1145am      Number of Participants in Group & Unit Census:  13/20    Topic: cognitive skills     Goal of Group:pt will demonstrate improved coping skills and improved decision making skills       Comments:     Patient did not participate in Cognitive Skills group, despite staff encouragement and explanation of benefits.  Patient remain seclusive to self.  Q15 minute safety checks maintained for patient safety and will continue to encourage patient to attend unit programming.            Signature:  ERLINDA LUNA

## 2024-01-23 NOTE — GROUP NOTE
Group Therapy Note    Date: 1/23/2024    Group Start Time: 0230  Group End Time: 0330  Group Topic: Cognitive Skills    Sosa Calixto LPN        Group Therapy Note    Attendees: 13/20       Patient's Goal: Feeling better    Status After Intervention:  Improved    Participation Level: Active Listener and Interactive    Participation Quality: Appropriate, Attentive, Sharing, and Supportive      Speech:  normal and hesitant      Thought Process/Content: Logical  Flight of ideas      Affective Functioning: Congruent      Mood: elevated      Level of consciousness:  Alert, Oriented x4, and Attentive      Response to Learning: Able to verbalize current knowledge/experience, Able to verbalize/acknowledge new learning, Able to retain information, Capable of insight, and Able to change behavior      Endings: None Reported    Modes of Intervention: Education, Support, Socialization, Clarifying, Problem-solving, Activity, and Limit-setting      Discipline Responsible: Licensed Practical Nurse      Signature:  Sosa Vargas LPN

## 2024-01-24 VITALS
HEART RATE: 64 BPM | HEIGHT: 65 IN | RESPIRATION RATE: 16 BRPM | TEMPERATURE: 97.3 F | OXYGEN SATURATION: 96 % | SYSTOLIC BLOOD PRESSURE: 107 MMHG | DIASTOLIC BLOOD PRESSURE: 62 MMHG | BODY MASS INDEX: 36.99 KG/M2 | WEIGHT: 222 LBS

## 2024-01-24 PROCEDURE — 6370000000 HC RX 637 (ALT 250 FOR IP)

## 2024-01-24 PROCEDURE — 94640 AIRWAY INHALATION TREATMENT: CPT

## 2024-01-24 PROCEDURE — 6370000000 HC RX 637 (ALT 250 FOR IP): Performed by: INTERNAL MEDICINE

## 2024-01-24 PROCEDURE — 6370000000 HC RX 637 (ALT 250 FOR IP): Performed by: NURSE PRACTITIONER

## 2024-01-24 PROCEDURE — 6370000000 HC RX 637 (ALT 250 FOR IP): Performed by: PSYCHIATRY & NEUROLOGY

## 2024-01-24 PROCEDURE — 94761 N-INVAS EAR/PLS OXIMETRY MLT: CPT

## 2024-01-24 PROCEDURE — 99239 HOSP IP/OBS DSCHRG MGMT >30: CPT | Performed by: PSYCHIATRY & NEUROLOGY

## 2024-01-24 RX ORDER — LEVOTHYROXINE SODIUM 0.12 MG/1
125 TABLET ORAL DAILY
Qty: 30 TABLET | Refills: 3 | Status: SHIPPED | OUTPATIENT
Start: 2024-01-25

## 2024-01-24 RX ORDER — LAMOTRIGINE 200 MG/1
200 TABLET ORAL NIGHTLY
Qty: 30 TABLET | Refills: 3 | Status: SHIPPED | OUTPATIENT
Start: 2024-01-24

## 2024-01-24 RX ORDER — PREDNISONE 20 MG/1
20 TABLET ORAL DAILY
Qty: 1 TABLET | Refills: 0 | Status: SHIPPED | OUTPATIENT
Start: 2024-01-25 | End: 2024-01-26

## 2024-01-24 RX ORDER — AMOXICILLIN AND CLAVULANATE POTASSIUM 875; 125 MG/1; MG/1
1 TABLET, FILM COATED ORAL EVERY 12 HOURS SCHEDULED
Qty: 18 TABLET | Refills: 0 | Status: SHIPPED | OUTPATIENT
Start: 2024-01-24 | End: 2024-02-02

## 2024-01-24 RX ORDER — SUCRALFATE 1 G/1
1 TABLET ORAL
Qty: 120 TABLET | Refills: 3 | Status: SHIPPED | OUTPATIENT
Start: 2024-01-24

## 2024-01-24 RX ADMIN — LOPERAMIDE HYDROCHLORIDE 2 MG: 2 CAPSULE ORAL at 06:26

## 2024-01-24 RX ADMIN — PANTOPRAZOLE SODIUM 40 MG: 40 TABLET, DELAYED RELEASE ORAL at 06:26

## 2024-01-24 RX ADMIN — BUSPIRONE HYDROCHLORIDE 15 MG: 15 TABLET ORAL at 08:50

## 2024-01-24 RX ADMIN — Medication 1 CAPSULE: at 12:08

## 2024-01-24 RX ADMIN — Medication 2 PUFF: at 08:10

## 2024-01-24 RX ADMIN — PREDNISONE 20 MG: 20 TABLET ORAL at 08:51

## 2024-01-24 RX ADMIN — AMOXICILLIN AND CLAVULANATE POTASSIUM 1 TABLET: 875; 125 TABLET, FILM COATED ORAL at 08:49

## 2024-01-24 RX ADMIN — LEVOTHYROXINE SODIUM 125 MCG: 125 TABLET ORAL at 08:51

## 2024-01-24 RX ADMIN — VENLAFAXINE 37.5 MG: 37.5 TABLET ORAL at 08:49

## 2024-01-24 RX ADMIN — RISPERIDONE 1 MG: 1 TABLET, FILM COATED ORAL at 08:50

## 2024-01-24 RX ADMIN — Medication 1 CAPSULE: at 08:49

## 2024-01-24 RX ADMIN — IPRATROPIUM BROMIDE AND ALBUTEROL SULFATE 1 DOSE: 2.5; .5 SOLUTION RESPIRATORY (INHALATION) at 08:00

## 2024-01-24 RX ADMIN — PROPRANOLOL HYDROCHLORIDE 60 MG: 60 CAPSULE, EXTENDED RELEASE ORAL at 08:50

## 2024-01-24 NOTE — TRANSITION OF CARE
daily  Start taking on: January 25, 2024  What changed: medication strength            CONTINUE taking these medications      benzonatate 200 MG capsule  Commonly known as: TESSALON     budesonide-formoterol 160-4.5 MCG/ACT Aero  Commonly known as: SYMBICORT     busPIRone 15 MG tablet  Commonly known as: BUSPAR     ciclopirox 8 % solution  Commonly known as: PENLAC  Apply topically nightly. Remove once weekly with alcohol or nail polish remover.     cyanocobalamin 1000 MCG tablet     nitroGLYCERIN 0.4 MG SL tablet  Commonly known as: NITROSTAT     ondansetron 4 MG tablet  Commonly known as: ZOFRAN     pantoprazole 40 MG tablet  Commonly known as: PROTONIX     propranolol 60 MG extended release capsule  Commonly known as: INDERAL LA     sucralfate 1 GM tablet  Commonly known as: CARAFATE  Take 1 tablet by mouth three times daily     Sunosi 150 MG Tabs  Generic drug: Solriamfetol HCl            STOP taking these medications      albuterol sulfate  (90 Base) MCG/ACT inhaler  Commonly known as: PROVENTIL;VENTOLIN;PROAIR     ARIPiprazole 5 MG tablet  Commonly known as: ABILIFY     azelastine 0.1 % nasal spray  Commonly known as: ASTELIN     buPROPion 150 MG extended release tablet  Commonly known as: WELLBUTRIN XL     butalbital-acetaminophen-caffeine -40 MG per tablet  Commonly known as: FIORICET, ESGIC     fluticasone 50 MCG/ACT nasal spray  Commonly known as: FLONASE     gabapentin 300 MG capsule  Commonly known as: NEURONTIN     Hizentra 10 GM/50ML Soln subcutaneous solution  Generic drug: immune globulin 20%     LORazepam 0.5 MG tablet  Commonly known as: ATIVAN     Lutein-Zeaxanthin 25-5 MG Caps     methylPREDNISolone 4 MG tablet  Commonly known as: MEDROL DOSEPACK     ondansetron 4 MG disintegrating tablet  Commonly known as: ZOFRAN-ODT     oxyCODONE-acetaminophen 5-325 MG per tablet  Commonly known as: PERCOCET     pimecrolimus 1 % cream  Commonly known as: ELIDEL     rOPINIRole 0.5 MG 
Unknown if ever smoked

## 2024-01-24 NOTE — BH NOTE
Augmentin administered as ordered after verifying with pharmacist and patient OK to give. Nausea is a side affect to this medication for patient, Zofran administered as ordered. See MAR. Will monitor.   
Patient agreeable to letting her sister take her security belongings from the safe.   
Patient comes to nurses desk complaining of dizziness and \"feeling hot all of a sudden,\" writer escorted patient to room and checked vitals /70 P 64. Patient denies any pain at this time. Blood sugar check was 99. Patient states this happens at home from time to time and she just lays down and turns a fan on. Patient is currently laying in bed with a cool rag. Dr. Ruiz was on unit and did come in to patient's room for an assessment.   
Patient continues to have intermittent cough, worsening upon laying flat. Patient slept sitting upright throughout the day due to troubles breathing. Patient out and social more. Patient ate some dinner and an Ensure due to \"not being hungry.\" Patient educated on the risks and benefits of adhering to physicians orders for sleep apnea and nutrition. Patient voiced understanding.  
Patient offered to sign in voluntarily multiple times throughout the day but refuses at this time due to not understanding. Patient discussed matters with writer and BHT. Patient wants to discuss with  Monday.  
Patient refused CPAP due to the inability to breath from nasal sickness.  
Patient refuses Vitamin B-12 and Carafate because she states \"she is no longer on those.\"  
Patient states she is not a smoker.   
Pt refused all meals,crying, seclusive in room.  
4/13/2017    LBBB (left bundle branch block) 05/2019    Lumbosacral radiculitis 4/13/2017    Menopause present 12/10/2018    Menorrhagia 1/27/2017    Migraine headache     Mixed anxiety depressive disorder 1/27/2017    Moderate persistent asthma 1/27/2017    Morbid obesity (HCC) 1/27/2017    Morbid obesity with BMI of 40.0-44.9, adult (HCC) 12/4/2017    Narcolepsy     Occurred several years (Written 10/14/2019)    Nonalcoholic fatty liver disease 1/27/2017    Obesity (BMI 30-39.9) 1/10/2017    Pain due to knee joint prosthesis (HCC) 4/13/2017    Pain due to total right knee replacement (HCC) 4/13/2017    Pain in pelvis 9/20/2017    PONV (postoperative nausea and vomiting) 3/27/2018    Primary osteoarthritis of right knee 7/19/2017    Prolonged emergence from general anesthesia 3/27/2018    Recurrent urinary tract infection 8/17/2017    Restless leg 8/24/2017    Right knee pain 1/10/2017    Sleep apnea     Sleep apnea with use of continuous positive airway pressure (CPAP)     Status post arthroscopy of shoulder 1/2/2018    Stress incontinence 01/27/2017    In past no longer    Superior glenoid labrum lesion of shoulder 1/27/2017    Thrombocytopenic disorder (HCC) 4/13/2017    Urinary urgency 02/20/2015    Only with UTI per pt.    UTI due to extended-spectrum beta lactamase (ESBL) producing Escherichia coli 12/18/2017       Status EXAM:  Mental Status and Behavioral Exam  Normal: No  Level of Assistance: Independent/Self  Facial Expression: Worried, Exaggerated  Affect: Unstable  Level of Consciousness: Alert  Frequency of Checks: 4 times per hour, close  Mood:Normal: No  Mood: Anxious, Suspicious, Irritable  Motor Activity:Normal: Yes  Eye Contact: Fair  Observed Behavior: Preoccupied, Agitated  Sexual Misconduct History: Current - no  Preception: [place] to person, [place] to time, [place] to place, [place] to situation  Attention:Normal: No  Attention: Distractible  Thought Processes: Circumstantial  Thought 
manage stress,relaxation techniques, changing routine, distraction)                                                           ( ) Basic information about quitting (benefits of quitting, techniques in how to quit, available resources  ( ) [facility]                                                                                                                   ( ) Patient refused counseling  ( ) Patient refused referral  ( ) Patient refused prescription upon discharge  (X) Patient has not smoked in the last 30 days    Metabolic Screening:    No results found for: \"LABA1C\"    No results found for: \"CHOL\"  No results found for: \"TRIG\"  No results found for: \"HDL\"  No components found for: \"LDLCAL\"  No components found for: \"LABVLDL\"    Abigail Chi LPN

## 2024-01-24 NOTE — DISCHARGE SUMMARY
DISCHARGE SUMMARY      Patient ID:  Soni Bledsoe  [id number]  58 y.o.  1965    Admit date: 1/19/2024    Discharge date and time: 1/24/2024    Disposition: Home     Admitting Physician: Conner Ruiz MD     Discharge Physician: Dr LISSETH Ruiz MD    Admission Diagnoses: Acute psychosis (HCC) [F23]    Admission Condition: poor    Discharged Condition: stable    Admission Circumstance: Soni Bledsoe is a 58 y.o. female who has a past medical history of arthritis,Chondromalacia, chronic pain disorder, C VID, DDD, GERD, asthma, pneumonia, hypothyroidism, blood clots, LBBB, migraines, morbid obesity, narcolepsy, restless legs, UTI, sleep apnea, Thrombocytopenic disorder, depression and anxiety. Patient presented to the ED via law enforcement for emergency admission.   Per ED social work documentation, \"Patient is a 58 year old  female who presented to ED via Law Enforcement for a mental health evaluation.  Patient was placed on an application for emergency admission stating \"Ms. Bledsoe has exhibited signs of seeing things that are present.  Ms. Bledsoe has called the [facility] [employer] numerous times int he last few weeks. Due to seeing people on her property.  After reviewing the camera footage from Ms. Bledsoe no one was seen on her property.  Ms Bledsoe had her back door barricaded with wood planks the last time I was at her residence.  On January 16, 2024 Ms Bledsoe called the [facility] [employer] to report foot prints on her property.  Once officers arrived on scene to investigate none were observed.  [place] [id number] observed a firearm in Ms Bledsoe's hand.  [id number] ordered Ms Bledsoe to drop the gun and Ms Bledsoe began to inspect the gun nearly pointing it at [facility] [id number].  As of today Jan 19, 2024 Ms Bledsoe is convinced that there is a bomb at her residence ([address] [place] [address]).  Ms Bledsoe has since been staying at a hotel due to her paranoia.  Based on repeated

## 2024-01-24 NOTE — PLAN OF CARE
Problem: Anxiety  Goal: Will report anxiety at manageable levels  Description: INTERVENTIONS:  1. Administer medication as ordered  2. Teach and rehearse alternative coping skills  3. Provide emotional support with 1:1 interaction with staff  1/22/2024 2158 by Yolanda Samuel LPN  Outcome: Progressing  Note: Patient out and social at [facility].   Patient just doesn't like most things the group watches on television.   Patient friendly and cooperative with staff.   Patient educated to seek help from staff if needing anything.       
  Problem: Anxiety  Goal: Will report anxiety at manageable levels  Description: INTERVENTIONS:  1. Administer medication as ordered  2. Teach and rehearse alternative coping skills  3. Provide emotional support with 1:1 interaction with staff  1/23/2024 1140 by Janet Cuevas  Outcome: Progressing  Flowsheets (Taken 1/23/2024 1140)  Will report anxiety at manageable levels:   Provide emotional support with 1:1 interaction with staff   Teach and rehearse alternative coping skills  Note: Patient is accepting of 1:1 talk time with staff. Patient denies suicidal and homicidal ideation and auditory and visual hallucinations and verbally agrees to approach staff if thoughts of self harm arises. Patient admits to depression and anxiety. Patient is out more. Patient is medication compliant. Patient does not attend unit programming. Reassurance and support provided. Q15 minute checks maintained. Patient remains safe at this time.      
  Problem: Depression/Self Harm  Goal: Effect of psychiatric condition will be minimized and patient will be protected from self harm  Description: INTERVENTIONS:  1. Assess impact of patient's symptoms on level of functioning, self care needs and offer support as indicated  2. Assess patient/family knowledge of depression, impact on illness and need for teaching  3. Provide emotional support, presence and reassurance  4. Assess for possible suicidal thoughts or ideation. If patient expresses suicidal thoughts or statements do not leave alone, initiate Suicide Precautions, move to a room close to the nursing station and obtain sitter  5. Initiate consults as appropriate with Mental Health Professional, Spiritual Care, Psychosocial CNS, and consider a recommendation to the [facility] for a Psychiatric Consultation  Outcome: Progressing  Note: Patient denies wanting to hurt self of others at this time.  Patient educated to seek help from staff if needing anything.     
  Problem: Psychosis  Goal: Will report no hallucinations or delusions  Description: INTERVENTIONS:  1. Administer medication as  ordered  2. Assist with reality testing to support increasing orientation  3. Assess if patient's hallucinations or delusions are encouraging self harm or harm to others and intervene as appropriate  1/21/2024 0219 by Gabriel Ponce RN  Outcome: Progressing     Problem: Anxiety  Goal: Will report anxiety at manageable levels  Description: INTERVENTIONS:  1. Administer medication as ordered  2. Teach and rehearse alternative coping skills  3. Provide emotional support with 1:1 interaction with staff  1/21/2024 0219 by Gabriel Ponce RN  Outcome: Progressing     Problem: Depression/Self Harm  Goal: Effect of psychiatric condition will be minimized and patient will be protected from self harm  Description: INTERVENTIONS:  1. Assess impact of patient's symptoms on level of functioning, self care needs and offer support as indicated  2. Assess patient/family knowledge of depression, impact on illness and need for teaching  3. Provide emotional support, presence and reassurance  4. Assess for possible suicidal thoughts or ideation. If patient expresses suicidal thoughts or statements do not leave alone, initiate Suicide Precautions, move to a room close to the nursing station and obtain sitter  5. Initiate consults as appropriate with Mental Health Professional, Spiritual Care, Psychosocial CNS, and consider a recommendation to the [facility] for a Psychiatric Consultation  1/21/2024 0219 by Gabriel Ponce RN  Outcome: Progressing  Note: Patient denies thoughts to harm self and others. Patient was guarded and affect was flat during assessment. Patient continues to be irritable that she is hospitalized and believes people were actually trying to break into her home. Patient was isolative to rrom this evening but came out for medications. Patient encouraged to attend groups and socialize with peers. 
  Problem: Psychosis  Goal: Will report no hallucinations or delusions  Description: INTERVENTIONS:  1. Administer medication as  ordered  2. Assist with reality testing to support increasing orientation  3. Assess if patient's hallucinations or delusions are encouraging self harm or harm to others and intervene as appropriate  1/21/2024 1314 by Sosa Vargas LPN  Outcome: Progressing  Note: Patient denies any audio or visual hallucinations at this time. Patient agrees to notify staff if hallucinations or delusions occur. Staff ensures safety by providing safety checks on the unit intermittently and every 15 minutes. Staff continues to provide a safe environment.      Problem: Anxiety  Goal: Will report anxiety at manageable levels  Description: INTERVENTIONS:  1. Administer medication as ordered  2. Teach and rehearse alternative coping skills  3. Provide emotional support with 1:1 interaction with staff  1/21/2024 1314 by Sosa Vargas LPN  Outcome: Progressing  Flowsheets (Taken 1/21/2024 1314)  Will report anxiety at manageable levels:   Administer medication as ordered   Provide emotional support with 1:1 interaction with staff   Teach and rehearse alternative coping skills  Note: Patient denies depression at this time though she remains isolative to self. Patient agrees to seek out nursing staff if need be. Patient educated on alternative measures of coping and relation. Staff ensures safety by providing safety checks on the unit intermittently and every 15 minutes. Staff continues to provide a safe environment.      Problem: Depression/Self Harm  Goal: Effect of psychiatric condition will be minimized and patient will be protected from self harm  Description: INTERVENTIONS:  1. Assess impact of patient's symptoms on level of functioning, self care needs and offer support as indicated  2. Assess patient/family knowledge of depression, impact on illness and need for teaching  3. Provide emotional support, 
  Problem: Psychosis  Goal: Will report no hallucinations or delusions  Description: INTERVENTIONS:  1. Administer medication as  ordered  2. Assist with reality testing to support increasing orientation  3. Assess if patient's hallucinations or delusions are encouraging self harm or harm to others and intervene as appropriate  Outcome: Progressing   Miss Bledsoe is seen in the dayroom, she reports an \"okay\" mood. When asked about her home and issues that brought her here, she states Im not sure, things are still there, I don't know. Stated her   almost a year ago, and she has no support. Stressed about current medical conditions. Took medications and did attend a groups, Patient wants to hold off on long acting till the morning and talk with   Problem: Anxiety  Goal: Will report anxiety at manageable levels  Description: INTERVENTIONS:  1. Administer medication as ordered  2. Teach and rehearse alternative coping skills  3. Provide emotional support with 1:1 interaction with staff  Outcome: Progressing   Reports some anxiety, at manageable  levels   Problem: Depression/Self Harm  Goal: Effect of psychiatric condition will be minimized and patient will be protected from self harm  Description: INTERVENTIONS:  1. Assess impact of patient's symptoms on level of functioning, self care needs and offer support as indicated  2. Assess patient/family knowledge of depression, impact on illness and need for teaching  3. Provide emotional support, presence and reassurance  4. Assess for possible suicidal thoughts or ideation. If patient expresses suicidal thoughts or statements do not leave alone, initiate Suicide Precautions, move to a room close to the nursing station and obtain sitter  5. Initiate consults as appropriate with Mental Health Professional, Spiritual Care, Psychosocial CNS, and consider a recommendation to the [facility] for a Psychiatric Consultation  Outcome: Progressing   Reports some depression, 
  Problem: Psychosis  Goal: Will report no hallucinations or delusions  Description: INTERVENTIONS:  1. Administer medication as  ordered  2. Assist with reality testing to support increasing orientation  3. Assess if patient's hallucinations or delusions are encouraging self harm or harm to others and intervene as appropriate  Outcome: Progressing  Note: Patient denies any audio or visual hallucinations at this time. Patient has moments of paranoia. Patient agrees to notify staff if hallucinations or delusions occur. Staff ensures safety by providing safety checks on the unit intermittently and every 15 minutes. Staff continues to provide a safe environment.      Problem: Psychosis  Goal: Will report no hallucinations or delusions  Description: INTERVENTIONS:  1. Administer medication as  ordered  2. Assist with reality testing to support increasing orientation  3. Assess if patient's hallucinations or delusions are encouraging self harm or harm to others and intervene as appropriate  Outcome: Progressing  Note: Patient denies any audio or visual hallucinations at this time. Patient has moments of paranoia. Patient agrees to notify staff if hallucinations or delusions occur. Staff ensures safety by providing safety checks on the unit intermittently and every 15 minutes. Staff continues to provide a safe environment.      Problem: Anxiety  Goal: Will report anxiety at manageable levels  Description: INTERVENTIONS:  1. Administer medication as ordered  2. Teach and rehearse alternative coping skills  3. Provide emotional support with 1:1 interaction with staff  1/23/2024 1634 by Sosa Vargas LPN  Outcome: Not Progressing  Flowsheets (Taken 1/23/2024 1140 by Janet Cuevas)  Will report anxiety at manageable levels:   Provide emotional support with 1:1 interaction with staff   Teach and rehearse alternative coping skills  Note: Patient remains free from harm to self or others but admits to continued anxiety 
  Problem: Risk for Elopement  Goal: Patient will not exit the unit/facility without proper excort  1/20/2024 1804 by Allyssa Gonzalez, RN  Outcome: Completed     Problem: Psychosis  Goal: Will report no hallucinations or delusions  Description: INTERVENTIONS:  1. Administer medication as  ordered  2. Assist with reality testing to support increasing orientation  3. Assess if patient's hallucinations or delusions are encouraging self harm or harm to others and intervene as appropriate  1/20/2024 1809 by Allyssa Gonzalez, RN  Outcome: Progressing     Note: Patient presents this shift with flat/blunt affect, maintains fair eye contact. Patient interacts with staff when approached. Patient denies suicidal/homicidal/self harm ideations. Patient denies hallucinations. Patient denies experiencing any kind of delusions and continues to believe that someone was breaking in to her home and walking on her property. Patient denies feeling paranoid. Patient remains in behavioral control and is compliant with medications. Patient has refused meals today stating \" I'm not hungry. \" Support, encouragement, and reassurance provided this shift. Safety maintained with every 15 minute checks and as needed.   Problem: Anxiety  Goal: Will report anxiety at manageable levels  Description: INTERVENTIONS:  1. Administer medication as ordered  2. Teach and rehearse alternative coping skills  3. Provide emotional support with 1:1 interaction with staff  1/20/2024 1809 by Allyssa Gonzalez, RN  Outcome: Progressing     Problem: Depression/Self Harm  Goal: Effect of psychiatric condition will be minimized and patient will be protected from self harm  Description: INTERVENTIONS:  1. Assess impact of patient's symptoms on level of functioning, self care needs and offer support as indicated  2. Assess patient/family knowledge of depression, impact on illness and need for teaching  3. Provide emotional support, presence and reassurance  4. Assess 
functioning, self care needs and offer support as indicated   Assess patient/family knowledge of depression, impact on illness and need for teaching   Provide emotional support, presence and reassurance   Assess for suicidal thoughts or ideation. If patient expresses suicidal thoughts or statements do not leave alone, initiate Suicide Precautions, move near nurse station, obtain sitter   Initiate consults as appropriate with Mental Health Professional, Spiritual Care, Psychosocial CNS, and consider a recommendation to the [facility] for a Psychiatric Consultation  Note: Patient remains free from harm to self or others but admits to continued anxiety and depression. Medications available upon request. Staff ensures safety by providing safety checks on the unit intermittently and every 15 minutes. Staff continues to provide a safe environment. Staff encouraged patient to notify if depression continues.   1/21/2024 1314 by Sosa Vargas LPN  Outcome: Not Progressing  Flowsheets (Taken 1/21/2024 1313)  Effect of psychiatric condition will be minimized and patient will be protected from self harm:   Assess impact of patient’s symptoms on level of functioning, self care needs and offer support as indicated   Assess patient/family knowledge of depression, impact on illness and need for teaching   Provide emotional support, presence and reassurance   Assess for suicidal thoughts or ideation. If patient expresses suicidal thoughts or statements do not leave alone, initiate Suicide Precautions, move near nurse station, obtain sitter   Initiate consults as appropriate with Mental Health Professional, Spiritual Care, Psychosocial CNS, and consider a recommendation to the [facility] for a Psychiatric Consultation  Note: Patient denies depression at this time though she remains isolative to self. Patient agrees to seek out nursing staff if need be. Patient educated on alternative measures of coping and relation. Staff ensures safety by 
replacement procedure of right knee 1/10/2017    History of pneumonia     3-4 years ago (Written 10/14/2019)    History of suburethral sling procedure 12/18/2017    Hx of blood clots 2017    When patient had a PIC line left chest. No longer on blood thinners per pt.    Hypothyroidism     Iron deficiency anemia 01/27/2017    No longer per pt.    Knee pain 4/13/2017    LBBB (left bundle branch block) 05/2019    Lumbosacral radiculitis 4/13/2017    Menopause present 12/10/2018    Menorrhagia 1/27/2017    Migraine headache     Mixed anxiety depressive disorder 1/27/2017    Moderate persistent asthma 1/27/2017    Morbid obesity (HCC) 1/27/2017    Morbid obesity with BMI of 40.0-44.9, adult (HCC) 12/4/2017    Narcolepsy     Occurred several years (Written 10/14/2019)    Nonalcoholic fatty liver disease 1/27/2017    Obesity (BMI 30-39.9) 1/10/2017    Pain due to knee joint prosthesis (HCC) 4/13/2017    Pain due to total right knee replacement (HCC) 4/13/2017    Pain in pelvis 9/20/2017    PONV (postoperative nausea and vomiting) 3/27/2018    Primary osteoarthritis of right knee 7/19/2017    Prolonged emergence from general anesthesia 3/27/2018    Recurrent urinary tract infection 8/17/2017    Restless leg 8/24/2017    Right knee pain 1/10/2017    Sleep apnea     Sleep apnea with use of continuous positive airway pressure (CPAP)     Status post arthroscopy of shoulder 1/2/2018    Stress incontinence 01/27/2017    In past no longer    Superior glenoid labrum lesion of shoulder 1/27/2017    Thrombocytopenic disorder (HCC) 4/13/2017    Urinary urgency 02/20/2015    Only with UTI per pt.    UTI due to extended-spectrum beta lactamase (ESBL) producing Escherichia coli 12/18/2017     Status EXAM:Mental Status and Behavioral Exam  Normal: No  Level of Assistance: Independent/Self  Facial Expression: Worried, Exaggerated  Affect: Unstable  Level of Consciousness: Alert  Frequency of Checks: 4 times per hour, close  Mood:Normal:

## 2024-01-24 NOTE — GROUP NOTE
Group Therapy Note    Date: 1/24/2024    Group Start Time: 1045  Group End Time: 1130  Group Topic: Cognitive Skills    [facility]        Group Therapy Note    Attendees: 11/20       Patient's Goal:  pt will demonstrate improved coping skills and improved interpersonal relationships    Notes:  pt is pleasant and participates well     Status After Intervention:  Improved    Participation Level: Active Listener and Interactive    Participation Quality: Appropriate and Sharing      Speech:  normal      Thought Process/Content: Logical      Affective Functioning: Congruent      Mood: euthymic      Level of consciousness:  Alert      Response to Learning: Able to verbalize current knowledge/experience, Capable of insight, and Progressing to goal      Endings: None Reported    Modes of Intervention: Education, Support, and Activity      Discipline Responsible: Psychoeducational Specialist      Signature:  ERLINDA LUNA

## 2024-01-24 NOTE — PROGRESS NOTES
Behavioral Services  Medicare Certification Upon Admission    I certify that this patient's inpatient psychiatric hospital admission is medically necessary for:    [x] (1) Treatment which could reasonably be expected to improve this patient's condition,       [x] (2) Or for diagnostic study;     AND     [x](2) The inpatient psychiatric services are provided while the individual is under the care of a physician and are included in the individualized plan of care.    Estimated length of stay/service 2-9 days    Plan for post-hospital care -outpatient care    Electronically signed by EMELINA WELCH MD on 1/20/2024 at 11:39 AM      
  Daily Progress Note  1/21/2024    Patient Name: Soni Bledsoe    CHIEF COMPLAINT:  Acute psychosis          SUBJECTIVE:      Patient seen face-to-face for follow-up assessment.  She is lying in bed but is awake and alert.  Patient remains paranoid with delusional thoughts.  Expresses belief that her home is not safe and that people are there stealing things from her, and trying to harm her.  She does not feel safe to return to her home and states that she would likely go back to a hotel.  She however notes that she does not feel that she would be safe anywhere.  Patient has been compliant with her scheduled psychotropic medications.  Noted that at time of admission, patient medication regimen likely contributing to psychotic state.  These medications (bupropion, Requip and venlafaxine) have been held.  She was started on risperidone 1 mg twice daily.  Reviewed for any potential side effects and patient denies all.  She displays poor insight into her situation.  Not able to contract for safety in the community at this time.  Staff note that she has been withdrawn and isolative.  Not interactive in the milieu or attending group programming.    She has yet to demonstrate stability and requires inpatient hospitalization for safety.    Appetite:  [x] Adequate/Unchanged  [] Increased  [] Decreased      Sleep:       [] Adequate/Unchanged  [x] Fair  [] Poor      Group Attendance on Unit:   [] Yes   [] Selectively    [x] No    Compliant with scheduled medications: [x] Yes  [] No    Received emergency medications in past 24 hrs: [] Yes   [x] No    Medication Side Effects: Denies         Mental Status Exam  Level of consciousness: Alert and awake   Appearance: Appropriate attire for setting, resting in bed, with fair  grooming and hygiene   Behavior/Motor: Approachable, engages with interviewer, no psychomotor abnormalities   Attitude toward examiner: Cooperative, attentive, good eye contact  Speech: Mumbled, 
BRONCHOSPASM/BRONCHOCONSTRICTION     [x]         IMPROVE AERATION/BREATH SOUNDS  [x]   ADMINISTER BRONCHODILATOR THERAPY AS APPROPRIATE  [x]   ASSESS BREATH SOUNDS  []   IMPLEMENT AEROSOL/MDI PROTOCOL  [x]   PATIENT EDUCATION AS NEEDED    
BRONCHOSPASM/BRONCHOCONSTRICTION     [x]         IMPROVE AERATION/BREATH SOUNDS  [x]   ADMINISTER BRONCHODILATOR THERAPY AS APPROPRIATE  [x]   ASSESS BREATH SOUNDS  []   IMPLEMENT AEROSOL/MDI PROTOCOL  [x]   PATIENT EDUCATION AS NEEDED    
CLINICAL PHARMACY NOTE: MEDS TO BEDS    Total # of Prescriptions Filled: 3   The following medications were delivered to the patient:  Levothyroxine 125mcg  Prednisone 20mg  Sucralafate 1GM     Additional Documentation: delivered to [facility] by Beatrice BLACK 1/24/24 12:37pm kbg -Augmentin and Lamictal too soon to fill on file at [facility]  
IN-PATIENT SERVICE  [facility]    CONSULTATION / HISTORY AND PHYSICAL EXAMINATION            Date:   1/21/2024  Patient name:  Soni Bledsoe  Date of admission:  1/19/2024  8:42 PM  MRN:   [id number]  Account:  [id number]  YOB: 1965  PCP:    Valerie Rubio DO  Room:   [address] [place] [address]  Code Status:    Full Code    Physician Requesting Consult: Conner Ruiz MD    Reason for Consult:  medical management    Chief Complaint:     Chief Complaint   Patient presents with    Mental Health Problem       History Obtained From:     Patient medical record nursing staff    History of Present Illness:   Patient is past medical history multiple medical problem which include morbid obesity, hypothyroidism, depression, history of pacemaker, COPD, C VID, LEROY not on sleep machine, thrombocytopenia, follows with oncologist  Patient,, admitted to [facility] floor with acute psychosis  Never diagnosed with chronic medical condition of diabetes, hypertension, coronary artery disease  Patient, does not waking eye contact, not very good historian  Depressed    Past Medical History:     Past Medical History:   Diagnosis Date    Acid reflux     Adenomatous polyp of stomach 10/01/2019    Anemia 4/13/2017    Anxiety 4/13/2017    Anxiety disorder     Arthritis     Arthritis of right acromioclavicular joint 5/20/2019    Atopic rhinitis 1/27/2017    Benign paroxysmal positional vertigo 01/27/2017    3-4 years ago (Written 10/14/2019)    Bronchitis     With sinus infections    Carpal tunnel syndrome 1/27/2017    Chondromalacia 1/27/2017    Chondromalacia of right patellofemoral joint 2/21/2017    Chronic midline low back pain without sciatica 3/14/2017    Chronic pain disorder 4/13/2017    Chronic sinusitis 01/27/2017    In past no longer per pt.    Common variable agammaglobulinemia (HCC) 9/20/2017    CVID (common variable immunodeficiency) (HCC)     Pt follows-up with Dr. Harris (Written 10/14/2019).  
Pt refusing to wear BiPAP at this time. BiPAP not taken to unit unless needed. Nebulizer setup with tank in med room to start tomorrow AM.  
RT ASSESSMENT TREATMENT GOALS    [x]Pt Goal:  Pt will identify 1-2 positive coping skills by time of discharge.    []Pt Goal:  Pt will identify 1-2 positive aspects of self by time of discharge.    []Pt Goal:  Pt will remain on task/topic for 15-30 minutes during group by time of discharge.    []Pt Goal:  Pt will identify 1-2 aspects of relapse prevention plan by time of discharge.    []Pt Goal:  Pt will join in conversation with peers 1-2 times per group by time of discharge.    []Pt Goal:  Pt will identify 1-2 new leisure interests by time of discharge.    [x]Pt Goal:  Pt will not voice any delusional content by time of discharge.   
Daily  propranolol (INDERAL LA) extended release capsule 60 mg, 60 mg, Oral, Daily  pantoprazole (PROTONIX) tablet 40 mg, 40 mg, Oral, QAM AC  ondansetron (ZOFRAN) tablet 4 mg, 4 mg, Oral, Q8H PRN  levothyroxine (SYNTHROID) tablet 125 mcg, 125 mcg, Oral, Daily  lamoTRIgine (LAMICTAL) tablet 200 mg, 200 mg, Oral, Nightly  albuterol sulfate HFA (PROVENTIL;VENTOLIN;PROAIR) 108 (90 Base) MCG/ACT inhaler 2 puff, 2 puff, Inhalation, Q6H PRN  sucralfate (CARAFATE) tablet 1 g, 1 g, Oral, TID RT  nitroGLYCERIN (NITROSTAT) SL tablet 0.4 mg, 0.4 mg, SubLINGual, Q5 Min PRN  vitamin B-12 (CYANOCOBALAMIN) tablet 1,000 mcg, 1,000 mcg, Oral, Daily  ciclopirox (PENLAC) 8 % solution, , Topical, Nightly  acetaminophen (TYLENOL) tablet 650 mg, 650 mg, Oral, Q4H PRN  polyethylene glycol (GLYCOLAX) packet 17 g, 17 g, Oral, Daily PRN  hydrOXYzine HCl (ATARAX) tablet 50 mg, 50 mg, Oral, TID PRN  traZODone (DESYREL) tablet 50 mg, 50 mg, Oral, Nightly PRN  aluminum & magnesium hydroxide-simethicone (MAALOX) 200-200-20 MG/5ML suspension 30 mL, 30 mL, Oral, Q6H PRN  haloperidol (HALDOL) tablet 5 mg, 5 mg, Oral, Q6H PRN  haloperidol lactate (HALDOL) injection 5 mg, 5 mg, IntraMUSCular, Q6H PRN **AND** diphenhydrAMINE (BENADRYL) injection 50 mg, 50 mg, IntraMUSCular, Q6H PRN  amoxicillin-clavulanate (AUGMENTIN) 875-125 MG per tablet 1 tablet, 1 tablet, Oral, 2 times per day  ipratropium 0.5 mg-albuterol 2.5 mg (DUONEB) nebulizer solution 1 Dose, 1 Dose, Inhalation, 4x Daily RT    ASSESSMENT  Acute psychosis (HCC)         PATIENT HANDOFF  Patient symptoms are: Starting to show some modest improvements  Beginning to develop some insight  Monitor need and frequency of PRN medications.  Encourage participation in groups and milieu.  Medication changes and discharge planning per attending  Follow-up daily while inpatient.     Patient continues to be monitored in the inpatient psychiatric facility at [facility] for safety and stabilization. Patient continues 
changes.    PLAN  Medications as noted above  Attempt to develop insight  Psycho-education conducted.  Supportive Therapy conducted.  Follow-up daily while on inpatient unit    Electronically signed by EMELINA WELCH MD on 1/22/24 at 7:55 PM EST        
antibiotic    1/22  Remains on room air, shortness of breath improving  Improving with steroids  No wheeze heard on exam  Blood pressure log reviewed-controlled  Patient reporting loose stool-2 episodes in last 24 hours due to antibiotics.  Ordering probiotic.    Thank you for the consult.  Medicine will sign off. Please do not hesitate to contact us for any issues or concerns.       Radha Nolasco MD  1/22/2024  4:11 PM    Copy sent to Valerie Cabrera S, DO    Please note that this chart was generated using voice recognition Dragon dictation software.  Although every effort was made to ensure the accuracy of this automated transcription, some errors in transcription may have occurred.  
7

## 2024-01-24 NOTE — DISCHARGE INSTRUCTIONS
Information:  Medications:   Medication summary provided   I understand that I should take only the medications on my list.     -why and when I need to take each medicine.     -which side effects to watch for.     -that I should carry my medication information at all times in case of     Emergency situations.    I will take all of my medicines to follow up appointments.     -check with my physician or pharmacist before taking any new    Medication, over the counter product or drink alcohol.    -Ask about food, drug or dietary supplement interactions.    -discard old lists and update records with medication providers.    Notify Physician:  Notify physician if you notice:   Always call 911 if you feel your life is in danger  In case of an emergency call 911 immediately!  If 911 is not available call your local emergency medical system for help    Behavioral Health Follow Up:  Original Referral Source:[facility]  Discharge Diagnosis: Acute psychosis (HCC) [F23]  Recommendations for Level of Care: follow up appointment @ [facility]  Patient status at discharge: stable  My hospital  was: [facility]  Aftercare plan faxed: [place]   -faxed by: nursing   -date: 1/24/24   -time: 1500  Prescriptions: filled and sent home with patient per Meds to Beds     Smoking: Quit Smoking.   Call the [employer]'s smoking quitline at 6-134-40M-QUIT  Know the signs of a heart attack   If you have any of the following symptoms call 911 immediately, do not wait more    Than five minutes.    1. Pressure, fullness and/ or squeezing in the center of the chest spreading to    The jaw, neck or shoulder.    2. Chest discomfort with light headedness, fainting, sweating, nausea or    Shortness of breath.   3. Upper abdominal pressure or discomfort.   4. Lower chest pain, back pain, unusual fatigue, shortness of breath, nausea   Or dizziness.     General Information:   Questions regarding your bill: Call HELP program [phone]     Suicide

## 2024-01-24 NOTE — GROUP NOTE
Group Therapy Note    Date: 1/24/2024    Group Start Time: 0915  Group End Time: 0945  Group Topic: Community Meeting    Zakia eHnnessy        Group Therapy Note    Attendees: 7/20     Community Meeting Group Note        Date: January 24, 2024 Start Time:  0915   End Time:  0945      Number of Participants in Group & Unit Census:  7/20    Topic:  goal setting    Goal of Group: Set short term goal for the day      Comments:     Patient did not participate in Community Meeting group, despite staff encouragement and explanation of benefits.  Patient remain seclusive to self.  Q15 minute safety checks maintained for patient safety and will continue to encourage patient to attend unit programming.

## 2024-01-24 NOTE — GROUP NOTE
Group Therapy Note    Date: 1/24/2024    Group Start Time: 1330  Group End Time: 1415  Group Topic: Psychoeducation    [facility]    Psych-Ed/Relapse Prevention Group Note        Date: January 24, 2024 Start Time: 1:30pm  End Time:  215pm      Number of Participants in Group & Unit Census:  9/19    Topic: psycheducation group     Goal of Group: pt will demonstrate improved communication skills and improved interpersonal relationships      Comments:     Patient did not participate in Psych-Ed/Relapse Prevention group, despite staff encouragement and explanation of benefits.  Patient remain seclusive to self.  Q15 minute safety checks maintained for patient safety and will continue to encourage patient to attend unit programming.              Signature:  ERLINDA LUNA

## 2024-01-25 NOTE — CARE COORDINATION
BHI Biopsychosocial Assessment    Current Level of Psychosocial Functioning     Independent xx  Dependent    Minimal Assist     Comments:    Psychosocial High Risk Factors (check all that apply)    Unable to obtain meds   Chronic illness/pain  xx  Substance abuse   Lack of Family Support xx  Financial stress   Isolation   Inadequate Community Resources xx  Suicide attempt(s)  Not taking medications   Victim of crime   Developmental Delay  Unable to manage personal needs    Age 65 or older   Homeless  No transportation   Readmission within 30 days  Unemployment  Traumatic Event xx    Comments:   Psychiatric Advanced Directives: none reported     Family to Involve in Treatment: pt denies having family support     Sexual Orientation:  n/a    Patient Strengths: pt has stable housing, though she has been living in a hotel, receives social security income     Patient Barriers: pt   7 months ago, denies being linked with [facility] (chart states she confirmed link)      Opiate Education Provided:  pt denies       CMHC/mental health history: pt has history of outpatient TMS treatments at [facility] 2022; pt is currently linked with [facility]     Plan of Care   medication management, group/individual therapies, family meetings, psycho -education, treatment team meetings to assist with stabilization    Initial Discharge Plan:        Clinical Summary:  Soni is a 58 year old single female who has been admitted to [facility] after being brought to hospital for mental health evaluation by police. Pt chart reports pt has contacted local police department \"numerous times\" in the last few weeks due to pt suspicion of people on her property; also recently pt calling police as she believed there to be a \"bomb\" on her property and when officer responded pt was witnessed with a firearm in her hand. Pt has been most recently staying at a motel due to these suspicions. SW met with pt this date, pt observed to be anxious and 
tablet  Notes to patient: supplement     nitroGLYCERIN 0.4 MG SL tablet  Commonly known as: NITROSTAT  Notes to patient: As needed to treat heart pain     ondansetron 4 MG tablet  Commonly known as: ZOFRAN  Notes to patient: For nausea     pantoprazole 40 MG tablet  Commonly known as: PROTONIX  Notes to patient: For heartburn/acid reflux     propranolol 60 MG extended release capsule  Commonly known as: INDERAL LA  Notes to patient: For the heart     sucralfate 1 GM tablet  Commonly known as: CARAFATE  Take 1 tablet by mouth three times daily  Notes to patient: For stomach health     Sunosi 150 MG Tabs  Generic drug: Solriamfetol HCl  Notes to patient: Used to treat daytime lethargy             STOP taking these medications      albuterol sulfate  (90 Base) MCG/ACT inhaler  Commonly known as: PROVENTIL;VENTOLIN;PROAIR     ARIPiprazole 5 MG tablet  Commonly known as: ABILIFY     azelastine 0.1 % nasal spray  Commonly known as: ASTELIN     buPROPion 150 MG extended release tablet  Commonly known as: WELLBUTRIN XL     butalbital-acetaminophen-caffeine -40 MG per tablet  Commonly known as: FIORICET, ESGIC     fluticasone 50 MCG/ACT nasal spray  Commonly known as: FLONASE     gabapentin 300 MG capsule  Commonly known as: NEURONTIN     Hizentra 10 GM/50ML Soln subcutaneous solution  Generic drug: immune globulin 20%     LORazepam 0.5 MG tablet  Commonly known as: ATIVAN     Lutein-Zeaxanthin 25-5 MG Caps     methylPREDNISolone 4 MG tablet  Commonly known as: MEDROL DOSEPACK     ondansetron 4 MG disintegrating tablet  Commonly known as: ZOFRAN-ODT     oxyCODONE-acetaminophen 5-325 MG per tablet  Commonly known as: PERCOCET     pimecrolimus 1 % cream  Commonly known as: ELIDEL     rOPINIRole 0.5 MG tablet  Commonly known as: REQUIP     Spravato (56 MG Dose) 28 MG/DEVICE nasal solution  Generic drug: esketamine     Trintellix 20 MG Tabs tablet  Generic drug: VORTIoxetine HBr     venlafaxine 37.5 MG

## 2024-03-04 ENCOUNTER — OFFICE VISIT (OUTPATIENT)
Dept: PODIATRY | Age: 59
End: 2024-03-04
Payer: COMMERCIAL

## 2024-03-04 VITALS — HEIGHT: 65 IN | BODY MASS INDEX: 36.99 KG/M2 | WEIGHT: 222 LBS

## 2024-03-04 DIAGNOSIS — M79.672 LEFT FOOT PAIN: ICD-10-CM

## 2024-03-04 DIAGNOSIS — L60.0 OC (ONYCHOCRYPTOSIS): ICD-10-CM

## 2024-03-04 DIAGNOSIS — S93.401A MODERATE RIGHT ANKLE SPRAIN, INITIAL ENCOUNTER: ICD-10-CM

## 2024-03-04 DIAGNOSIS — M79.671 RIGHT FOOT PAIN: Primary | ICD-10-CM

## 2024-03-04 PROCEDURE — 99214 OFFICE O/P EST MOD 30 MIN: CPT | Performed by: PODIATRIST

## 2024-03-04 RX ORDER — MECLIZINE HYDROCHLORIDE 25 MG/1
25 TABLET ORAL 3 TIMES DAILY PRN
COMMUNITY
Start: 2024-01-31

## 2024-03-04 RX ORDER — RIZATRIPTAN BENZOATE 10 MG/1
TABLET, ORALLY DISINTEGRATING ORAL
COMMUNITY
Start: 2024-01-31

## 2024-03-04 RX ORDER — PROCHLORPERAZINE MALEATE 10 MG
10 TABLET ORAL EVERY 8 HOURS PRN
COMMUNITY
Start: 2024-01-31

## 2024-03-04 RX ORDER — GABAPENTIN 300 MG/1
CAPSULE ORAL
COMMUNITY

## 2024-03-04 RX ORDER — ONDANSETRON 4 MG/1
TABLET, ORALLY DISINTEGRATING ORAL
COMMUNITY
Start: 2024-02-01

## 2024-03-04 RX ORDER — ALPRAZOLAM 0.5 MG/1
0.5 TABLET, EXTENDED RELEASE ORAL DAILY PRN
COMMUNITY

## 2024-03-04 NOTE — PROGRESS NOTES
contraction for 15 seconds.  Relax for 10 seconds.  Inversion Isometrics     This isometric exercise focuses on inversion:   While seated, place the inside of the injured foot against a table leg or closed door.  Push inward with your foot into the object your foot is against (your ankle joint should not move) causing a contraction of your muscles.  Hold this muscle contraction for 15 seconds.  Relax for 10 seconds.  Resisted Strengthening Dorsiflexion     Resisted strengthening exercises should be performed with a Theraband providing resistance to your movements.   These exercises will also work to strengthen the muscles around your ankle. This will provide added support to the joint. Perform each exercise 10 to 15 times in a row.   Never tie a Theraband (or anything else) around your foot, ankle, or leg in a way that would restrict blood flow.  Ankle dorsiflexion with resistance helps to strengthen your anterior tibialis muscle. Here is how you do it:   Moving only your ankle, point your foot back toward your nose (while keeping knees straight). Continue until you feel discomfort or can't tilt it back any further.  Hold this position for 2 seconds and slowly release.  Return to the neutral position, and then repeat the exercise.  Resisted Strengthening Plantar Flexion     Resisted ankle plantar flexion helps to strengthen your calf muscles and Achilles tendon.   To do the exercise:   Moving only your ankle, point your foot forward (while keeping knees straight). You may feel tightness in your calf muscle behind your lower leg. Continue until you feel discomfort or can't move it any further.  Hold this position for 2 seconds.  Return to neutral position.  Resisted Strengthening Inversion     This exercise will provide strengthening as well:   Moving only your ankle and keeping your toes pointed up, turn your foot inward, so the sole is facing your other leg. Continue until either discomfort is felt or you can no

## 2025-07-17 ENCOUNTER — TRANSCRIBE ORDERS (OUTPATIENT)
Dept: ADMINISTRATIVE | Age: 60
End: 2025-07-17

## 2025-07-17 DIAGNOSIS — M47.816 FACET ARTHROPATHY, LUMBAR: ICD-10-CM

## 2025-07-17 DIAGNOSIS — M53.9 MULTILEVEL DEGENERATIVE DISC DISEASE: Primary | ICD-10-CM

## (undated) DEVICE — PAD,ABDOMINAL,5"X9",ST,LF,25/BX: Brand: MEDLINE INDUSTRIES, INC.

## (undated) DEVICE — GLOVE SURG SZ 65 L12IN FNGR THK87MIL WHT LTX FREE

## (undated) DEVICE — GLOVE SURG SZ 8 L12IN FNGR THK87MIL WHT LTX FREE

## (undated) DEVICE — TUBING, SUCTION, 1/4" X 12', STRAIGHT: Brand: MEDLINE

## (undated) DEVICE — Device

## (undated) DEVICE — GLOVE,EXAM,NITRILE,RESTORE,OAT SENSE,L: Brand: MEDLINE

## (undated) DEVICE — TOURNIQUET CUF BLD PRESSURE 4X18 IN 2 PRT SINGLE BLDR RED

## (undated) DEVICE — PADDING UNDERCAST W4INXL4YD COT FBR LO LINTING WYTEX

## (undated) DEVICE — STANDARD HYPODERMIC NEEDLE,POLYPROPYLENE HUB: Brand: MONOJECT

## (undated) DEVICE — TAPE ADH W1INXL10YD PLAS TRNSPAR H2O RESIST HYPOALRG CURAD

## (undated) DEVICE — CAP PROTCT GRN REFIL FOR 0.054-0.062IN WIRE W SER PIN BALL

## (undated) DEVICE — DRAPE C ARM UNIV W41XL74IN CLR PLAS XR VELC CLSR POLY STRP

## (undated) DEVICE — THIN OFFSET (9.0 X 0.38 X 25.0MM)

## (undated) DEVICE — SKIN PREP TRAY W/CHG: Brand: MEDLINE INDUSTRIES, INC.

## (undated) DEVICE — GLOVE SURG SZ 7 L12IN FNGR THK87MIL WHT LTX FREE

## (undated) DEVICE — CHLORAPREP 26ML ORANGE

## (undated) DEVICE — GLOVE SURG SZ 75 L12IN FNGR THK87MIL WHT LTX FREE

## (undated) DEVICE — YANKAUER,FLEXIBLE HANDLE,REGLR CAPACITY: Brand: MEDLINE INDUSTRIES, INC.

## (undated) DEVICE — GOWN,SIRUS,NON REINFRCD,LARGE,SET IN SL: Brand: MEDLINE

## (undated) DEVICE — GLOVE EXAM SM L95IN FNGR THK35MIL PALM THK24MIL OFF WHT